# Patient Record
Sex: MALE | Race: ASIAN | NOT HISPANIC OR LATINO | Employment: OTHER | URBAN - METROPOLITAN AREA
[De-identification: names, ages, dates, MRNs, and addresses within clinical notes are randomized per-mention and may not be internally consistent; named-entity substitution may affect disease eponyms.]

---

## 2017-01-30 DIAGNOSIS — Z79.4 TYPE 2 DIABETES MELLITUS WITHOUT COMPLICATION, WITH LONG-TERM CURRENT USE OF INSULIN (HCC): ICD-10-CM

## 2017-01-30 DIAGNOSIS — I10 ESSENTIAL HYPERTENSION: ICD-10-CM

## 2017-01-30 DIAGNOSIS — E78.00 PURE HYPERCHOLESTEROLEMIA: ICD-10-CM

## 2017-01-30 DIAGNOSIS — E11.9 TYPE 2 DIABETES MELLITUS WITHOUT COMPLICATION, WITH LONG-TERM CURRENT USE OF INSULIN (HCC): ICD-10-CM

## 2017-01-30 RX ORDER — SIMVASTATIN 40 MG
40 TABLET ORAL EVERY EVENING
Qty: 30 TAB | Refills: 0 | Status: SHIPPED | OUTPATIENT
Start: 2017-01-30 | End: 2017-03-16 | Stop reason: SDUPTHER

## 2017-01-30 RX ORDER — LISINOPRIL 40 MG/1
40 TABLET ORAL DAILY
Qty: 30 TAB | Refills: 0 | Status: SHIPPED | OUTPATIENT
Start: 2017-01-30 | End: 2017-03-16 | Stop reason: SDUPTHER

## 2017-01-30 NOTE — TELEPHONE ENCOUNTER
----- Message from Tanmay Elizabeth sent at 1/27/2017  4:42 PM PST -----  Regarding: CHRISTOP[facility] PT- REFILL REQ  Contact: [phone]  Patient requesting refills on the following meds.....    1. One touch ultra blue test strips  2. Lisinipril  3. Lantus solostar inj  4. Simvastatin  Patient uses the [employer] on [address].

## 2017-01-30 NOTE — TELEPHONE ENCOUNTER
Last seen: 10/27/16 by Dr. Jason Torres appt: None     Was the patient seen in the last year in this department? Yes   Does patient have an active prescription for medications requested? No   Received Request Via: Pharmacy

## 2017-03-16 ENCOUNTER — OFFICE VISIT (OUTPATIENT)
Dept: INTERNAL MEDICINE | Facility: MEDICAL CENTER | Age: 62
End: 2017-03-16
Payer: COMMERCIAL

## 2017-03-16 VITALS
WEIGHT: 163.6 LBS | HEIGHT: 64 IN | BODY MASS INDEX: 27.93 KG/M2 | TEMPERATURE: 97.8 F | DIASTOLIC BLOOD PRESSURE: 82 MMHG | SYSTOLIC BLOOD PRESSURE: 140 MMHG | OXYGEN SATURATION: 95 % | HEART RATE: 96 BPM

## 2017-03-16 DIAGNOSIS — Z79.4 TYPE 2 DIABETES MELLITUS WITHOUT COMPLICATION, WITH LONG-TERM CURRENT USE OF INSULIN (HCC): ICD-10-CM

## 2017-03-16 DIAGNOSIS — E78.00 PURE HYPERCHOLESTEROLEMIA: ICD-10-CM

## 2017-03-16 DIAGNOSIS — K21.9 GASTROESOPHAGEAL REFLUX DISEASE, ESOPHAGITIS PRESENCE NOT SPECIFIED: ICD-10-CM

## 2017-03-16 DIAGNOSIS — I10 ESSENTIAL HYPERTENSION: ICD-10-CM

## 2017-03-16 DIAGNOSIS — E55.9 VITAMIN D DEFICIENCY: ICD-10-CM

## 2017-03-16 DIAGNOSIS — E11.9 TYPE 2 DIABETES MELLITUS WITHOUT COMPLICATION, WITH LONG-TERM CURRENT USE OF INSULIN (HCC): ICD-10-CM

## 2017-03-16 DIAGNOSIS — K21.9 GASTROESOPHAGEAL REFLUX DISEASE WITHOUT ESOPHAGITIS: ICD-10-CM

## 2017-03-16 DIAGNOSIS — B19.10 HEPATITIS B VIRUS INFECTION, UNSPECIFIED CHRONICITY: ICD-10-CM

## 2017-03-16 PROCEDURE — 99214 OFFICE O/P EST MOD 30 MIN: CPT | Mod: GC | Performed by: INTERNAL MEDICINE

## 2017-03-16 RX ORDER — ASPIRIN 81 MG/1
81 TABLET, CHEWABLE ORAL DAILY
Qty: 100 TAB | Refills: 3 | Status: SHIPPED | OUTPATIENT
Start: 2017-03-16 | End: 2022-04-02

## 2017-03-16 RX ORDER — OMEPRAZOLE 20 MG/1
20 CAPSULE, DELAYED RELEASE ORAL DAILY
Qty: 30 CAP | Refills: 3 | Status: SHIPPED | OUTPATIENT
Start: 2017-03-16 | End: 2022-04-02

## 2017-03-16 RX ORDER — NAPROXEN 250 MG/1
250 TABLET ORAL 2 TIMES DAILY WITH MEALS
Qty: 60 TAB | Refills: 3 | Status: SHIPPED | OUTPATIENT
Start: 2017-03-16 | End: 2022-02-17

## 2017-03-16 RX ORDER — LISINOPRIL 40 MG/1
40 TABLET ORAL DAILY
Qty: 30 TAB | Refills: 0 | Status: SHIPPED | OUTPATIENT
Start: 2017-03-16 | End: 2022-02-17

## 2017-03-16 RX ORDER — BLOOD-GLUCOSE METER
1 EACH MISCELLANEOUS 2 TIMES DAILY
Qty: 100 KIT | Refills: 0 | Status: SHIPPED | OUTPATIENT
Start: 2017-03-16 | End: 2017-06-29 | Stop reason: SDUPTHER

## 2017-03-16 RX ORDER — SIMVASTATIN 40 MG
40 TABLET ORAL EVERY EVENING
Qty: 30 TAB | Refills: 0 | Status: SHIPPED | OUTPATIENT
Start: 2017-03-16 | End: 2022-02-17 | Stop reason: SDUPTHER

## 2017-03-16 ASSESSMENT — PATIENT HEALTH QUESTIONNAIRE - PHQ9: CLINICAL INTERPRETATION OF PHQ2 SCORE: 1

## 2017-03-16 NOTE — ASSESSMENT & PLAN NOTE
"Filed Vitals:    03/16/17 1430   BP: 140/82   Pulse: 96   Temp: 36.6 °C (97.8 °F)   Height: 1.626 m (5' 4\")   Weight: 74.208 kg (163 lb 9.6 oz)   SpO2: 95%     BP better today though could continue to improve on lisinopril 40 mg which he can continue  Counsled on therapeutic lifestyle modifications including diet, exercise and weightloss  "

## 2017-03-16 NOTE — ASSESSMENT & PLAN NOTE
On lantus 30 u QHS and humalog 12u prior to lunch  Blood glucose log is somewhat better today can continue current regimen, will recheck HbA1c  Monofilament test with stable deficits  Patient has not seen ophthalmologist, will provide referral  ACE-I for hypertension as above  No flu vax this year, has declined despite counseling

## 2017-03-16 NOTE — PROGRESS NOTES
Established Patient    Pac presents today with the following:    CC: Follow up    HPI: Patient is a 61-year-old male with a past medical history significant for hepatitis B, GERD, vitamin D deficiency, hyperlipidemia, essential hypertension, diabetes type 2 who presents to the clinic for follow-up. He believe that patient has been compliant with his medications and otherwise has no complaints today. The patient has been eating well and has been somewhat active. The patient has been logging his blood sugars and feels they're under control. The patient otherwise denies any chest pain, shortness of breath, nausea, vomiting, dizziness, headache, loss of consciousness, fever, chills, dysuria, diarrhea, constipation, abdominal pain, cough, sputum or bleeding.    Patient Active Problem List    Diagnosis Date Noted   • Hepatitis B 06/23/2016   • GERD (gastroesophageal reflux disease) 06/23/2016   • Vitamin D deficiency 06/23/2016   • Hyperlipidemia 06/23/2016   • DM type 2 (diabetes mellitus, type 2) (CMS-[facility]) 06/23/2016   • Essential hypertension 06/23/2016     Current Outpatient Prescriptions   Medication Sig Dispense Refill   • vitamin D (CHOLECALCIFEROL) 1000 UNIT Tab Take 2 Tabs by mouth every day. 30 Tab 3   • lisinopril (PRINIVIL, ZESTRIL) 40 MG tablet Take 1 Tab by mouth every day. 30 Tab 0   • Insulin Pen Needle 31G X 8 MM Misc 1 Units by Does not apply route 2 Times a Day. 60 Each 6   • insulin lispro, Human, (HUMALOG) 100 UNIT/ML Solution Pen-injector injection Inject 15 Units as instructed every day before lunch. 3 PEN 9   • insulin glargine (LANTUS) 100 UNIT/ML Solution Pen-injector injection Inject 30 Units as instructed every bedtime. 1 PEN 0   • glucose blood strip 1 Strip by Other route 3 times a day. 90 Strip 0   • glucose blood strip 1 Strip by Other route as needed. 100 Strip 3   • Blood Glucose Monitoring Suppl (ONE TOUCH ULTRA 2) W/DEVICE Kit 1 Kit by Does not apply route 2 Times a Day. 100 Kit 0  "  • simvastatin (ZOCOR) 40 MG Tab Take 1 Tab by mouth every evening. 30 Tab 0   • naproxen (NAPROSYN) 250 MG Tab Take 1 Tab by mouth 2 times a day, with meals. 60 Tab 3   • omeprazole (PRILOSEC) 20 MG delayed-release capsule Take 1 Cap by mouth every day. 30 Cap 3   • metformin (GLUCOPHAGE) 1000 MG tablet Take 1 Tab by mouth 2 times a day, with meals. 60 Tab 3   • aspirin (ASA) 81 MG Chew Tab chewable tablet Take 1 Tab by mouth every day. 100 Tab 3     No current facility-administered medications for this visit.     ROS: As per HPI. Additional pertinent symptoms as noted below.    /82 mmHg  Pulse 96  Temp(Src) 36.6 °C (97.8 °F)  Ht 1.626 m (5' 4\")  Wt 74.208 kg (163 lb 9.6 oz)  BMI 28.07 kg/m2  SpO2 95%    Physical Exam   Constitutional:  oriented to person, place, and time. No distress.   Eyes: Pupils are equal, round, and reactive to light. No scleral icterus.  Neck: Neck supple. No thyromegaly present.   Cardiovascular: Normal rate, regular rhythm and normal heart sounds.  Exam reveals no gallop and no friction rub.  No murmur heard.  Pulmonary/Chest: Breath sounds normal. Chest wall is not dull to percussion.   Musculoskeletal:   no edema.   Lymphadenopathy: no cervical adenopathy  Neurological: alert and oriented to person, place, and time, distal bilateral sensory deficits on monofilament exam   Skin: No cyanosis. Nails show no clubbing. Bilateral feed with cracks and dry skin and some onychomycosis    Assessment and Plan    Problem List Items Addressed This Visit     Hepatitis B    Relevant Medications    naproxen (NAPROSYN) 250 MG Tab    GERD (gastroesophageal reflux disease)     Symptoms controlled with omeprazole 20 mg QD         Relevant Medications    omeprazole (PRILOSEC) 20 MG delayed-release capsule    Vitamin D deficiency     Maintained on 2000u/day will recheck levels and titrate as necessary         Relevant Medications    vitamin D (CHOLECALCIFEROL) 1000 UNIT Tab    Hyperlipidemia     " "Maintained on simvastatin 40 mg can continue, will recheck lipid panel         Relevant Medications    lisinopril (PRINIVIL, ZESTRIL) 40 MG tablet    simvastatin (ZOCOR) 40 MG Tab    DM type 2 (diabetes mellitus, type 2) (CMS-[facility])     On lantus 30 u QHS and humalog 12u prior to lunch  Blood glucose log is somewhat better today can continue current regimen, will recheck HbA1c  Monofilament test with stable deficits  Patient has not seen ophthalmologist, will provide referral  ACE-I for hypertension as above  No flu vax this year, has declined despite counseling         Relevant Medications    lisinopril (PRINIVIL, ZESTRIL) 40 MG tablet    Insulin Pen Needle 31G X 8 MM Misc    insulin lispro, Human, (HUMALOG) 100 UNIT/ML Solution Pen-injector injection    insulin glargine (LANTUS) 100 UNIT/ML Solution Pen-injector injection    glucose blood strip    glucose blood strip    Blood Glucose Monitoring Suppl (ONE TOUCH ULTRA 2) W/DEVICE Kit    metformin (GLUCOPHAGE) 1000 MG tablet    Other Relevant Orders    REFERRAL TO PODIATRY    REFERRAL TO OPHTHALMOLOGY    Essential hypertension     Filed Vitals:    03/16/17 1430   BP: 140/82   Pulse: 96   Temp: 36.6 °C (97.8 °F)   Height: 1.626 m (5' 4\")   Weight: 74.208 kg (163 lb 9.6 oz)   SpO2: 95%     BP better today though could continue to improve on lisinopril 40 mg which he can continue  Counsled on therapeutic lifestyle modifications including diet, exercise and weightloss         Relevant Medications    lisinopril (PRINIVIL, ZESTRIL) 40 MG tablet    simvastatin (ZOCOR) 40 MG Tab    metformin (GLUCOPHAGE) 1000 MG tablet    aspirin (ASA) 81 MG Chew Tab chewable tablet        Followup: Return in about 3 months (around 6/16/2017).    Signed by: Juan Luis Gomez M.D.    "

## 2017-03-16 NOTE — MR AVS SNAPSHOT
"        Pac Keith   3/16/2017 2:15 PM   Office Visit   MRN: [id number]    Department:  [facility] - Internal Med   Dept Phone:  [phone]    Description:  Male : 1955   Provider:  Juan Luis Gomez M.D.           Reason for Visit     Diabetes Foot exam due, c/o feet hurting.     Medication Refill ALL     Knee Pain Naprosyn doesn't help      Allergies as of 3/16/2017     No Known Allergies      You were diagnosed with     Vitamin D deficiency   [1581504]       Type 2 diabetes mellitus without complication, with long-term current use of insulin (CMS-[facility])   [9507943]       Essential hypertension   [5913246]       Pure hypercholesterolemia   [272.0.ICD-9-CM]       Gastroesophageal reflux disease, esophagitis presence not specified   [2352024]       Hepatitis B virus infection, unspecified chronicity   [2616434]       Gastroesophageal reflux disease without esophagitis   [732503]         Vital Signs     Blood Pressure Pulse Temperature Height Weight Body Mass Index    140/82 mmHg 96 36.6 °C (97.8 °F) 1.626 m (5' 4\") 74.208 kg (163 lb 9.6 oz) 28.07 kg/m2    Oxygen Saturation Smoking Status                95% Former Smoker          Basic Information     Date Of Birth Sex Race Ethnicity Preferred Language    1955 Male  Non- English      Your appointments     2017  2:00 PM   Established Patient with Juan Luis Gomez M.D.   [facility] [place] [facility] - Internal Medicine (--)    [address] [id number] [phone]           You will be receiving a confirmation call a few days before your appointment from our automated call confirmation system.              Problem List              ICD-10-CM Priority Class Noted - Resolved    Hepatitis B B19.10   2016 - Present    GERD (gastroesophageal reflux disease) K21.9   2016 - Present    Vitamin D deficiency E55.9   2016 - Present    Hyperlipidemia E78.5   2016 - Present    DM type 2 (diabetes mellitus, type 2) " (CMS-[facility]) E11.9   6/23/2016 - Present    Essential hypertension I10   6/23/2016 - Present      Health Maintenance        Date Due Completion Dates    RETINAL SCREENING 12/20/1971 ---    IMM DTaP/Tdap/Td Vaccine (1 - Tdap) 12/20/1972 ---    IMM PNEUMOCOCCAL 19-64 (ADULT) MEDIUM RISK SERIES (1 of 1 - PPSV23) 12/20/1972 ---    URINE ACR / MICROALBUMIN 12/29/2006 12/29/2005    IMM ZOSTER VACCINE 12/20/2013 ---    A1C SCREENING 4/13/2017 10/13/2016, 12/29/2005    FASTING LIPID PROFILE 10/13/2017 10/13/2016, 12/29/2005    SERUM CREATININE 10/13/2017 10/13/2016, 12/29/2005    DIABETES MONOFILAMENT / LE EXAM 3/16/2018 3/16/2017 (N/S)    Override on 3/16/2017: (N/S)    COLONOSCOPY 3/14/2027 3/14/2017 (N/S)    Override on 3/14/2017: (N/S) (PER G[facility] AND C NO COLONOSCOPY)            Current Immunizations     Influenza Vaccine Quad Inj (Preserved) 10/27/2016      Below and/or attached are the medications your provider expects you to take. Review all of your home medications and newly ordered medications with your provider and/or pharmacist. Follow medication instructions as directed by your provider and/or pharmacist. Please keep your medication list with you and share with your provider. Update the information when medications are discontinued, doses are changed, or new medications (including over-the-counter products) are added; and carry medication information at all times in the event of emergency situations     Allergies:  No Known Allergies          Medications  Valid as of: March 16, 2017 -  3:24 PM    Generic Name Brand Name Tablet Size Instructions for use    Aspirin (Chew Tab) ASA 81 MG Take 1 Tab by mouth every day.        Blood Glucose Monitoring Suppl (Kit) ONE TOUCH ULTRA 2 W/DEVICE 1 Kit by Does not apply route 2 Times a Day.        Cholecalciferol (Tab) cholecalciferol 1000 UNIT Take 2 Tabs by mouth every day.        Glucose Blood (Strip) glucose blood  1 Strip by Other route 3 times a day.        Glucose Blood  (Strip) glucose blood  1 Strip by Other route as needed.        Insulin Glargine (Solution Pen-injector) LANTUS 100 UNIT/ML Inject 30 Units as instructed every bedtime.        Insulin Lispro (Solution Pen-injector) HUMALOG 100 UNIT/ML Inject 15 Units as instructed every day before lunch.        Insulin Pen Needle (Misc) Insulin Pen Needle 31G X 8 MM 1 Units by Does not apply route 2 Times a Day.        Lisinopril (Tab) PRINIVIL, ZESTRIL 40 MG Take 1 Tab by mouth every day.        MetFORMIN HCl (Tab) GLUCOPHAGE 1000 MG Take 1 Tab by mouth 2 times a day, with meals.        Naproxen (Tab) NAPROSYN 250 MG Take 1 Tab by mouth 2 times a day, with meals.        Omeprazole (CAPSULE DELAYED RELEASE) PRILOSEC 20 MG Take 1 Cap by mouth every day.        Simvastatin (Tab) ZOCOR 40 MG Take 1 Tab by mouth every evening.        .                 Medicines prescribed today were sent to:     [facility] [address] [place] [address] [address] [place], [place] [id number] [address] [place] [id number]    Phone: [phone] Fax: [phone]    Open 24 Hours?: No      Medication refill instructions:       If your prescription bottle indicates you have medication refills left, it is not necessary to call your provider’s office. Please contact your pharmacy and they will refill your medication.    If your prescription bottle indicates you do not have any refills left, you may request refills at any time through one of the following ways: The online Incentive system (except Urgent Care), by calling your provider’s office, or by asking your pharmacy to contact your provider’s office with a refill request. Medication refills are processed only during regular business hours and may not be available until the next business day. Your provider may request additional information or to have a follow-up visit with you prior to refilling your medication.   *Please Note: Medication refills are assigned a new Rx number when refilled electronically. Your pharmacy may indicate that  no refills were authorized even though a new prescription for the same medication is available at the pharmacy. Please request the medicine by name with the pharmacy before contacting your provider for a refill.        Referral     A referral request has been sent to our patient care coordination department. Please allow 3-5 business days for us to process this request and contact you either by phone or mail. If you do not hear from us by the 5th business day, please call us at [phone].           MyChart Status: Patient Declined

## 2017-04-06 NOTE — TELEPHONE ENCOUNTER
Last seen: 03/16/17 by Dr. Gomez  Next appt: 06/29/17 with Dr. Gomez    Was the patient seen in the last year in this department? Yes   Does patient have an active prescription for medications requested? No   Received Request Via: Pharmacy

## 2017-04-07 RX ORDER — INSULIN GLARGINE 100 [IU]/ML
INJECTION, SOLUTION SUBCUTANEOUS
Qty: 15 PEN | Refills: 0 | Status: SHIPPED | OUTPATIENT
Start: 2017-04-07 | End: 2022-02-17 | Stop reason: SDUPTHER

## 2017-06-09 NOTE — TELEPHONE ENCOUNTER
Was the patient seen in the last year in this department? Yes Last seen 3/16/17 Dr Gomez next appt 6/29/17 Dr Gomez    Does patient have an active prescription for medications requested? No     Received Request Via: Pharmacy

## 2017-06-12 RX ORDER — SIMVASTATIN 40 MG
TABLET ORAL
Qty: 30 TAB | Refills: 0 | Status: SHIPPED | OUTPATIENT
Start: 2017-06-12 | End: 2022-02-17

## 2017-06-29 ENCOUNTER — OFFICE VISIT (OUTPATIENT)
Dept: INTERNAL MEDICINE | Facility: MEDICAL CENTER | Age: 62
End: 2017-06-29
Payer: COMMERCIAL

## 2017-06-29 VITALS
DIASTOLIC BLOOD PRESSURE: 90 MMHG | HEART RATE: 88 BPM | HEIGHT: 64 IN | OXYGEN SATURATION: 95 % | SYSTOLIC BLOOD PRESSURE: 150 MMHG | TEMPERATURE: 97.7 F | WEIGHT: 164.2 LBS | BODY MASS INDEX: 28.03 KG/M2

## 2017-06-29 DIAGNOSIS — E11.9 TYPE 2 DIABETES MELLITUS WITHOUT COMPLICATION, WITH LONG-TERM CURRENT USE OF INSULIN (HCC): ICD-10-CM

## 2017-06-29 DIAGNOSIS — I10 ESSENTIAL HYPERTENSION: ICD-10-CM

## 2017-06-29 DIAGNOSIS — Z79.4 TYPE 2 DIABETES MELLITUS WITHOUT COMPLICATION, WITH LONG-TERM CURRENT USE OF INSULIN (HCC): ICD-10-CM

## 2017-06-29 DIAGNOSIS — L72.9 CUTANEOUS CYST: ICD-10-CM

## 2017-06-29 DIAGNOSIS — E78.00 PURE HYPERCHOLESTEROLEMIA: ICD-10-CM

## 2017-06-29 LAB
HBA1C MFR BLD: 7.5 % (ref ?–5.8)
INT CON NEG: NEGATIVE
INT CON POS: POSITIVE

## 2017-06-29 PROCEDURE — 99213 OFFICE O/P EST LOW 20 MIN: CPT | Mod: GE | Performed by: INTERNAL MEDICINE

## 2017-06-29 PROCEDURE — 83036 HEMOGLOBIN GLYCOSYLATED A1C: CPT | Mod: GC | Performed by: INTERNAL MEDICINE

## 2017-06-29 RX ORDER — HYDROCHLOROTHIAZIDE 12.5 MG/1
12.5 TABLET ORAL DAILY
Qty: 30 TAB | Refills: 3 | Status: SHIPPED | OUTPATIENT
Start: 2017-06-29 | End: 2022-02-17

## 2017-06-29 RX ORDER — BLOOD-GLUCOSE METER
1 EACH MISCELLANEOUS 2 TIMES DAILY
Qty: 100 KIT | Refills: 0 | Status: SHIPPED | OUTPATIENT
Start: 2017-06-29 | End: 2022-04-02

## 2017-06-29 NOTE — ASSESSMENT & PLAN NOTE
"Filed Vitals:    06/29/17 1402   BP: 150/90   Pulse: 88   Temp: 36.5 °C (97.7 °F)   Height: 1.626 m (5' 4\")   Weight: 74.481 kg (164 lb 3.2 oz)   SpO2: 95%   Elevated today, on lisinopril 40 mg QD  Will add hydrochlorothiazide 12.5 mg QD to this  Continues to be hypertensive today will increase  "

## 2017-06-29 NOTE — PROGRESS NOTES
Established Patient    Pac presents today with the following:    CC: Follow-up    HPI: Patient is a 61-year-old male with a past medical history significant for hepatitis B, vitamin D deficiency, hyperlipidemia, hypertension, type 2 diabetes who presents to the clinic for follow-up. The patient has not had his lab work performed as instructed. The patient otherwise has no complaints today. Patient otherwise denies any chest pain, shortness of breath, nausea, vomiting, dizziness, abdominal pain, fever, chills, headache, dysuria, diarrhea, constipation or any bleeding.    Patient Active Problem List    Diagnosis Date Noted   • Cutaneous cyst 06/29/2017   • Hepatitis B 06/23/2016   • GERD (gastroesophageal reflux disease) 06/23/2016   • Vitamin D deficiency 06/23/2016   • Hyperlipidemia 06/23/2016   • DM type 2 (diabetes mellitus, type 2) (CMS-[facility]) 06/23/2016   • Essential hypertension 06/23/2016       Current Outpatient Prescriptions   Medication Sig Dispense Refill   • glucose blood strip 1 Strip by Other route 3 times a day. 90 Strip 3   • Insulin Pen Needle 31G X 8 MM Misc 1 Units by Does not apply route 2 Times a Day. 60 Each 6   • Blood Glucose Monitoring Suppl (ONE TOUCH ULTRA 2) W/DEVICE Kit 1 Kit by Does not apply route 2 Times a Day. 100 Kit 0   • hydrochlorothiazide (HYDRODIURIL) 12.5 MG tablet Take 1 Tab by mouth every day. 30 Tab 3   • LANTUS SOLOSTAR 100 UNIT/ML Solution Pen-injector injection INJECT 30 UNITS SUBCUTANEOUSLY EVERY NIGHT AT BEDTIME 15 PEN 0   • lisinopril (PRINIVIL, ZESTRIL) 40 MG tablet Take 1 Tab by mouth every day. 30 Tab 0   • insulin lispro, Human, (HUMALOG) 100 UNIT/ML Solution Pen-injector injection Inject 15 Units as instructed every day before lunch. 3 PEN 9   • glucose blood strip 1 Strip by Other route as needed. 100 Strip 3   • simvastatin (ZOCOR) 40 MG Tab Take 1 Tab by mouth every evening. 30 Tab 0   • naproxen (NAPROSYN) 250 MG Tab Take 1 Tab by mouth 2 times a day,  "with meals. 60 Tab 3   • omeprazole (PRILOSEC) 20 MG delayed-release capsule Take 1 Cap by mouth every day. 30 Cap 3   • metformin (GLUCOPHAGE) 1000 MG tablet Take 1 Tab by mouth 2 times a day, with meals. 60 Tab 3   • simvastatin (ZOCOR) 40 MG Tab TAKE ONE TABLET BY MOUTH IN THE EVENING 30 Tab 0   • vitamin D (CHOLECALCIFEROL) 1000 UNIT Tab Take 2 Tabs by mouth every day. 30 Tab 3   • insulin glargine (LANTUS) 100 UNIT/ML Solution Pen-injector injection Inject 30 Units as instructed every bedtime. 1 PEN 0   • aspirin (ASA) 81 MG Chew Tab chewable tablet Take 1 Tab by mouth every day. 100 Tab 3     No current facility-administered medications for this visit.       ROS: As per HPI. Additional pertinent symptoms as noted below.    /90 mmHg  Pulse 88  Temp(Src) 36.5 °C (97.7 °F)  Ht 1.626 m (5' 4\")  Wt 74.481 kg (164 lb 3.2 oz)  BMI 28.17 kg/m2  SpO2 95%    Physical Exam   Constitutional:  oriented to person, place, and time. No distress.   Eyes: Pupils are equal, round, and reactive to light. No scleral icterus.  Neck: Neck supple. No thyromegaly present.   Cardiovascular: Normal rate, regular rhythm and normal heart sounds.  Exam reveals no gallop and no friction rub.  No murmur heard.  Pulmonary/Chest: Breath sounds normal. Chest wall is not dull to percussion.   Musculoskeletal:   no edema.   Lymphadenopathy: no cervical adenopathy  Neurological: alert and oriented to person, place, and time.   Skin: No cyanosis. Nails show no clubbing. ~1in firm nodule on shoulder, nontender, no erythema or drainage    Assessment and Plan    Problem List Items Addressed This Visit     Hyperlipidemia     Maintained on simvastatin 40 mg can continue  Patient needs to have lipid panel performed         Relevant Medications    hydrochlorothiazide (HYDRODIURIL) 12.5 MG tablet    DM type 2 (diabetes mellitus, type 2) (CMS-[facility])     On lantus 30u QHS and humalog 15u before lunch  Has not had labwork performed, has not brought " "his glucose log today  Needs new glucometer strips  We'll recheck HbA1c today (Hba1c 7.5 today) good control, continue current regiment  Has not seen ophthalmology, will provide referral  Is on ACE inhibitor for hypertension  Continues to decline flu vaccination         Relevant Medications    glucose blood strip    Insulin Pen Needle 31G X 8 MM Misc    Blood Glucose Monitoring Suppl (ONE TOUCH ULTRA 2) W/DEVICE Kit    Other Relevant Orders    POCT A1C    MICROALBUMIN CREAT RATIO URINE (LAB COLLECT)    REFERRAL TO OPHTHALMOLOGY    Essential hypertension     Filed Vitals:    06/29/17 1402   BP: 150/90   Pulse: 88   Temp: 36.5 °C (97.7 °F)   Height: 1.626 m (5' 4\")   Weight: 74.481 kg (164 lb 3.2 oz)   SpO2: 95%   Elevated today, on lisinopril 40 mg QD  Will add hydrochlorothiazide 12.5 mg QD to this  Continues to be hypertensive today will increase         Relevant Medications    hydrochlorothiazide (HYDRODIURIL) 12.5 MG tablet    Cutaneous cyst     Likely benign, will have labwork performed with INR and plan for drainage on next visit         Relevant Orders    PROTHROMBIN TIME          Followup: Return in about 1 month (around 7/29/2017).      Signed by: Juan Luis Gomez M.D.    "

## 2017-06-29 NOTE — MR AVS SNAPSHOT
"        Pac Keith   2017 2:00 PM   Office Visit   MRN: [id number]    Department:  [facility]   Dept Phone:  [phone]    Description:  Male : 1955   Provider:  Juan Luis Gomez M.D.           Reason for Visit     Diabetes Needs new glucometer, test strips, lancets.     Mass Right shoulder       Allergies as of 2017     No Known Allergies      You were diagnosed with     Type 2 diabetes mellitus without complication, with long-term current use of insulin (CMS-HCC)   [9388596]       Essential hypertension   [1049981]       Pure hypercholesterolemia   [272.0.ICD-9-CM]       Cutaneous cyst   [592370]         Vital Signs     Blood Pressure Pulse Temperature Height Weight Body Mass Index    150/90 mmHg 88 36.5 °C (97.7 °F) 1.626 m (5' 4\") 74.481 kg (164 lb 3.2 oz) 28.17 kg/m2    Oxygen Saturation Smoking Status                95% Former Smoker          Basic Information     Date Of Birth Sex Race Ethnicity Preferred Language    1955 Male  Non- English      Problem List              ICD-10-CM Priority Class Noted - Resolved    Hepatitis B B19.10   2016 - Present    GERD (gastroesophageal reflux disease) K21.9   2016 - Present    Vitamin D deficiency E55.9   2016 - Present    Hyperlipidemia E78.5   2016 - Present    DM type 2 (diabetes mellitus, type 2) (CMS-HCC) E11.9   2016 - Present    Essential hypertension I10   2016 - Present    Cutaneous cyst L72.9   2017 - Present      Health Maintenance        Date Due Completion Dates    RETINAL SCREENING 1971 ---    IMM DTaP/Tdap/Td Vaccine (1 - Tdap) 1972 ---    IMM PNEUMOCOCCAL 19-64 (ADULT) MEDIUM RISK SERIES (1 of 1 - PPSV23) 1972 ---    URINE ACR / MICROALBUMIN 2006    IMM ZOSTER VACCINE 2013 ---    A1C SCREENING 2017 10/13/2016, 2005    FASTING LIPID PROFILE 10/13/2017 10/13/2016, 2005    SERUM CREATININE 10/13/2017 10/13/2016, 2005 "    DIABETES MONOFILAMENT / LE EXAM 3/16/2018 3/16/2017 (N/S)    Override on 3/16/2017: (N/S)    COLONOSCOPY 3/14/2027 3/14/2017 (N/S)    Override on 3/14/2017: (N/S) (PER [facility] AND C NO COLONOSCOPY)            Current Immunizations     Influenza Vaccine Quad Inj (Preserved) 10/27/2016      Below and/or attached are the medications your provider expects you to take. Review all of your home medications and newly ordered medications with your provider and/or pharmacist. Follow medication instructions as directed by your provider and/or pharmacist. Please keep your medication list with you and share with your provider. Update the information when medications are discontinued, doses are changed, or new medications (including over-the-counter products) are added; and carry medication information at all times in the event of emergency situations     Allergies:  No Known Allergies          Medications  Valid as of: June 29, 2017 -  2:49 PM    Generic Name Brand Name Tablet Size Instructions for use    Aspirin (Chew Tab) ASA 81 MG Take 1 Tab by mouth every day.        Blood Glucose Monitoring Suppl (Kit) ONE TOUCH ULTRA 2 W/DEVICE 1 Kit by Does not apply route 2 Times a Day.        Cholecalciferol (Tab) cholecalciferol 1000 UNIT Take 2 Tabs by mouth every day.        Glucose Blood (Strip) glucose blood  1 Strip by Other route as needed.        Glucose Blood (Strip) glucose blood  1 Strip by Other route 3 times a day.        HydroCHLOROthiazide (Tab) HYDRODIURIL 12.5 MG Take 1 Tab by mouth every day.        Insulin Glargine (Solution Pen-injector) LANTUS 100 UNIT/ML Inject 30 Units as instructed every bedtime.        Insulin Glargine (Solution Pen-injector) LANTUS SOLOSTAR 100 UNIT/ML INJECT 30 UNITS SUBCUTANEOUSLY EVERY NIGHT AT BEDTIME        Insulin Lispro (Solution Pen-injector) HUMALOG 100 UNIT/ML Inject 15 Units as instructed every day before lunch.        Insulin Pen Needle (Misc) Insulin Pen Needle 31G X 8 MM 1 Units  by Does not apply route 2 Times a Day.        Lisinopril (Tab) PRINIVIL, ZESTRIL 40 MG Take 1 Tab by mouth every day.        MetFORMIN HCl (Tab) GLUCOPHAGE 1000 MG Take 1 Tab by mouth 2 times a day, with meals.        Naproxen (Tab) NAPROSYN 250 MG Take 1 Tab by mouth 2 times a day, with meals.        Omeprazole (CAPSULE DELAYED RELEASE) PRILOSEC 20 MG Take 1 Cap by mouth every day.        Simvastatin (Tab) ZOCOR 40 MG Take 1 Tab by mouth every evening.        Simvastatin (Tab) ZOCOR 40 MG TAKE ONE TABLET BY MOUTH IN THE EVENING        .                 Medicines prescribed today were sent to:     [facility] [address] [place] [address], [place] - [address]    [address] [place] [address]    Phone: [phone] Fax: [phone]    Open 24 Hours?: No      Medication refill instructions:       If your prescription bottle indicates you have medication refills left, it is not necessary to call your provider’s office. Please contact your pharmacy and they will refill your medication.    If your prescription bottle indicates you do not have any refills left, you may request refills at any time through one of the following ways: The online [employer] system (except Urgent Care), by calling your provider’s office, or by asking your pharmacy to contact your provider’s office with a refill request. Medication refills are processed only during regular business hours and may not be available until the next business day. Your provider may request additional information or to have a follow-up visit with you prior to refilling your medication.   *Please Note: Medication refills are assigned a new Rx number when refilled electronically. Your pharmacy may indicate that no refills were authorized even though a new prescription for the same medication is available at the pharmacy. Please request the medicine by name with the pharmacy before contacting your provider for a refill.        Your To Do List     Future Labs/Procedures Complete By Expires      MICROALBUMIN CREAT RATIO URINE (LAB COLLECT)  As directed 6/29/2018    PROTHROMBIN TIME  As directed 6/29/2018      Referral     A referral request has been sent to our patient care coordination department. Please allow 3-5 business days for us to process this request and contact you either by phone or mail. If you do not hear from us by the 5th business day, please call us at [phone].        Instructions    Have labwork performed  Return to clinic in 1 month          [employer] Access Code: [email or web address]  Expires: 7/29/2017  2:49 PM    [employer]  A secure, online tool to manage your health information     [employer]’s [employer]® is a secure, online tool that connects you to your personalized health information from the privacy of your home -- day or night - making it very easy for you to manage your healthcare. Once the activation process is completed, you can even access your medical information using the [employer] ellyn, which is available for free in the [employer] Ellyn store or Google Play store.     [employer] provides the following levels of access (as shown below):   My Chart Features   [facility]  Specialists [facility]  Urgent  Care [facility]  Primary Care  Doctor   Email your healthcare team securely and privately 24/7 X X X    Manage appointments: schedule your next appointment; view details of past/upcoming appointments X      Request prescription refills. X      View recent personal medical records, including lab and immunizations X X X X   View health record, including health history, allergies, medications X X X X   Read reports about your outpatient visits, procedures, consult and ER notes X X X X   See your discharge summary, which is a recap of your hospital and/or ER visit that includes your diagnosis, lab results, and care plan. X X       How to register for [employer]:  1. Go to  [email or web address].  2. Click on the Sign Up Now box, which takes you to the New Member Sign  Up page. You will need to provide the following information:  a. Enter your [employer] Access Code exactly as it appears at the top of this page. (You will not need to use this code after you’ve completed the sign-up process. If you do not sign up before the expiration date, you must request a new code.)   b. Enter your date of birth.   c. Enter your home email address.   d. Click Submit, and follow the next screen’s instructions.  3. Create a [employer] ID. This will be your [employer] login ID and cannot be changed, so think of one that is secure and easy to remember.  4. Create a [employer] password. You can change your password at any time.  5. Enter your Password Reset Question and Answer. This can be used at a later time if you forget your password.   6. Enter your e-mail address. This allows you to receive e-mail notifications when new information is available in [employer].  7. Click Sign Up. You can now view your health information.    For assistance activating your [employer] account, call [phone]

## 2017-06-29 NOTE — ASSESSMENT & PLAN NOTE
On lantus 30u QHS and humalog 15u before lunch  Has not had labwork performed, has not brought his glucose log today  Needs new glucometer strips  We'll recheck HbA1c today (Hba1c 7.5 today) good control, continue current regiment  Has not seen ophthalmology, will provide referral  Is on ACE inhibitor for hypertension  Continues to decline flu vaccination

## 2017-07-17 ENCOUNTER — HOSPITAL ENCOUNTER (OUTPATIENT)
Dept: LAB | Facility: MEDICAL CENTER | Age: 62
End: 2017-07-17
Attending: HOSPITALIST
Payer: COMMERCIAL

## 2017-07-17 DIAGNOSIS — E55.9 VITAMIN D DEFICIENCY: ICD-10-CM

## 2017-07-17 DIAGNOSIS — I10 ESSENTIAL HYPERTENSION: ICD-10-CM

## 2017-07-17 DIAGNOSIS — Z79.4 TYPE 2 DIABETES MELLITUS WITHOUT COMPLICATION, WITH LONG-TERM CURRENT USE OF INSULIN (HCC): ICD-10-CM

## 2017-07-17 DIAGNOSIS — E11.9 TYPE 2 DIABETES MELLITUS WITHOUT COMPLICATION, WITH LONG-TERM CURRENT USE OF INSULIN (HCC): ICD-10-CM

## 2017-07-17 DIAGNOSIS — B19.10 HEPATITIS B VIRUS INFECTION, UNSPECIFIED CHRONICITY: ICD-10-CM

## 2017-07-17 DIAGNOSIS — L72.9 CUTANEOUS CYST: ICD-10-CM

## 2017-07-17 LAB
25(OH)D3 SERPL-MCNC: 16 NG/ML (ref 30–100)
ALBUMIN SERPL BCP-MCNC: 4.4 G/DL (ref 3.2–4.9)
ALBUMIN/GLOB SERPL: 1.5 G/DL
ALP SERPL-CCNC: 51 U/L (ref 30–99)
ALT SERPL-CCNC: 16 U/L (ref 2–50)
ANION GAP SERPL CALC-SCNC: 10 MMOL/L (ref 0–11.9)
AST SERPL-CCNC: 19 U/L (ref 12–45)
BASOPHILS # BLD AUTO: 0.8 % (ref 0–1.8)
BASOPHILS # BLD: 0.08 K/UL (ref 0–0.12)
BILIRUB SERPL-MCNC: 0.5 MG/DL (ref 0.1–1.5)
BUN SERPL-MCNC: 19 MG/DL (ref 8–22)
CALCIUM SERPL-MCNC: 9.9 MG/DL (ref 8.5–10.5)
CHLORIDE SERPL-SCNC: 104 MMOL/L (ref 96–112)
CHOLEST SERPL-MCNC: 162 MG/DL (ref 100–199)
CO2 SERPL-SCNC: 26 MMOL/L (ref 20–33)
CREAT SERPL-MCNC: 0.83 MG/DL (ref 0.5–1.4)
CREAT UR-MCNC: 43.3 MG/DL
EOSINOPHIL # BLD AUTO: 0.88 K/UL (ref 0–0.51)
EOSINOPHIL NFR BLD: 8.3 % (ref 0–6.9)
ERYTHROCYTE [DISTWIDTH] IN BLOOD BY AUTOMATED COUNT: 44.1 FL (ref 35.9–50)
EST. AVERAGE GLUCOSE BLD GHB EST-MCNC: 171 MG/DL
GFR SERPL CREATININE-BSD FRML MDRD: >60 ML/MIN/1.73 M 2
GLOBULIN SER CALC-MCNC: 3 G/DL (ref 1.9–3.5)
GLUCOSE SERPL-MCNC: 50 MG/DL (ref 65–99)
HBA1C MFR BLD: 7.6 % (ref 0–5.6)
HCT VFR BLD AUTO: 43 % (ref 42–52)
HDLC SERPL-MCNC: 52 MG/DL
HGB BLD-MCNC: 14.1 G/DL (ref 14–18)
IMM GRANULOCYTES # BLD AUTO: 0.02 K/UL (ref 0–0.11)
IMM GRANULOCYTES NFR BLD AUTO: 0.2 % (ref 0–0.9)
INR PPP: 1.11 (ref 0.87–1.13)
LDLC SERPL CALC-MCNC: 97 MG/DL
LYMPHOCYTES # BLD AUTO: 3.43 K/UL (ref 1–4.8)
LYMPHOCYTES NFR BLD: 32.5 % (ref 22–41)
MCH RBC QN AUTO: 29.2 PG (ref 27–33)
MCHC RBC AUTO-ENTMCNC: 32.8 G/DL (ref 33.7–35.3)
MCV RBC AUTO: 89 FL (ref 81.4–97.8)
MICROALBUMIN UR-MCNC: 2.3 MG/DL
MICROALBUMIN/CREAT UR: 53 MG/G (ref 0–30)
MONOCYTES # BLD AUTO: 0.84 K/UL (ref 0–0.85)
MONOCYTES NFR BLD AUTO: 8 % (ref 0–13.4)
NEUTROPHILS # BLD AUTO: 5.29 K/UL (ref 1.82–7.42)
NEUTROPHILS NFR BLD: 50.2 % (ref 44–72)
NRBC # BLD AUTO: 0 K/UL
NRBC BLD AUTO-RTO: 0 /100 WBC
PLATELET # BLD AUTO: 266 K/UL (ref 164–446)
PMV BLD AUTO: 9.9 FL (ref 9–12.9)
POTASSIUM SERPL-SCNC: 4 MMOL/L (ref 3.6–5.5)
PROT SERPL-MCNC: 7.4 G/DL (ref 6–8.2)
PROTHROMBIN TIME: 14.7 SEC (ref 12–14.6)
RBC # BLD AUTO: 4.83 M/UL (ref 4.7–6.1)
SODIUM SERPL-SCNC: 140 MMOL/L (ref 135–145)
TRIGL SERPL-MCNC: 66 MG/DL (ref 0–149)
WBC # BLD AUTO: 10.5 K/UL (ref 4.8–10.8)

## 2017-07-17 PROCEDURE — 82043 UR ALBUMIN QUANTITATIVE: CPT

## 2017-07-17 PROCEDURE — 83036 HEMOGLOBIN GLYCOSYLATED A1C: CPT

## 2017-07-17 PROCEDURE — 85025 COMPLETE CBC W/AUTO DIFF WBC: CPT

## 2017-07-17 PROCEDURE — 85610 PROTHROMBIN TIME: CPT

## 2017-07-17 PROCEDURE — 80053 COMPREHEN METABOLIC PANEL: CPT

## 2017-07-17 PROCEDURE — 82306 VITAMIN D 25 HYDROXY: CPT

## 2017-07-17 PROCEDURE — 80061 LIPID PANEL: CPT

## 2017-07-17 PROCEDURE — 82570 ASSAY OF URINE CREATININE: CPT

## 2017-07-17 PROCEDURE — 36415 COLL VENOUS BLD VENIPUNCTURE: CPT

## 2017-07-25 ENCOUNTER — TELEPHONE (OUTPATIENT)
Dept: INTERNAL MEDICINE | Facility: MEDICAL CENTER | Age: 62
End: 2017-07-25

## 2017-07-25 DIAGNOSIS — E11.9 TYPE 2 DIABETES MELLITUS WITHOUT COMPLICATION, WITH LONG-TERM CURRENT USE OF INSULIN (HCC): ICD-10-CM

## 2017-07-25 DIAGNOSIS — Z79.4 TYPE 2 DIABETES MELLITUS WITHOUT COMPLICATION, WITH LONG-TERM CURRENT USE OF INSULIN (HCC): ICD-10-CM

## 2017-07-25 NOTE — TELEPHONE ENCOUNTER
Pt is not established with a new PCP yet as we are waiting to see if any of the new Dr's speak Bulgarian. Please have a test strip rx print on plain paper for me to send to a diabetes supply [facility], test tid for DM E11.9.

## 2021-03-03 DIAGNOSIS — Z23 NEED FOR VACCINATION: ICD-10-CM

## 2021-12-13 ENCOUNTER — TELEPHONE (OUTPATIENT)
Dept: SCHEDULING | Facility: IMAGING CENTER | Age: 66
End: 2021-12-13

## 2022-01-24 ENCOUNTER — TELEPHONE (OUTPATIENT)
Dept: SCHEDULING | Facility: IMAGING CENTER | Age: 67
End: 2022-01-24

## 2022-02-17 ENCOUNTER — HOSPITAL ENCOUNTER (OUTPATIENT)
Facility: MEDICAL CENTER | Age: 67
End: 2022-02-17
Attending: INTERNAL MEDICINE
Payer: MEDICARE

## 2022-02-17 ENCOUNTER — OFFICE VISIT (OUTPATIENT)
Dept: MEDICAL GROUP | Facility: PHYSICIAN GROUP | Age: 67
End: 2022-02-17
Payer: MEDICARE

## 2022-02-17 VITALS
TEMPERATURE: 98.1 F | DIASTOLIC BLOOD PRESSURE: 60 MMHG | BODY MASS INDEX: 29.06 KG/M2 | RESPIRATION RATE: 18 BRPM | SYSTOLIC BLOOD PRESSURE: 110 MMHG | HEIGHT: 63 IN | OXYGEN SATURATION: 95 % | HEART RATE: 97 BPM | WEIGHT: 164 LBS

## 2022-02-17 DIAGNOSIS — E11.42 DIABETIC POLYNEUROPATHY ASSOCIATED WITH TYPE 2 DIABETES MELLITUS (HCC): ICD-10-CM

## 2022-02-17 DIAGNOSIS — Z12.5 SCREENING FOR MALIGNANT NEOPLASM OF PROSTATE: ICD-10-CM

## 2022-02-17 DIAGNOSIS — B19.10 HEPATITIS B INFECTION WITHOUT DELTA AGENT WITHOUT HEPATIC COMA, UNSPECIFIED CHRONICITY: ICD-10-CM

## 2022-02-17 DIAGNOSIS — E78.5 DYSLIPIDEMIA: ICD-10-CM

## 2022-02-17 DIAGNOSIS — E55.9 VITAMIN D DEFICIENCY: ICD-10-CM

## 2022-02-17 DIAGNOSIS — R05.9 COUGH: ICD-10-CM

## 2022-02-17 DIAGNOSIS — I10 ESSENTIAL HYPERTENSION: ICD-10-CM

## 2022-02-17 LAB
EXTERNAL QUALITY CONTROL: NORMAL
HBA1C MFR BLD: 8.2 % (ref 0–5.6)
INT CON NEG: ABNORMAL
INT CON POS: ABNORMAL
SARS-COV+SARS-COV-2 AG RESP QL IA.RAPID: NEGATIVE

## 2022-02-17 PROCEDURE — 87426 SARSCOV CORONAVIRUS AG IA: CPT | Performed by: INTERNAL MEDICINE

## 2022-02-17 PROCEDURE — U0005 INFEC AGEN DETEC AMPLI PROBE: HCPCS

## 2022-02-17 PROCEDURE — U0003 INFECTIOUS AGENT DETECTION BY NUCLEIC ACID (DNA OR RNA); SEVERE ACUTE RESPIRATORY SYNDROME CORONAVIRUS 2 (SARS-COV-2) (CORONAVIRUS DISEASE [COVID-19]), AMPLIFIED PROBE TECHNIQUE, MAKING USE OF HIGH THROUGHPUT TECHNOLOGIES AS DESCRIBED BY CMS-2020-01-R: HCPCS

## 2022-02-17 PROCEDURE — 83036 HEMOGLOBIN GLYCOSYLATED A1C: CPT | Performed by: INTERNAL MEDICINE

## 2022-02-17 PROCEDURE — 99204 OFFICE O/P NEW MOD 45 MIN: CPT | Mod: CS | Performed by: INTERNAL MEDICINE

## 2022-02-17 RX ORDER — INSULIN GLARGINE 100 [IU]/ML
INJECTION, SOLUTION SUBCUTANEOUS
Qty: 5 EACH | Refills: 6 | Status: SHIPPED | OUTPATIENT
Start: 2022-02-17 | End: 2022-04-14

## 2022-02-17 RX ORDER — MELOXICAM 15 MG/1
15 TABLET ORAL
Qty: 30 TABLET | Refills: 3 | Status: SHIPPED | OUTPATIENT
Start: 2022-02-17 | End: 2022-04-14

## 2022-02-17 RX ORDER — BENZONATATE 100 MG/1
100 CAPSULE ORAL 3 TIMES DAILY PRN
Qty: 60 CAPSULE | Refills: 0 | Status: SHIPPED | OUTPATIENT
Start: 2022-02-17 | End: 2022-04-14

## 2022-02-17 RX ORDER — AMOXICILLIN AND CLAVULANATE POTASSIUM 875; 125 MG/1; MG/1
1 TABLET, FILM COATED ORAL 2 TIMES DAILY
Qty: 14 TABLET | Refills: 0 | Status: SHIPPED | OUTPATIENT
Start: 2022-02-17 | End: 2022-04-02

## 2022-02-17 RX ORDER — LOSARTAN POTASSIUM 25 MG/1
25 TABLET ORAL DAILY
Qty: 30 TABLET | Refills: 6 | Status: SHIPPED | OUTPATIENT
Start: 2022-02-17 | End: 2022-04-14

## 2022-02-17 RX ORDER — LORATADINE 10 MG/1
10 TABLET ORAL DAILY
COMMUNITY
End: 2022-04-02

## 2022-02-17 RX ORDER — LOSARTAN POTASSIUM 50 MG/1
50 TABLET ORAL DAILY
Qty: 90 TABLET | Refills: 3 | Status: SHIPPED | OUTPATIENT
Start: 2022-02-17 | End: 2022-02-17

## 2022-02-17 RX ORDER — SIMVASTATIN 40 MG
40 TABLET ORAL EVERY EVENING
Qty: 90 TABLET | Refills: 3 | Status: ON HOLD | OUTPATIENT
Start: 2022-02-17 | End: 2022-04-08

## 2022-02-17 RX ORDER — MELOXICAM 15 MG/1
15 TABLET ORAL DAILY
COMMUNITY
End: 2022-02-17 | Stop reason: SDUPTHER

## 2022-02-17 ASSESSMENT — PATIENT HEALTH QUESTIONNAIRE - PHQ9: CLINICAL INTERPRETATION OF PHQ2 SCORE: 0

## 2022-02-18 ENCOUNTER — TELEPHONE (OUTPATIENT)
Dept: MEDICAL GROUP | Facility: PHYSICIAN GROUP | Age: 67
End: 2022-02-18
Payer: MEDICARE

## 2022-02-18 DIAGNOSIS — R05.9 COUGH: ICD-10-CM

## 2022-02-18 LAB
COVID ORDER STATUS COVID19: NORMAL
SARS-COV-2 RNA RESP QL NAA+PROBE: NOTDETECTED
SPECIMEN SOURCE: NORMAL

## 2022-02-18 NOTE — ASSESSMENT & PLAN NOTE
This is a new condition noted since last several days.  The patient denies fever or chills.  The cough has become more productive in nature.  Patient denies known Covid exposure.  he denies shortness of breath or wheezing

## 2022-02-18 NOTE — ASSESSMENT & PLAN NOTE
This is a chronic condition.  The patient reported that he has run out of his Metformin and insulin for several months.  The A1c completed in the office today is high at 8.2%.

## 2022-02-18 NOTE — ASSESSMENT & PLAN NOTE
This diagnosis was made by his previous PCP.  However patient unaware of this condition.  I recommend to repeat blood test for follow-up..

## 2022-02-18 NOTE — TELEPHONE ENCOUNTER
----- Message from Mitesh Sorto M.D. sent at 2/17/2022  5:51 PM PST -----  Please notify patient about their result(s):    Covid rapid antigen test: negative  Covid pcr test is pending. Results may take up to 3 business days.

## 2022-02-18 NOTE — TELEPHONE ENCOUNTER
----- Message from Mitesh Sorto M.D. sent at 2/18/2022  2:12 PM PST -----  Pls notify pt    Covid pcr test: negative.

## 2022-02-18 NOTE — PROGRESS NOTES
PRIMARY CARE CLINIC VISIT  Chief Complaint   Patient presents with   • Establish Care   Going over medical conditions      History of Present Illness     Diabetic polyneuropathy associated with type 2 diabetes mellitus (HCC)  This is a chronic condition.  The patient reported that he has run out of his Metformin and insulin for several months.  The A1c completed in the office today is high at 8.2%.    Hepatitis B  This diagnosis was made by his previous PCP.  However patient unaware of this condition.  I recommend to repeat blood test for follow-up..    Essential hypertension  This is a chronic condition.  Patient reported that he has not taking his blood pressure medication for several months.  Dyslipidemia  Patient was previously on simvastatin.  Patient requests refills.    Cough  This is a new condition noted since last several days.  The patient denies fever or chills.  The cough has become more productive in nature.  Patient denies known Covid exposure.  he denies shortness of breath or wheezing      Current Outpatient Medications on File Prior to Visit   Medication Sig Dispense Refill   • loratadine (CLARITIN) 10 MG Tab Take 10 mg by mouth every day.     • LANTUS SOLOSTAR 100 UNIT/ML Solution Pen-injector injection INJECT 30 UNITS SUBCUTANEOUSLY AT BEDTIME AS DIRECTED 15 PEN 2   • glucose blood strip 1 Strip by Other route 3 times a day. 100 Strip 1   • Insulin Pen Needle 31G X 8 MM Misc 1 Units by Does not apply route 2 Times a Day. 60 Each 6   • Blood Glucose Monitoring Suppl (ONE TOUCH ULTRA 2) W/DEVICE Kit 1 Kit by Does not apply route 2 Times a Day. 100 Kit 0   • vitamin D (CHOLECALCIFEROL) 1000 UNIT Tab Take 2 Tabs by mouth every day. 30 Tab 3   • insulin lispro, Human, (HUMALOG) 100 UNIT/ML Solution Pen-injector injection Inject 15 Units as instructed every day before lunch. 3 PEN 9   • glucose blood strip 1 Strip by Other route as needed. 100 Strip 3   • aspirin (ASA) 81 MG Chew Tab chewable tablet  "Take 1 Tab by mouth every day. 100 Tab 3   • omeprazole (PRILOSEC) 20 MG delayed-release capsule Take 1 Cap by mouth every day. (Patient not taking: Reported on 2/17/2022) 30 Cap 3     No current facility-administered medications on file prior to visit.        Allergies: Patient has no known allergies.    ROS  As per HPI above. All other systems reviewed and negative.      Past Medical, Social, and Family history reviewed and updated in EPIC     Objective     /60   Pulse 97   Temp 36.7 °C (98.1 °F) (Temporal)   Resp 18   Ht 1.6 m (5' 3\")   Wt 74.4 kg (164 lb)   SpO2 95%    Body mass index is 29.05 kg/m².    General: alert and oriented  Cardiovascular: regular rate and rhythm  Pulmonary: lungs : no wheezing   Gastrointestinal: BS present. No obvious mass noted  Monofilament testing with a 10 gram force: sensation intact: decreased bilaterally  Visual Inspection: Feet with dry scaly skin small cracks noted.  No active bleeding.  Pedal pulses: decreased bilaterally    Nose with minimal purulent drainage  Oropharynx with erythema no exudates    Assessment and Plan     1. Diabetic polyneuropathy associated with type 2 diabetes mellitus (HCC)  Uncontrolled.  Advised the patient to resume the Lantus 30 units at bedtime.  Resume Metformin 500 mg p.o. twice daily.    A1c goal of 7% discussed.  Pt's education:   -Advised the  benefits of blood glucose monitoring, potential hypoglycemia , medication mode of action/ possible side effects, the effects of exercise, potential acute and chronic conditions related to diabetes.   -Discussed with pt regarding changing diet and making better choices to help  blood sugar.  -Also encouraged pt to continue with regular exercises/walking.    - Referral to Ophthalmology  - Basic Metabolic Panel; Future  - TSH; Future    2. Hepatitis B infection without delta agent without hepatic coma, unspecified chronicity  Patient was unaware of this diagnosis.  Lab tests ordered for " follow-up.  - HEPATIC FUNCTION PANEL; Future  - HEP B SURFACE AB; Future  - HEP B SURFACE ANTIGEN; Future  - HEP C VIRUS ANTIBODY; Future    3. Screening for malignant neoplasm of prostate    - PROSTATE SPECIFIC AG SCREENING; Future    4. Dyslipidemia  Resume simvastatin.  Lipid panel ordered.  - Lipid Profile; Future    5. Essential hypertension  Advised the patient to resume losartan 25 mg p.o. daily.  Advised the patient to monitor his blood pressure at home regularly.  - CBC WITHOUT DIFFERENTIAL; Future    6. Vitamin D deficiency    - VITAMIN D,25 HYDROXY; Future    7. Cough  Examination today consistent with acute sinusitis/bronchitis.  Rule out possible Covid infection    - DX-CHEST-2 VIEWS; Future  - SARS-CoV-2, PCR (In-House): Collect NP OR nasal swab in VTM; Future  - POCT SARS-COV Antigen ASIYA Manual Result    Prescribed Augmentin 875 mg p.o. twice daily for 7 days.  Tessalon 3 times daily PrN for cough.  Potential side effects of the medication discussed with patient.  Recommend office follow-up in 7 to 10 days.          Return if symptoms worsen or fail to improve               Please note that this dictation was created using voice recognition software. I have made every reasonable attempt to correct obvious errors but there may be errors of grammar and content that I may have overlooked prior to finalization of this note.      Mitesh Sorto MD  Internal Medicine  [facility]

## 2022-02-18 NOTE — ASSESSMENT & PLAN NOTE
This is a chronic condition.  Patient reported that he was on lisinopril however he ran out of this medication also for several months.

## 2022-03-20 ENCOUNTER — HOSPITAL ENCOUNTER (EMERGENCY)
Facility: MEDICAL CENTER | Age: 67
End: 2022-03-20
Attending: EMERGENCY MEDICINE
Payer: MEDICARE

## 2022-03-20 VITALS
DIASTOLIC BLOOD PRESSURE: 79 MMHG | TEMPERATURE: 97.4 F | BODY MASS INDEX: 29.24 KG/M2 | HEIGHT: 64 IN | RESPIRATION RATE: 19 BRPM | OXYGEN SATURATION: 97 % | SYSTOLIC BLOOD PRESSURE: 127 MMHG | HEART RATE: 94 BPM | WEIGHT: 171.3 LBS

## 2022-03-20 DIAGNOSIS — E16.2 HYPOGLYCEMIA: ICD-10-CM

## 2022-03-20 LAB
ALBUMIN SERPL BCP-MCNC: 3.9 G/DL (ref 3.2–4.9)
ALBUMIN/GLOB SERPL: 1.3 G/DL
ALP SERPL-CCNC: 77 U/L (ref 30–99)
ALT SERPL-CCNC: 40 U/L (ref 2–50)
ANION GAP SERPL CALC-SCNC: 14 MMOL/L (ref 7–16)
AST SERPL-CCNC: 56 U/L (ref 12–45)
BASOPHILS # BLD AUTO: 0.4 % (ref 0–1.8)
BASOPHILS # BLD: 0.03 K/UL (ref 0–0.12)
BILIRUB SERPL-MCNC: 0.7 MG/DL (ref 0.1–1.5)
BUN SERPL-MCNC: 25 MG/DL (ref 8–22)
CALCIUM SERPL-MCNC: 8.8 MG/DL (ref 8.5–10.5)
CHLORIDE SERPL-SCNC: 101 MMOL/L (ref 96–112)
CO2 SERPL-SCNC: 19 MMOL/L (ref 20–33)
CREAT SERPL-MCNC: 1.44 MG/DL (ref 0.5–1.4)
EOSINOPHIL # BLD AUTO: 0.03 K/UL (ref 0–0.51)
EOSINOPHIL NFR BLD: 0.4 % (ref 0–6.9)
ERYTHROCYTE [DISTWIDTH] IN BLOOD BY AUTOMATED COUNT: 49.1 FL (ref 35.9–50)
GFR SERPLBLD CREATININE-BSD FMLA CKD-EPI: 53 ML/MIN/1.73 M 2
GLOBULIN SER CALC-MCNC: 3 G/DL (ref 1.9–3.5)
GLUCOSE BLD STRIP.AUTO-MCNC: 186 MG/DL (ref 65–99)
GLUCOSE BLD STRIP.AUTO-MCNC: 31 MG/DL (ref 65–99)
GLUCOSE BLD STRIP.AUTO-MCNC: 48 MG/DL (ref 65–99)
GLUCOSE SERPL-MCNC: 33 MG/DL (ref 65–99)
HCT VFR BLD AUTO: 32.1 % (ref 42–52)
HGB BLD-MCNC: 10 G/DL (ref 14–18)
IMM GRANULOCYTES # BLD AUTO: 0.03 K/UL (ref 0–0.11)
IMM GRANULOCYTES NFR BLD AUTO: 0.4 % (ref 0–0.9)
LYMPHOCYTES # BLD AUTO: 1.28 K/UL (ref 1–4.8)
LYMPHOCYTES NFR BLD: 18.5 % (ref 22–41)
MCH RBC QN AUTO: 26.3 PG (ref 27–33)
MCHC RBC AUTO-ENTMCNC: 31.2 G/DL (ref 33.7–35.3)
MCV RBC AUTO: 84.5 FL (ref 81.4–97.8)
MONOCYTES # BLD AUTO: 0.38 K/UL (ref 0–0.85)
MONOCYTES NFR BLD AUTO: 5.5 % (ref 0–13.4)
NEUTROPHILS # BLD AUTO: 5.18 K/UL (ref 1.82–7.42)
NEUTROPHILS NFR BLD: 74.8 % (ref 44–72)
NRBC # BLD AUTO: 0 K/UL
NRBC BLD-RTO: 0 /100 WBC
PLATELET # BLD AUTO: 215 K/UL (ref 164–446)
PMV BLD AUTO: 10.7 FL (ref 9–12.9)
POTASSIUM SERPL-SCNC: 3.8 MMOL/L (ref 3.6–5.5)
PROT SERPL-MCNC: 6.9 G/DL (ref 6–8.2)
RBC # BLD AUTO: 3.8 M/UL (ref 4.7–6.1)
SODIUM SERPL-SCNC: 134 MMOL/L (ref 135–145)
WBC # BLD AUTO: 6.9 K/UL (ref 4.8–10.8)

## 2022-03-20 PROCEDURE — 96374 THER/PROPH/DIAG INJ IV PUSH: CPT

## 2022-03-20 PROCEDURE — 82962 GLUCOSE BLOOD TEST: CPT | Mod: 91

## 2022-03-20 PROCEDURE — 99284 EMERGENCY DEPT VISIT MOD MDM: CPT

## 2022-03-20 PROCEDURE — 80053 COMPREHEN METABOLIC PANEL: CPT

## 2022-03-20 PROCEDURE — 85025 COMPLETE CBC W/AUTO DIFF WBC: CPT

## 2022-03-20 PROCEDURE — 700105 HCHG RX REV CODE 258: Performed by: EMERGENCY MEDICINE

## 2022-03-20 RX ADMIN — DEXTROSE MONOHYDRATE 25 G: 100 INJECTION, SOLUTION INTRAVENOUS at 21:47

## 2022-03-21 NOTE — ED TRIAGE NOTES
Chief Complaint   Patient presents with   • Shortness of Breath     Pt states he has been short of breath for yesterday    • Hypoglycemia     Pts blood sugar 48     Pt ambulatory to triage for above complaint. pts blood sugar 48, pt provided with orange juice. pt iss alert/oriented and follows commands. Pt speaking in full sentences and responds appropriately to questions. No acute distress noted in triage and respirations are even and unlabored.     Pt placed in lobby and educated on triage process. Pt encouraged to alert staff for any changes in condition.

## 2022-03-21 NOTE — ED PROVIDER NOTES
ED Provider Note    Scribed for Dr. Duane Salamanca M.D. by Angus Julien. 3/20/2022  9:43 PM    Primary care provider: Mitesh Sorto M.D.  Means of arrival: Walk-in  History obtained from: Patient  History limited by: None    CHIEF COMPLAINT  Chief Complaint   Patient presents with   • Shortness of Breath     Pt states he has been short of breath for yesterday    • Hypoglycemia     Pts blood sugar 48     HPI  Pac Inna is a 66 y.o. male who presents to the Emergency Department for acute, mild diarrhea onset yesterday. Per patient, he has been experiencing diarrhea since yesterday with his last meal being today at noon. He is on 30 long acting units of insulin once a day which he takes in the morning. He has associated symptoms of shortness of breath and hypoglycemia, but denies fever. No alleviating or exacerbating factors reported.    REVIEW OF SYSTEMS  Pertinent positives include diarrhea, shortness of breath, and hypoglycemia. Pertinent negatives include no fever. As above, all other systems reviewed and are negative.   See HPI for further details.     PAST MEDICAL HISTORY   has a past medical history of Diabetes ([facility]), DM type 2 (diabetes mellitus, type 2) (HCC) (2016), Essential hypertension (2016), GERD (gastroesophageal reflux disease) (2016), Hepatitis B (2016), Hyperlipidemia (2016), and Vitamin D deficiency (2016).    SURGICAL HISTORY  patient denies any surgical history    SOCIAL HISTORY  Social History     Tobacco Use   • Smoking status: Current Every Day Smoker     Packs/day: 0.50     Types: Cigarettes     Last attempt to quit: 12/3/2016     Years since quittin.2   • Smokeless tobacco: Never Used   • Tobacco comment: 10 CIGARETTES A DAY   Vaping Use   • Vaping Use: Never used   Substance Use Topics   • Alcohol use: No     Alcohol/week: 0.0 oz   • Drug use: No      Social History     Substance and Sexual Activity   Drug Use No       FAMILY HISTORY  History reviewed. No  "pertinent family history.    CURRENT MEDICATIONS  Home Medications     Reviewed by Marlon Ramires R.N. (Registered Nurse) on 03/20/22 at 2123  Med List Status: <None>   Medication Last Dose Status   amoxicillin-clavulanate (AUGMENTIN) 875-125 MG Tab  Active   aspirin (ASA) 81 MG Chew Tab chewable tablet  Active   benzonatate (TESSALON) 100 MG Cap  Active   Blood Glucose Monitoring Suppl (ONE TOUCH ULTRA 2) W/DEVICE Kit  Active   glucose blood strip  Active   glucose blood strip  Active   insulin glargine (LANTUS SOLOSTAR) 100 UNIT/ML Solution Pen-injector injection  Active   Insulin Pen Needle 31G X 8 MM Misc  Active   LANTUS SOLOSTAR 100 UNIT/ML Solution Pen-injector injection  Active   loratadine (CLARITIN) 10 MG Tab  Active   losartan (COZAAR) 25 MG Tab  Active   meloxicam (MOBIC) 15 MG tablet  Active   metFORMIN (GLUCOPHAGE) 500 MG Tab  Active   omeprazole (PRILOSEC) 20 MG delayed-release capsule  Active   simvastatin (ZOCOR) 40 MG Tab  Active   vitamin D (CHOLECALCIFEROL) 1000 UNIT Tab  Active                ALLERGIES  No Known Allergies    PHYSICAL EXAM  VITAL SIGNS: /70   Pulse (!) 103   Temp 36.6 °C (97.8 °F) (Temporal)   Resp 16   Ht 1.626 m (5' 4\")   Wt 77.7 kg (171 lb 4.8 oz)   SpO2 95%   BMI 29.40 kg/m²     Constitutional: Well developed, Well nourished, No acute distress, Non-toxic appearance.   HENT: Normocephalic, Atraumatic, Bilateral external ears normal, Dry mouth, No oral exudates.   Eyes: PERRLA, EOMI, Conjunctiva normal, No discharge.   Neck: No tenderness, Supple, No stridor.   Lymphatic: No lymphadenopathy noted.   Cardiovascular: Normal heart rate, Normal rhythm.   Thorax & Lungs: Clear to auscultation bilaterally, No respiratory distress, No wheezing, No crackles.   Abdomen: Soft, No tenderness, No masses, No pulsatile masses.   Skin: Warm, Dry, No erythema, No rash.   Extremities:, No edema No cyanosis.   Musculoskeletal: No tenderness to palpation or major deformities noted. "  Intact distal pulses  Neurologic: Awake, alert. Moves all extremities spontaneously.  Psychiatric: Affect normal, Judgment normal, Mood normal.     LABS  Results for orders placed or performed during the hospital encounter of 03/20/22   CBC WITH DIFFERENTIAL   Result Value Ref Range    WBC 6.9 4.8 - 10.8 K/uL    RBC 3.80 (L) 4.70 - 6.10 M/uL    Hemoglobin 10.0 (L) 14.0 - 18.0 g/dL    Hematocrit 32.1 (L) 42.0 - 52.0 %    MCV 84.5 81.4 - 97.8 fL    MCH 26.3 (L) 27.0 - 33.0 pg    MCHC 31.2 (L) 33.7 - 35.3 g/dL    RDW 49.1 35.9 - 50.0 fL    Platelet Count 215 164 - 446 K/uL    MPV 10.7 9.0 - 12.9 fL    Neutrophils-Polys 74.80 (H) 44.00 - 72.00 %    Lymphocytes 18.50 (L) 22.00 - 41.00 %    Monocytes 5.50 0.00 - 13.40 %    Eosinophils 0.40 0.00 - 6.90 %    Basophils 0.40 0.00 - 1.80 %    Immature Granulocytes 0.40 0.00 - 0.90 %    Nucleated RBC 0.00 /100 WBC    Neutrophils (Absolute) 5.18 1.82 - 7.42 K/uL    Lymphs (Absolute) 1.28 1.00 - 4.80 K/uL    Monos (Absolute) 0.38 0.00 - 0.85 K/uL    Eos (Absolute) 0.03 0.00 - 0.51 K/uL    Baso (Absolute) 0.03 0.00 - 0.12 K/uL    Immature Granulocytes (abs) 0.03 0.00 - 0.11 K/uL    NRBC (Absolute) 0.00 K/uL   COMP METABOLIC PANEL   Result Value Ref Range    Sodium 134 (L) 135 - 145 mmol/L    Potassium 3.8 3.6 - 5.5 mmol/L    Chloride 101 96 - 112 mmol/L    Co2 19 (L) 20 - 33 mmol/L    Anion Gap 14.0 7.0 - 16.0    Glucose 33 (LL) 65 - 99 mg/dL    Bun 25 (H) 8 - 22 mg/dL    Creatinine 1.44 (H) 0.50 - 1.40 mg/dL    Calcium 8.8 8.5 - 10.5 mg/dL    AST(SGOT) 56 (H) 12 - 45 U/L    ALT(SGPT) 40 2 - 50 U/L    Alkaline Phosphatase 77 30 - 99 U/L    Total Bilirubin 0.7 0.1 - 1.5 mg/dL    Albumin 3.9 3.2 - 4.9 g/dL    Total Protein 6.9 6.0 - 8.2 g/dL    Globulin 3.0 1.9 - 3.5 g/dL    A-G Ratio 1.3 g/dL   ESTIMATED GFR   Result Value Ref Range    GFR (CKD-EPI) 53 (A) >60 mL/min/1.73 m 2   POCT glucose device results   Result Value Ref Range    POC Glucose, Blood 48 (L) 65 - 99 mg/dL   POCT  glucose device results   Result Value Ref Range    POC Glucose, Blood 31 (LL) 65 - 99 mg/dL     All labs reviewed by me.    RADIOLOGY  No orders to display     The radiologist's interpretation of all radiological studies have been reviewed by me.    COURSE & MEDICAL DECISION MAKING  Pertinent Labs & Imaging studies reviewed. (See chart for details)    9:43 PM - Patient seen and examined at bedside. Patient will be treated with dextrose 10% 25 mg. Ordered CBC w/ Diff, CMP, POCT UA, and POCT Glucose to evaluate his symptoms.    Decision Making:  This is a patient with a fairly severe hyperglycemia.  He is a type II diabetic on insulin once a day he did not eat today sugar was in the 30s although he is tolerating fairly well given IV dextrose and will be given food his blood sugar has maintained now he will be discharged home I advised half the usual dose of insulin in the morning which is when he usually takes his once a day regimen    RONI  IMPRESSION  1. Hypoglycemia          Angus LAGOS (Scribe), am scribing for, and in the presence of, Duane Salamanca M.D..    Electronically signed by: Angus Julien (Shitalibconcepción), 3/20/2022    Daune LAGOS M.D. personally performed the services described in this documentation, as scribed by Angus Julien in my presence, and it is both accurate and complete.    The note accurately reflects work and decisions made by me.  Duane Salamanca M.D.  3/20/2022  10:57 PM

## 2022-03-21 NOTE — ED NOTES
Glucometer 30, then repeat test 31. ERP notified, ERP assessing pt, IV established and dextrose IV infusing. Pt awake and alert, feeling fine, just c/o edema to feet. Daughter at bedside to translate. Pt last ate a meal at noon.

## 2022-03-21 NOTE — ED NOTES
Discharge instructions given and explained to daughter and pt. All questions answered and pt verbalized understanding.

## 2022-03-28 ENCOUNTER — HOSPITAL ENCOUNTER (OUTPATIENT)
Dept: LAB | Facility: MEDICAL CENTER | Age: 67
End: 2022-03-28
Attending: INTERNAL MEDICINE
Payer: MEDICARE

## 2022-03-28 DIAGNOSIS — E55.9 VITAMIN D DEFICIENCY: ICD-10-CM

## 2022-03-28 DIAGNOSIS — E78.5 DYSLIPIDEMIA: ICD-10-CM

## 2022-03-28 DIAGNOSIS — Z12.5 SCREENING FOR MALIGNANT NEOPLASM OF PROSTATE: ICD-10-CM

## 2022-03-28 DIAGNOSIS — E11.42 DIABETIC POLYNEUROPATHY ASSOCIATED WITH TYPE 2 DIABETES MELLITUS (HCC): ICD-10-CM

## 2022-03-28 DIAGNOSIS — I10 ESSENTIAL HYPERTENSION: ICD-10-CM

## 2022-03-28 DIAGNOSIS — B19.10 HEPATITIS B INFECTION WITHOUT DELTA AGENT WITHOUT HEPATIC COMA, UNSPECIFIED CHRONICITY: ICD-10-CM

## 2022-03-28 LAB
25(OH)D3 SERPL-MCNC: 34 NG/ML (ref 30–100)
ALBUMIN SERPL BCP-MCNC: 4.1 G/DL (ref 3.2–4.9)
ALP SERPL-CCNC: 85 U/L (ref 30–99)
ALT SERPL-CCNC: 45 U/L (ref 2–50)
ANION GAP SERPL CALC-SCNC: 15 MMOL/L (ref 7–16)
AST SERPL-CCNC: 55 U/L (ref 12–45)
BILIRUB CONJ SERPL-MCNC: 0.4 MG/DL (ref 0.1–0.5)
BILIRUB INDIRECT SERPL-MCNC: 0.6 MG/DL (ref 0–1)
BILIRUB SERPL-MCNC: 1 MG/DL (ref 0.1–1.5)
BUN SERPL-MCNC: 23 MG/DL (ref 8–22)
CALCIUM SERPL-MCNC: 8.8 MG/DL (ref 8.5–10.5)
CHLORIDE SERPL-SCNC: 102 MMOL/L (ref 96–112)
CHOLEST SERPL-MCNC: 91 MG/DL (ref 100–199)
CO2 SERPL-SCNC: 19 MMOL/L (ref 20–33)
CREAT SERPL-MCNC: 1.15 MG/DL (ref 0.5–1.4)
CREAT UR-MCNC: 67.54 MG/DL
ERYTHROCYTE [DISTWIDTH] IN BLOOD BY AUTOMATED COUNT: 50.8 FL (ref 35.9–50)
EST. AVERAGE GLUCOSE BLD GHB EST-MCNC: 154 MG/DL
FASTING STATUS PATIENT QL REPORTED: NORMAL
GFR SERPLBLD CREATININE-BSD FMLA CKD-EPI: 69 ML/MIN/1.73 M 2
GLUCOSE SERPL-MCNC: 86 MG/DL (ref 65–99)
HBA1C MFR BLD: 7 % (ref 4–5.6)
HBV SURFACE AB SERPL IA-ACNC: 1001 MIU/ML (ref 0–10)
HBV SURFACE AG SER QL: NORMAL
HCT VFR BLD AUTO: 32.5 % (ref 42–52)
HCV AB SER QL: NORMAL
HDLC SERPL-MCNC: 34 MG/DL
HGB BLD-MCNC: 10.1 G/DL (ref 14–18)
LDLC SERPL CALC-MCNC: 48 MG/DL
MCH RBC QN AUTO: 26 PG (ref 27–33)
MCHC RBC AUTO-ENTMCNC: 31.1 G/DL (ref 33.7–35.3)
MCV RBC AUTO: 83.8 FL (ref 81.4–97.8)
MICROALBUMIN UR-MCNC: 18 MG/DL
MICROALBUMIN/CREAT UR: 267 MG/G (ref 0–30)
PLATELET # BLD AUTO: 217 K/UL (ref 164–446)
PMV BLD AUTO: 10.7 FL (ref 9–12.9)
POTASSIUM SERPL-SCNC: 4.2 MMOL/L (ref 3.6–5.5)
PROT SERPL-MCNC: 6.6 G/DL (ref 6–8.2)
PSA SERPL-MCNC: 0.52 NG/ML (ref 0–4)
RBC # BLD AUTO: 3.88 M/UL (ref 4.7–6.1)
SODIUM SERPL-SCNC: 136 MMOL/L (ref 135–145)
TRIGL SERPL-MCNC: 46 MG/DL (ref 0–149)
TSH SERPL DL<=0.005 MIU/L-ACNC: 3.04 UIU/ML (ref 0.38–5.33)
WBC # BLD AUTO: 9.7 K/UL (ref 4.8–10.8)

## 2022-03-28 PROCEDURE — 86803 HEPATITIS C AB TEST: CPT

## 2022-03-28 PROCEDURE — 80061 LIPID PANEL: CPT

## 2022-03-28 PROCEDURE — 85027 COMPLETE CBC AUTOMATED: CPT

## 2022-03-28 PROCEDURE — 87340 HEPATITIS B SURFACE AG IA: CPT | Mod: GA

## 2022-03-28 PROCEDURE — 36415 COLL VENOUS BLD VENIPUNCTURE: CPT

## 2022-03-28 PROCEDURE — 80076 HEPATIC FUNCTION PANEL: CPT

## 2022-03-28 PROCEDURE — 82043 UR ALBUMIN QUANTITATIVE: CPT

## 2022-03-28 PROCEDURE — 83036 HEMOGLOBIN GLYCOSYLATED A1C: CPT | Mod: GA

## 2022-03-28 PROCEDURE — 82570 ASSAY OF URINE CREATININE: CPT

## 2022-03-28 PROCEDURE — 84443 ASSAY THYROID STIM HORMONE: CPT

## 2022-03-28 PROCEDURE — 86706 HEP B SURFACE ANTIBODY: CPT | Mod: GA

## 2022-03-28 PROCEDURE — 80048 BASIC METABOLIC PNL TOTAL CA: CPT

## 2022-03-28 PROCEDURE — 82306 VITAMIN D 25 HYDROXY: CPT

## 2022-03-28 PROCEDURE — 84153 ASSAY OF PSA TOTAL: CPT | Mod: GA

## 2022-03-29 ENCOUNTER — TELEPHONE (OUTPATIENT)
Dept: MEDICAL GROUP | Facility: PHYSICIAN GROUP | Age: 67
End: 2022-03-29
Payer: MEDICARE

## 2022-03-29 DIAGNOSIS — Z86.19 HISTORY OF HEPATITIS B: ICD-10-CM

## 2022-03-29 DIAGNOSIS — R80.0 ISOLATED PROTEINURIA WITHOUT SPECIFIC MORPHOLOGIC LESION: ICD-10-CM

## 2022-03-29 DIAGNOSIS — R74.8 LIVER ENZYME ELEVATION: ICD-10-CM

## 2022-03-29 DIAGNOSIS — D64.9 ANEMIA, UNSPECIFIED TYPE: ICD-10-CM

## 2022-03-29 NOTE — TELEPHONE ENCOUNTER
----- Message from Mitesh Sorto M.D. sent at 3/29/2022  8:41 AM PDT -----  Labs back pls notify pt    Blood test showed anemia [low red blood count] needing further investigation.    Recommendation: please return  to the lab for additional testings: Iron, Ferritin, Folate, Vit B12 and  stool test to check for occult blood loss.  Recommendation: A referral has been submitted to GASTROENTEROLOGY  for a consultation.  After a few days, please call [facility] Referral Department at [phone] to check the status authorization of the referral.    Blood tests showed slightly elevated Liver enzyme needing further investigation.   Liver Ultrasound ordered  Please call [facility] Radiology [phone] to schedule the appointment.   Please followup with primary care provider Mitesh Sorto M.D.  after Ultrasound and labs done     Urine test showed protein.  Recommendation: 24 urine protein test has been ordered for further evaluation. Please return to [facility] lab at your earliest convenience.     A1c [diabetes test]    7%   =  excellent   diabetes control.    Blood test showed pt is immuned to hepatitis B. No treatment required     Advise the patient to follow-up with Dr. Sorto after the above studies are done.

## 2022-04-02 ENCOUNTER — APPOINTMENT (OUTPATIENT)
Dept: RADIOLOGY | Facility: MEDICAL CENTER | Age: 67
DRG: 246 | End: 2022-04-02
Attending: EMERGENCY MEDICINE
Payer: MEDICARE

## 2022-04-02 ENCOUNTER — HOSPITAL ENCOUNTER (INPATIENT)
Facility: MEDICAL CENTER | Age: 67
LOS: 6 days | DRG: 246 | End: 2022-04-08
Attending: EMERGENCY MEDICINE | Admitting: HOSPITALIST
Payer: MEDICARE

## 2022-04-02 DIAGNOSIS — R60.1 ANASARCA: ICD-10-CM

## 2022-04-02 DIAGNOSIS — I50.9 CONGESTIVE HEART FAILURE OF UNKNOWN ETIOLOGY (HCC): Chronic | ICD-10-CM

## 2022-04-02 DIAGNOSIS — I50.21 ACUTE SYSTOLIC HEART FAILURE (HCC): ICD-10-CM

## 2022-04-02 PROBLEM — R18.8 PELVIC FLUID COLLECTION: Status: ACTIVE | Noted: 2022-04-02

## 2022-04-02 PROBLEM — F17.200 TOBACCO DEPENDENCE: Status: ACTIVE | Noted: 2022-04-02

## 2022-04-02 PROBLEM — R60.0 LEG EDEMA: Status: ACTIVE | Noted: 2022-04-02

## 2022-04-02 PROBLEM — R79.89 ELEVATED TROPONIN: Status: ACTIVE | Noted: 2022-04-02

## 2022-04-02 PROBLEM — K40.90 RIGHT INGUINAL HERNIA: Status: ACTIVE | Noted: 2022-04-02

## 2022-04-02 LAB
ALBUMIN SERPL BCP-MCNC: 3.9 G/DL (ref 3.2–4.9)
ALBUMIN/GLOB SERPL: 1.2 G/DL
ALP SERPL-CCNC: 84 U/L (ref 30–99)
ALT SERPL-CCNC: 31 U/L (ref 2–50)
ANION GAP SERPL CALC-SCNC: 11 MMOL/L (ref 7–16)
AST SERPL-CCNC: 30 U/L (ref 12–45)
BASOPHILS # BLD AUTO: 0.8 % (ref 0–1.8)
BASOPHILS # BLD: 0.06 K/UL (ref 0–0.12)
BILIRUB SERPL-MCNC: 0.8 MG/DL (ref 0.1–1.5)
BUN SERPL-MCNC: 19 MG/DL (ref 8–22)
CALCIUM SERPL-MCNC: 9.1 MG/DL (ref 8.5–10.5)
CHLORIDE SERPL-SCNC: 103 MMOL/L (ref 96–112)
CO2 SERPL-SCNC: 22 MMOL/L (ref 20–33)
CREAT SERPL-MCNC: 1.13 MG/DL (ref 0.5–1.4)
EKG IMPRESSION: NORMAL
EOSINOPHIL # BLD AUTO: 0.6 K/UL (ref 0–0.51)
EOSINOPHIL NFR BLD: 7.7 % (ref 0–6.9)
ERYTHROCYTE [DISTWIDTH] IN BLOOD BY AUTOMATED COUNT: 49.5 FL (ref 35.9–50)
GFR SERPLBLD CREATININE-BSD FMLA CKD-EPI: 71 ML/MIN/1.73 M 2
GLOBULIN SER CALC-MCNC: 3.2 G/DL (ref 1.9–3.5)
GLUCOSE BLD STRIP.AUTO-MCNC: 158 MG/DL (ref 65–99)
GLUCOSE BLD STRIP.AUTO-MCNC: 76 MG/DL (ref 65–99)
GLUCOSE SERPL-MCNC: 169 MG/DL (ref 65–99)
HCT VFR BLD AUTO: 31.8 % (ref 42–52)
HGB BLD-MCNC: 9.9 G/DL (ref 14–18)
IMM GRANULOCYTES # BLD AUTO: 0.01 K/UL (ref 0–0.11)
IMM GRANULOCYTES NFR BLD AUTO: 0.1 % (ref 0–0.9)
LYMPHOCYTES # BLD AUTO: 2.54 K/UL (ref 1–4.8)
LYMPHOCYTES NFR BLD: 32.7 % (ref 22–41)
MCH RBC QN AUTO: 25.3 PG (ref 27–33)
MCHC RBC AUTO-ENTMCNC: 31.1 G/DL (ref 33.7–35.3)
MCV RBC AUTO: 81.3 FL (ref 81.4–97.8)
MONOCYTES # BLD AUTO: 0.58 K/UL (ref 0–0.85)
MONOCYTES NFR BLD AUTO: 7.5 % (ref 0–13.4)
NEUTROPHILS # BLD AUTO: 3.97 K/UL (ref 1.82–7.42)
NEUTROPHILS NFR BLD: 51.2 % (ref 44–72)
NRBC # BLD AUTO: 0 K/UL
NRBC BLD-RTO: 0 /100 WBC
NT-PROBNP SERPL IA-MCNC: ABNORMAL PG/ML (ref 0–125)
PLATELET # BLD AUTO: 211 K/UL (ref 164–446)
PMV BLD AUTO: 9.5 FL (ref 9–12.9)
POTASSIUM SERPL-SCNC: 4.4 MMOL/L (ref 3.6–5.5)
PROT SERPL-MCNC: 7.1 G/DL (ref 6–8.2)
RBC # BLD AUTO: 3.91 M/UL (ref 4.7–6.1)
SODIUM SERPL-SCNC: 136 MMOL/L (ref 135–145)
TROPONIN T SERPL-MCNC: 25 NG/L (ref 6–19)
TROPONIN T SERPL-MCNC: 25 NG/L (ref 6–19)
WBC # BLD AUTO: 7.8 K/UL (ref 4.8–10.8)

## 2022-04-02 PROCEDURE — 93005 ELECTROCARDIOGRAM TRACING: CPT

## 2022-04-02 PROCEDURE — 700111 HCHG RX REV CODE 636 W/ 250 OVERRIDE (IP): Performed by: EMERGENCY MEDICINE

## 2022-04-02 PROCEDURE — 85025 COMPLETE CBC W/AUTO DIFF WBC: CPT

## 2022-04-02 PROCEDURE — 93971 EXTREMITY STUDY: CPT | Mod: RT

## 2022-04-02 PROCEDURE — 700102 HCHG RX REV CODE 250 W/ 637 OVERRIDE(OP): Performed by: HOSPITALIST

## 2022-04-02 PROCEDURE — A9270 NON-COVERED ITEM OR SERVICE: HCPCS | Performed by: HOSPITALIST

## 2022-04-02 PROCEDURE — 84484 ASSAY OF TROPONIN QUANT: CPT

## 2022-04-02 PROCEDURE — G1004 CDSM NDSC: HCPCS

## 2022-04-02 PROCEDURE — 71045 X-RAY EXAM CHEST 1 VIEW: CPT

## 2022-04-02 PROCEDURE — 93005 ELECTROCARDIOGRAM TRACING: CPT | Performed by: EMERGENCY MEDICINE

## 2022-04-02 PROCEDURE — 96374 THER/PROPH/DIAG INJ IV PUSH: CPT

## 2022-04-02 PROCEDURE — 770020 HCHG ROOM/CARE - TELE (206)

## 2022-04-02 PROCEDURE — 99285 EMERGENCY DEPT VISIT HI MDM: CPT

## 2022-04-02 PROCEDURE — 82962 GLUCOSE BLOOD TEST: CPT

## 2022-04-02 PROCEDURE — 83880 ASSAY OF NATRIURETIC PEPTIDE: CPT

## 2022-04-02 PROCEDURE — 36415 COLL VENOUS BLD VENIPUNCTURE: CPT

## 2022-04-02 PROCEDURE — 99223 1ST HOSP IP/OBS HIGH 75: CPT | Mod: AI | Performed by: HOSPITALIST

## 2022-04-02 PROCEDURE — 700111 HCHG RX REV CODE 636 W/ 250 OVERRIDE (IP): Performed by: HOSPITALIST

## 2022-04-02 PROCEDURE — 700117 HCHG RX CONTRAST REV CODE 255: Performed by: EMERGENCY MEDICINE

## 2022-04-02 PROCEDURE — 80053 COMPREHEN METABOLIC PANEL: CPT

## 2022-04-02 RX ORDER — BISACODYL 10 MG
10 SUPPOSITORY, RECTAL RECTAL
Status: DISCONTINUED | OUTPATIENT
Start: 2022-04-02 | End: 2022-04-08 | Stop reason: HOSPADM

## 2022-04-02 RX ORDER — ACETAMINOPHEN 325 MG/1
650 TABLET ORAL EVERY 6 HOURS PRN
Status: DISCONTINUED | OUTPATIENT
Start: 2022-04-02 | End: 2022-04-08 | Stop reason: HOSPADM

## 2022-04-02 RX ORDER — FUROSEMIDE 10 MG/ML
40 INJECTION INTRAMUSCULAR; INTRAVENOUS DAILY
Status: DISCONTINUED | OUTPATIENT
Start: 2022-04-03 | End: 2022-04-02

## 2022-04-02 RX ORDER — FUROSEMIDE 10 MG/ML
40 INJECTION INTRAMUSCULAR; INTRAVENOUS
Status: DISCONTINUED | OUTPATIENT
Start: 2022-04-02 | End: 2022-04-05

## 2022-04-02 RX ORDER — ONDANSETRON 2 MG/ML
4 INJECTION INTRAMUSCULAR; INTRAVENOUS EVERY 4 HOURS PRN
Status: DISCONTINUED | OUTPATIENT
Start: 2022-04-02 | End: 2022-04-08 | Stop reason: HOSPADM

## 2022-04-02 RX ORDER — BENZONATATE 100 MG/1
100 CAPSULE ORAL 3 TIMES DAILY PRN
Status: DISCONTINUED | OUTPATIENT
Start: 2022-04-02 | End: 2022-04-08 | Stop reason: HOSPADM

## 2022-04-02 RX ORDER — SIMVASTATIN 40 MG
40 TABLET ORAL EVERY EVENING
Status: DISCONTINUED | OUTPATIENT
Start: 2022-04-02 | End: 2022-04-04

## 2022-04-02 RX ORDER — POTASSIUM CHLORIDE 20 MEQ/1
20 TABLET, EXTENDED RELEASE ORAL DAILY
Status: DISCONTINUED | OUTPATIENT
Start: 2022-04-02 | End: 2022-04-08

## 2022-04-02 RX ORDER — POLYETHYLENE GLYCOL 3350 17 G/17G
1 POWDER, FOR SOLUTION ORAL
Status: DISCONTINUED | OUTPATIENT
Start: 2022-04-02 | End: 2022-04-08 | Stop reason: HOSPADM

## 2022-04-02 RX ORDER — ONDANSETRON 4 MG/1
4 TABLET, ORALLY DISINTEGRATING ORAL EVERY 4 HOURS PRN
Status: DISCONTINUED | OUTPATIENT
Start: 2022-04-02 | End: 2022-04-08 | Stop reason: HOSPADM

## 2022-04-02 RX ORDER — HYDRALAZINE HYDROCHLORIDE 20 MG/ML
10 INJECTION INTRAMUSCULAR; INTRAVENOUS EVERY 4 HOURS PRN
Status: DISCONTINUED | OUTPATIENT
Start: 2022-04-02 | End: 2022-04-08 | Stop reason: HOSPADM

## 2022-04-02 RX ORDER — FUROSEMIDE 10 MG/ML
40 INJECTION INTRAMUSCULAR; INTRAVENOUS ONCE
Status: COMPLETED | OUTPATIENT
Start: 2022-04-02 | End: 2022-04-02

## 2022-04-02 RX ORDER — AMOXICILLIN 250 MG
2 CAPSULE ORAL 2 TIMES DAILY
Status: DISCONTINUED | OUTPATIENT
Start: 2022-04-02 | End: 2022-04-08 | Stop reason: HOSPADM

## 2022-04-02 RX ORDER — VITAMIN B COMPLEX
2000 TABLET ORAL DAILY
Refills: 3 | Status: DISCONTINUED | OUTPATIENT
Start: 2022-04-02 | End: 2022-04-08 | Stop reason: HOSPADM

## 2022-04-02 RX ORDER — LOSARTAN POTASSIUM 25 MG/1
25 TABLET ORAL DAILY
Status: DISCONTINUED | OUTPATIENT
Start: 2022-04-02 | End: 2022-04-08 | Stop reason: HOSPADM

## 2022-04-02 RX ORDER — AMOXICILLIN AND CLAVULANATE POTASSIUM 875; 125 MG/1; MG/1
1 TABLET, FILM COATED ORAL 2 TIMES DAILY
Status: ON HOLD | COMMUNITY
Start: 2022-03-05 | End: 2022-04-08

## 2022-04-02 RX ADMIN — LOSARTAN POTASSIUM 25 MG: 25 TABLET, FILM COATED ORAL at 17:09

## 2022-04-02 RX ADMIN — FUROSEMIDE 40 MG: 10 INJECTION, SOLUTION INTRAMUSCULAR; INTRAVENOUS at 14:14

## 2022-04-02 RX ADMIN — Medication 2000 UNITS: at 17:08

## 2022-04-02 RX ADMIN — IOHEXOL 100 ML: 350 INJECTION, SOLUTION INTRAVENOUS at 14:15

## 2022-04-02 RX ADMIN — SIMVASTATIN 40 MG: 40 TABLET, FILM COATED ORAL at 17:09

## 2022-04-02 RX ADMIN — INSULIN HUMAN 2 UNITS: 100 INJECTION, SOLUTION PARENTERAL at 22:16

## 2022-04-02 RX ADMIN — RIVAROXABAN 10 MG: 10 TABLET, FILM COATED ORAL at 17:08

## 2022-04-02 RX ADMIN — FUROSEMIDE 40 MG: 10 INJECTION, SOLUTION INTRAMUSCULAR; INTRAVENOUS at 17:09

## 2022-04-02 RX ADMIN — POTASSIUM CHLORIDE 20 MEQ: 20 TABLET, EXTENDED RELEASE ORAL at 17:08

## 2022-04-02 RX ADMIN — SENNOSIDES AND DOCUSATE SODIUM 2 TABLET: 50; 8.6 TABLET ORAL at 17:09

## 2022-04-02 ASSESSMENT — PAIN DESCRIPTION - PAIN TYPE
TYPE: ACUTE PAIN
TYPE: ACUTE PAIN

## 2022-04-02 ASSESSMENT — FIBROSIS 4 INDEX
FIB4 SCORE: 1.74
FIB4 SCORE: 2.57

## 2022-04-02 ASSESSMENT — PATIENT HEALTH QUESTIONNAIRE - PHQ9
SUM OF ALL RESPONSES TO PHQ9 QUESTIONS 1 AND 2: 0
1. LITTLE INTEREST OR PLEASURE IN DOING THINGS: NOT AT ALL
2. FEELING DOWN, DEPRESSED, IRRITABLE, OR HOPELESS: NOT AT ALL

## 2022-04-02 ASSESSMENT — PAIN SCALES - WONG BAKER: WONGBAKER_NUMERICALRESPONSE: HURTS A WHOLE LOT

## 2022-04-02 NOTE — ED NOTES
March 16, 2021     Patient: Esteban Nascimento   YOB: 2001       To Whom it May Concern:    Esteban Nascimento has received treatment at this office on the following dates: .today . They may resume work on 3/17/2021 with no restrictions.     If you have any questions or concerns, please don't hesitate to call.      Sincerely,         Serg Strauss MD      Medical information is confidential and cannot be disclosed without the written consent of the patient or his representative.    CC: No Recipients     Pt from triage to ortho 1 by WC. Pt only cantonese speaking.

## 2022-04-02 NOTE — ED NOTES
Med rec updated and complete . Allergies reviewed.   Pt is currently on AUGMENTIN 7 day course. Start date 03/05/22.   Prescription bottle at bedside currently has tablets in it.    Home pharmacy [facility] [phone]      IPAD interpretor FARAZ [id number]

## 2022-04-02 NOTE — ASSESSMENT & PLAN NOTE
Chronic anemia  Patient reporting a 10-day history of GI bleed  Denied GI work-up with EGD or colonoscopy  Hemoglobin has remained stable and above 9 throughout hospital stay\bleed were to be present likely slow but not ruled out  Iron studies suggestive of mild iron deficiency anemia  IV Iron per pharmacy  Occult blood negative

## 2022-04-02 NOTE — H&P
Hospital Medicine History & Physical Note    Date of Service  4/2/2022    Primary Care Physician  Mitesh Sorto M.D.    Consultants  IR Dr Pavon    Code Status  Full Code    Chief Complaint  Chief Complaint   Patient presents with   • Shortness of Breath     X few days. Noted breathing slightly labored.    • Testicle Swelling     X 3 days   • Scrotal Pain     X 3 days. Rates pain as8/10   • Leg Swelling     In left leg x 20 days       History of Presenting Illness  Chucky Keith is a 66 y.o. male who presented 4/2/2022 with generalized edema and right scrotal swelling.  He reports that over the past month he has noted increasing swelling and over the past few days he has noted progressive dyspnea on exertion associated with orthopnea.  He denies any fever or chills.  He became concerned about the increasing scrotal swelling that he presented to the emergency department.  He has a history of diabetes dyslipidemia and hypertension.  He denies any known history of cardiac disease.  No trauma.  No fever no chills.  His diabetes has been controlled on Lantus.  He is complaining of right scrotal pain moderate dull at times sharp worse with movement.  No nausea or vomiting.    I discussed the plan of care with patient, family and ED physician and interventional radiology.    Review of Systems  Review of Systems   All other systems reviewed and are negative.      Past Medical History   has a past medical history of Diabetes (HCC), DM type 2 (diabetes mellitus, type 2) (HCC) (6/23/2016), Essential hypertension (6/23/2016), GERD (gastroesophageal reflux disease) (6/23/2016), Hepatitis B (6/23/2016), Hyperlipidemia (6/23/2016), and Vitamin D deficiency (6/23/2016).    Surgical History  Reviewed and not pertinent to the presenting problem    Family History    Family history reviewed with patient. There is no family history that is pertinent to the chief complaint.     Social History   reports that he has been smoking cigarettes. He  has been smoking about 0.50 packs per day. He has never used smokeless tobacco. He reports that he does not drink alcohol and does not use drugs.    Allergies  No Known Allergies    Medications  Prior to Admission Medications   Prescriptions Last Dose Informant Patient Reported? Taking?   amoxicillin-clavulanate (AUGMENTIN) 875-125 MG Tab 3/11/2022 at finished Patient Yes Yes   Sig: Take 1 Tablet by mouth 2 times a day.   benzonatate (TESSALON) 100 MG Cap 4/1/2022 at 2100 Patient No No   Sig: Take 1 Capsule by mouth 3 times a day as needed for Cough.   insulin glargine (LANTUS SOLOSTAR) 100 UNIT/ML Solution Pen-injector injection 4/1/2022 at 2100 Patient No No   Sig: INJECT 30 UNITS SUBCUTANEOUSLY EVERY NIGHT AT BEDTIME   Patient taking differently: Inject 15 Units under the skin every evening. INJECT 30 UNITS SUBCUTANEOUSLY EVERY NIGHT AT BEDTIME   losartan (COZAAR) 25 MG Tab 4/1/2022 at 0900 Patient No No   Sig: Take 1 Tablet by mouth every day.   meloxicam (MOBIC) 15 MG tablet 4/1/2022 at 0900 Patient No No   Sig: Take 1 Tablet by mouth 1 time a day as needed.   Patient taking differently: Take 15 mg by mouth every day.   metFORMIN (GLUCOPHAGE) 500 MG Tab 4/1/2022 at 1700 Patient No No   Sig: Take 1 Tablet by mouth 2 times a day with meals.   simvastatin (ZOCOR) 40 MG Tab 4/1/2022 at 2100 Patient No No   Sig: Take 1 Tablet by mouth every evening.   vitamin D (CHOLECALCIFEROL) 1000 UNIT Tab 4/1/2022 at 0900 Patient No No   Sig: Take 2 Tabs by mouth every day.      Facility-Administered Medications: None       Physical Exam  Temp:  [36.4 °C (97.5 °F)] 36.4 °C (97.5 °F)  Pulse:  [] 106  Resp:  [20-33] 33  BP: (138-141)/(81-94) 140/94  SpO2:  [98 %] 98 %  Blood Pressure : 140/94   Temperature: 36.4 °C (97.5 °F)   Pulse: (!) 106   Respiration: (!) 33   Pulse Oximetry: 98 %       Physical Exam  Vitals and nursing note reviewed.   Constitutional:       Appearance: He is well-developed. He is obese. He is not  diaphoretic.   HENT:      Head: Normocephalic and atraumatic.      Mouth/Throat:      Pharynx: No oropharyngeal exudate.   Eyes:      General: No scleral icterus.        Right eye: No discharge.         Left eye: No discharge.      Conjunctiva/sclera: Conjunctivae normal.      Pupils: Pupils are equal, round, and reactive to light.   Neck:      Vascular: No JVD.      Trachea: No tracheal deviation.   Cardiovascular:      Rate and Rhythm: Regular rhythm. Tachycardia present.      Heart sounds: No murmur heard.    No friction rub. No gallop.   Pulmonary:      Effort: Pulmonary effort is normal. No respiratory distress.      Breath sounds: No stridor. Rales present. No wheezing.   Chest:      Chest wall: No tenderness.   Abdominal:      General: Bowel sounds are normal. There is no distension.      Palpations: Abdomen is soft.      Tenderness: There is no abdominal tenderness. There is no rebound.      Hernia: A hernia (Right inguinal) is present.   Musculoskeletal:         General: Swelling present. No tenderness.      Cervical back: Neck supple.      Right lower leg: Edema present.      Left lower leg: Edema present.   Skin:     General: Skin is warm and dry.      Nails: There is no clubbing.   Neurological:      Mental Status: He is alert and oriented to person, place, and time.      Cranial Nerves: No cranial nerve deficit.      Motor: No abnormal muscle tone.   Psychiatric:         Behavior: Behavior normal.         Laboratory:  Recent Labs     04/02/22  1131   WBC 7.8   RBC 3.91*   HEMOGLOBIN 9.9*   HEMATOCRIT 31.8*   MCV 81.3*   MCH 25.3*   MCHC 31.1*   RDW 49.5   PLATELETCT 211   MPV 9.5     Recent Labs     04/02/22  1131   SODIUM 136   POTASSIUM 4.4   CHLORIDE 103   CO2 22   GLUCOSE 169*   BUN 19   CREATININE 1.13   CALCIUM 9.1     Recent Labs     04/02/22  1131   ALTSGPT 31   ASTSGOT 30   ALKPHOSPHAT 84   TBILIRUBIN 0.8   GLUCOSE 169*         Recent Labs     04/02/22  1131   NTPROBNP 99127*         Recent  Labs     04/02/22  1131   TROPONINT 25*       Imaging:  CT-ABDOMEN-PELVIS WITH   Final Result      1.  Diffuse anasarca. Small pleural effusions, mild ascites and body wall and scrotal edema.   2.  Large right inguinal hernia containing a loop of colon. No bowel obstruction.   3.  No definite evidence metastatic disease.   4.  Cardiomegaly.   5.  Nonspecific right iliacus fluid collection measuring 4.1 x 2.9 cm.      DX-CHEST-PORTABLE (1 VIEW)   Final Result      Cardiomegaly with mild interstitial edema consistent with CHF. Possible trace pleural effusions.      US-EXTREMITY VENOUS LOWER UNILAT RIGHT    (Results Pending)   EC-ECHOCARDIOGRAM COMPLETE W/O CONT    (Results Pending)            Assessment/Plan:  I anticipate this patient will require at least two midnights for appropriate medical management, necessitating inpatient admission.    * Anasarca- (present on admission)  Assessment & Plan  Suspect acute CHF    Patient will be admitted and started on IV Lasix  We will check echocardiogram  Continue losartan further work-up depending on echocardiogram results      Elevated troponin  Assessment & Plan  Likely demand ischemia  Recheck troponin follow-up on echocardiogram    Tobacco dependence  Assessment & Plan  Counseled on cessation    Pelvic fluid collection  Assessment & Plan  Reviewed and discussed CT findings with Dr. Pavon from interventional radiology   Dr Pavon has low threshold to drain fluid collection if any signs of infection, patient currently has no clinical signs(afebrile with no leukocytosis) and he would benefit from further stabilization of his anasarca and suspected CHF prior to any invasive procedures  Dr. Pavon recommended ensuring repeat CT in 1 week to follow-up     Right inguinal hernia  Assessment & Plan  Will need surgery evaluation for elective repair    Leg edema  Assessment & Plan  Follow-up on pending venous duplex    Congestive heart failure of unknown etiology (HCC)- (present on  admission)  Assessment & Plan  Start diuresis and check echocardiogram    Anemia- (present on admission)  Assessment & Plan  Chronic anemia  We will check iron panel  Monitor H&H  Patient's PCP notes reviewed he has already initiated GI referral    Essential hypertension- (present on admission)  Assessment & Plan  Continue losartan  Monitor blood pressure    Diabetic polyneuropathy associated with type 2 diabetes mellitus (HCC)- (present on admission)  Assessment & Plan  Continue Lantus  Insulin sliding scale monitor CBGs  Most recent hemoglobin A1c 7.0    Dyslipidemia- (present on admission)  Assessment & Plan  Continue simvastatin    Review of systems and discussion of test results and plan of care discussed with patient and wife at bedside with the assistance of a Cantonese  via video conference    VTE prophylaxis: SCDs/TEDs, Xarelto

## 2022-04-02 NOTE — PROGRESS NOTES
4 Eyes Skin Assessment Completed by FANTASMA Blanco and FANTASMA demarco.    Head WDL  Ears WDL  Nose WDL  Mouth WDL  Neck WDL  Breast/Chest Edema  Shoulder Blades WDL  Spine WDL  (R) Arm/Elbow/Hand Edema  (L) Arm/Elbow/Hand Edema  Abdomen WDL  Groin Redness, Rash and Swelling  Scrotum/Coccyx/Buttocks WDL  (R) Leg Edema  (L) Leg Edema  (R) Heel/Foot/Toe Edema  (L) Heel/Foot/Toe Edema          Devices In Places Tele Box      Interventions In Place Pillows    Possible Skin Injury No    Pictures Uploaded Into Epic N/A  Wound Consult Placed N/A  RN Wound Prevention Protocol Ordered No

## 2022-04-02 NOTE — ED PROVIDER NOTES
ED Provider Note    CHIEF COMPLAINT  Chief Complaint   Patient presents with   • Shortness of Breath     X few days. Noted breathing slightly labored.    • Testicle Swelling     X 3 days   • Scrotal Pain     X 3 days. Rates pain as8/10   • Leg Swelling     In left leg x 20 days       HPI  Pac Inna is a 66 y.o. male who presents complaining of leg swelling, shortness of breath and scrotal swelling.  This last complaint is what brings him here to the ER.  Has been seen for cough, no real diagnosis.  However these have been progressively worse.  He denies any chest pain at all.  He has had increasing shortness of breath worse when lying down.  His legs are also getting more more swollen.  He has had this for a few weeks.  Again the major problem that he has today is that his scrotum is very swollen up and just very uncomfortable.  Denies any abdominal pain.  No change in bowel or bladder.  No fever.  There is no other complaint.    PAST MEDICAL HISTORY  Past Medical History:   Diagnosis Date   • Diabetes (HCC)    • DM type 2 (diabetes mellitus, type 2) (HCC) 2016   • Essential hypertension 2016   • GERD (gastroesophageal reflux disease) 2016   • Hepatitis B 2016   • Hyperlipidemia 2016   • Vitamin D deficiency 2016       FAMILY HISTORY  No family history on file.    SOCIAL HISTORY  Social History     Tobacco Use   • Smoking status: Current Every Day Smoker     Packs/day: 0.50     Types: Cigarettes     Last attempt to quit: 12/3/2016     Years since quittin.3   • Smokeless tobacco: Never Used   • Tobacco comment: 10 CIGARETTES A DAY   Vaping Use   • Vaping Use: Never used   Substance Use Topics   • Alcohol use: No     Alcohol/week: 0.0 oz   • Drug use: No         SURGICAL HISTORY  No past surgical history on file.    CURRENT MEDICATIONS  Home Medications     Reviewed by Anuradha Hinds (Pharmacy Tech) on 22 at 1441  Med List Status: Complete   Medication Last Dose Status  "  amoxicillin-clavulanate (AUGMENTIN) 875-125 MG Tab 4/1/2022 Active   benzonatate (TESSALON) 100 MG Cap 4/1/2022 Active   insulin glargine (LANTUS SOLOSTAR) 100 UNIT/ML Solution Pen-injector injection 4/1/2022 Active   losartan (COZAAR) 25 MG Tab 4/1/2022 Active   meloxicam (MOBIC) 15 MG tablet 4/1/2022 Active   metFORMIN (GLUCOPHAGE) 500 MG Tab 4/1/2022 Active   simvastatin (ZOCOR) 40 MG Tab 4/1/2022 Active   vitamin D (CHOLECALCIFEROL) 1000 UNIT Tab 4/1/2022 Active                I have reviewed the nurses notes and/or the list brought with the patient.    ALLERGIES  No Known Allergies    REVIEW OF SYSTEMS  See HPI for further details. Review of systems as above, otherwise all other systems are negative.     PHYSICAL EXAM  VITAL SIGNS: /94   Pulse (!) 106   Temp 36.4 °C (97.5 °F) (Temporal)   Resp (!) 33   Ht 1.651 m (5' 5\")   Wt 65.8 kg (145 lb)   SpO2 98%   BMI 24.13 kg/m²     Constitutional: Well appearing patient in no acute distress.  Not toxic, nor ill in appearance.  HENT: Mucus membranes moist.  Oropharynx is clear.  Eyes: Pupils equally round.  No scleral icterus.   Neck: Full nontender range of motion.  Lymphatic: No cervical lymphadenopathy noted.   Cardiovascular: Regular heart rate and rhythm.  No murmurs, rubs, nor gallop appreciated.  JVD about 5 to 6 cm.  Thorax & Lungs: Chest is nontender.  Lungs are notable for bibasilar rales, about a quarter the way up bilaterally  Abdomen: Soft, with no tenderness, rebound nor guarding.  No mass, pulsatile mass, nor hepatosplenomegaly appreciated.  : The scrotum is tense.  No erythema warmth.  No tenderness to percussion or palpation.  There is a fullness that seems worse on the right.  With consideration of this is a hernia, attempted reduction, however no apparent change.  There is no tenderness in the groin.  Skin: No purpura nor petechia noted.  Extremities/Musculoskeletal: No sign of trauma.  Calves are nontender with no has bilateral " edema right worse than left.  No Sahil's sign.  Pulses are intact all around.   Neurologic: Alert & oriented.  Strength and sensation is intact all around.  Gait is not assessed  Psychiatric: Normal affect appropriate for the clinical situation.    EKG  I interpreted this EKG myself.  This is a 12-lead study.  The rhythm is sinus tachycardia with a rate of 102.  There are n diffuse, nonspecific ST-T wave abnormalities.  Interpretation: No ST segment elevation myocardial infarction.    LABS  Labs Reviewed   CBC WITH DIFFERENTIAL - Abnormal; Notable for the following components:       Result Value    RBC 3.91 (*)     Hemoglobin 9.9 (*)     Hematocrit 31.8 (*)     MCV 81.3 (*)     MCH 25.3 (*)     MCHC 31.1 (*)     Eosinophils 7.70 (*)     Eos (Absolute) 0.60 (*)     All other components within normal limits   COMP METABOLIC PANEL - Abnormal; Notable for the following components:    Glucose 169 (*)     All other components within normal limits   TROPONIN - Abnormal; Notable for the following components:    Troponin T 25 (*)     All other components within normal limits   PROBRAIN NATRIURETIC PEPTIDE, NT - Abnormal; Notable for the following components:    NT-proBNP 11187 (*)     All other components within normal limits   ESTIMATED GFR         RADIOLOGY/PROCEDURES  I have reviewed the patient's film interpretations myself, and they are read out by the radiologist as:   CT-ABDOMEN-PELVIS WITH   Final Result      1.  Diffuse anasarca. Small pleural effusions, mild ascites and body wall and scrotal edema.   2.  Large right inguinal hernia containing a loop of colon. No bowel obstruction.   3.  No definite evidence metastatic disease.   4.  Cardiomegaly.   5.  Nonspecific right iliacus fluid collection measuring 4.1 x 2.9 cm.      DX-CHEST-PORTABLE (1 VIEW)   Final Result      Cardiomegaly with mild interstitial edema consistent with CHF. Possible trace pleural effusions.      US-EXTREMITY VENOUS LOWER UNILAT RIGHT     (Results Pending)   EC-ECHOCARDIOGRAM COMPLETE W/O CONT    (Results Pending)     .    MEDICAL RECORD  I have reviewed patient's medical record and pertinent results are listed above.    COURSE & MEDICAL DECISION MAKING  I have reviewed any medical record information, laboratory studies and radiographic results as noted above.  This patient presents with leg swelling, scrotal edema.  On exam, he has edema and also some rales in his chest.  Laboratories show no leukocytosis or shift.  He does have interstitial edema and cardiomegaly.  I went ahead and did a CT of his belly, concerned about a possible obstructive process in the pelvis, there is not.  He does have an inguinal hernia but no evidence of bowel obstruction.  Given the amount of anasarca that he has, reduction of that hernia is not possible.  Again he has no tenderness here to suggest incarceration.  Written for IV Lasix.  Discussed patient's case with Dr. Rain, will be admitting him for further work-up and management.    FINAL IMPRESSION  1. Anasarca      2.  Right inguinal hernia     This dictation was created using voice recognition software.    Electronically signed by: Bertin Mcdonald M.D., 4/2/2022 2:55 PM

## 2022-04-02 NOTE — ED TRIAGE NOTES
Chief Complaint   Patient presents with   • Shortness of Breath     X few days. Noted breathing slightly labored.    • Testicle Swelling     X 3 days   • Scrotal Pain     X 3 days. Rates pain as8/10   • Leg Swelling     In left leg x 20 days     Educated on triage process. Instructed to notify staff for any worsening symptoms. Pt cantonese speaking only, ipad  used (Ailin [id number])

## 2022-04-02 NOTE — ASSESSMENT & PLAN NOTE
Reviewed and discussed CT findings with Dr. Pavon from interventional radiology   Dr Pavon has low threshold to drain fluid collection if any signs of infection, patient currently has no clinical signs(afebrile with no leukocytosis) and he would benefit from further stabilization of his anasarca and suspected CHF prior to any invasive procedures  Dr. Pavon recommended ensuring repeat CT in 1 week to follow-up

## 2022-04-02 NOTE — ASSESSMENT & PLAN NOTE
Echo showing EF of 20 %  on IV Lasix  Cardiology following appreciate rec   Had cardiac cath done on 4/4

## 2022-04-02 NOTE — ED NOTES
Pt instructed to tell staff when pt needs to use bathrooom.   Batsheva #[id number]    Ok for pt to eat per ERP.

## 2022-04-03 ENCOUNTER — APPOINTMENT (OUTPATIENT)
Dept: CARDIOLOGY | Facility: MEDICAL CENTER | Age: 67
DRG: 246 | End: 2022-04-03
Attending: STUDENT IN AN ORGANIZED HEALTH CARE EDUCATION/TRAINING PROGRAM
Payer: MEDICARE

## 2022-04-03 PROBLEM — R60.1 ANASARCA: Chronic | Status: ACTIVE | Noted: 2022-04-02

## 2022-04-03 PROBLEM — I50.9 CONGESTIVE HEART FAILURE OF UNKNOWN ETIOLOGY (HCC): Chronic | Status: ACTIVE | Noted: 2022-04-02

## 2022-04-03 LAB
ANION GAP SERPL CALC-SCNC: 9 MMOL/L (ref 7–16)
BASOPHILS # BLD AUTO: 0.8 % (ref 0–1.8)
BASOPHILS # BLD: 0.07 K/UL (ref 0–0.12)
BUN SERPL-MCNC: 19 MG/DL (ref 8–22)
CALCIUM SERPL-MCNC: 9.2 MG/DL (ref 8.5–10.5)
CHLORIDE SERPL-SCNC: 103 MMOL/L (ref 96–112)
CO2 SERPL-SCNC: 25 MMOL/L (ref 20–33)
CREAT SERPL-MCNC: 1.24 MG/DL (ref 0.5–1.4)
EOSINOPHIL # BLD AUTO: 0.78 K/UL (ref 0–0.51)
EOSINOPHIL NFR BLD: 8.7 % (ref 0–6.9)
ERYTHROCYTE [DISTWIDTH] IN BLOOD BY AUTOMATED COUNT: 49.6 FL (ref 35.9–50)
FERRITIN SERPL-MCNC: 49.8 NG/ML (ref 22–322)
GFR SERPLBLD CREATININE-BSD FMLA CKD-EPI: 63 ML/MIN/1.73 M 2
GLUCOSE BLD STRIP.AUTO-MCNC: 102 MG/DL (ref 65–99)
GLUCOSE BLD STRIP.AUTO-MCNC: 157 MG/DL (ref 65–99)
GLUCOSE BLD STRIP.AUTO-MCNC: 184 MG/DL (ref 65–99)
GLUCOSE BLD STRIP.AUTO-MCNC: 195 MG/DL (ref 65–99)
GLUCOSE SERPL-MCNC: 119 MG/DL (ref 65–99)
HCT VFR BLD AUTO: 30.6 % (ref 42–52)
HGB BLD-MCNC: 9.5 G/DL (ref 14–18)
IMM GRANULOCYTES # BLD AUTO: 0.03 K/UL (ref 0–0.11)
IMM GRANULOCYTES NFR BLD AUTO: 0.3 % (ref 0–0.9)
IRON SATN MFR SERPL: 11 % (ref 15–55)
IRON SERPL-MCNC: 30 UG/DL (ref 50–180)
LV EJECT FRACT  99904: 20
LV EJECT FRACT MOD 2C 99903: 22.29
LV EJECT FRACT MOD 4C 99902: 17.56
LV EJECT FRACT MOD BP 99901: 16.19
LYMPHOCYTES # BLD AUTO: 3.25 K/UL (ref 1–4.8)
LYMPHOCYTES NFR BLD: 36.4 % (ref 22–41)
MAGNESIUM SERPL-MCNC: 1.9 MG/DL (ref 1.5–2.5)
MCH RBC QN AUTO: 25.5 PG (ref 27–33)
MCHC RBC AUTO-ENTMCNC: 31 G/DL (ref 33.7–35.3)
MCV RBC AUTO: 82.3 FL (ref 81.4–97.8)
MONOCYTES # BLD AUTO: 0.73 K/UL (ref 0–0.85)
MONOCYTES NFR BLD AUTO: 8.2 % (ref 0–13.4)
NEUTROPHILS # BLD AUTO: 4.06 K/UL (ref 1.82–7.42)
NEUTROPHILS NFR BLD: 45.6 % (ref 44–72)
NRBC # BLD AUTO: 0 K/UL
NRBC BLD-RTO: 0 /100 WBC
PLATELET # BLD AUTO: 214 K/UL (ref 164–446)
PMV BLD AUTO: 10 FL (ref 9–12.9)
POTASSIUM SERPL-SCNC: 4 MMOL/L (ref 3.6–5.5)
PROCALCITONIN SERPL-MCNC: <0.05 NG/ML
RBC # BLD AUTO: 3.72 M/UL (ref 4.7–6.1)
SODIUM SERPL-SCNC: 137 MMOL/L (ref 135–145)
TIBC SERPL-MCNC: 278 UG/DL (ref 250–450)
UIBC SERPL-MCNC: 248 UG/DL (ref 110–370)
WBC # BLD AUTO: 8.9 K/UL (ref 4.8–10.8)

## 2022-04-03 PROCEDURE — 93306 TTE W/DOPPLER COMPLETE: CPT | Mod: 26 | Performed by: INTERNAL MEDICINE

## 2022-04-03 PROCEDURE — 80048 BASIC METABOLIC PNL TOTAL CA: CPT

## 2022-04-03 PROCEDURE — 700105 HCHG RX REV CODE 258: Performed by: STUDENT IN AN ORGANIZED HEALTH CARE EDUCATION/TRAINING PROGRAM

## 2022-04-03 PROCEDURE — A9270 NON-COVERED ITEM OR SERVICE: HCPCS | Performed by: HOSPITALIST

## 2022-04-03 PROCEDURE — 83550 IRON BINDING TEST: CPT

## 2022-04-03 PROCEDURE — 93306 TTE W/DOPPLER COMPLETE: CPT

## 2022-04-03 PROCEDURE — 36415 COLL VENOUS BLD VENIPUNCTURE: CPT

## 2022-04-03 PROCEDURE — 700102 HCHG RX REV CODE 250 W/ 637 OVERRIDE(OP): Performed by: HOSPITALIST

## 2022-04-03 PROCEDURE — 99233 SBSQ HOSP IP/OBS HIGH 50: CPT | Performed by: STUDENT IN AN ORGANIZED HEALTH CARE EDUCATION/TRAINING PROGRAM

## 2022-04-03 PROCEDURE — 700111 HCHG RX REV CODE 636 W/ 250 OVERRIDE (IP): Mod: JG | Performed by: STUDENT IN AN ORGANIZED HEALTH CARE EDUCATION/TRAINING PROGRAM

## 2022-04-03 PROCEDURE — 700111 HCHG RX REV CODE 636 W/ 250 OVERRIDE (IP): Performed by: HOSPITALIST

## 2022-04-03 PROCEDURE — 85025 COMPLETE CBC W/AUTO DIFF WBC: CPT

## 2022-04-03 PROCEDURE — 700102 HCHG RX REV CODE 250 W/ 637 OVERRIDE(OP): Performed by: STUDENT IN AN ORGANIZED HEALTH CARE EDUCATION/TRAINING PROGRAM

## 2022-04-03 PROCEDURE — 83540 ASSAY OF IRON: CPT

## 2022-04-03 PROCEDURE — 83735 ASSAY OF MAGNESIUM: CPT

## 2022-04-03 PROCEDURE — 82962 GLUCOSE BLOOD TEST: CPT

## 2022-04-03 PROCEDURE — 82728 ASSAY OF FERRITIN: CPT

## 2022-04-03 PROCEDURE — 84145 PROCALCITONIN (PCT): CPT

## 2022-04-03 PROCEDURE — 770020 HCHG ROOM/CARE - TELE (206)

## 2022-04-03 PROCEDURE — A9270 NON-COVERED ITEM OR SERVICE: HCPCS | Performed by: STUDENT IN AN ORGANIZED HEALTH CARE EDUCATION/TRAINING PROGRAM

## 2022-04-03 RX ORDER — METOLAZONE 5 MG/1
5 TABLET ORAL
Status: DISCONTINUED | OUTPATIENT
Start: 2022-04-03 | End: 2022-04-04

## 2022-04-03 RX ORDER — FUROSEMIDE 10 MG/ML
20 INJECTION INTRAMUSCULAR; INTRAVENOUS ONCE
Status: COMPLETED | OUTPATIENT
Start: 2022-04-03 | End: 2022-04-03

## 2022-04-03 RX ORDER — DIPHENHYDRAMINE HYDROCHLORIDE 50 MG/ML
25 INJECTION INTRAMUSCULAR; INTRAVENOUS ONCE
Status: COMPLETED | OUTPATIENT
Start: 2022-04-03 | End: 2022-04-03

## 2022-04-03 RX ORDER — ACETAMINOPHEN 325 MG/1
650 TABLET ORAL ONCE
Status: COMPLETED | OUTPATIENT
Start: 2022-04-03 | End: 2022-04-03

## 2022-04-03 RX ORDER — DIPHENHYDRAMINE HCL 25 MG
25 TABLET ORAL ONCE
Status: COMPLETED | OUTPATIENT
Start: 2022-04-03 | End: 2022-04-03

## 2022-04-03 RX ADMIN — INSULIN HUMAN 2 UNITS: 100 INJECTION, SOLUTION PARENTERAL at 14:05

## 2022-04-03 RX ADMIN — SIMVASTATIN 40 MG: 40 TABLET, FILM COATED ORAL at 17:20

## 2022-04-03 RX ADMIN — POTASSIUM CHLORIDE 20 MEQ: 20 TABLET, EXTENDED RELEASE ORAL at 07:25

## 2022-04-03 RX ADMIN — FUROSEMIDE 40 MG: 10 INJECTION, SOLUTION INTRAMUSCULAR; INTRAVENOUS at 16:45

## 2022-04-03 RX ADMIN — FUROSEMIDE 40 MG: 10 INJECTION, SOLUTION INTRAMUSCULAR; INTRAVENOUS at 07:25

## 2022-04-03 RX ADMIN — SODIUM CHLORIDE 1575 MG: 9 INJECTION, SOLUTION INTRAVENOUS at 19:20

## 2022-04-03 RX ADMIN — SENNOSIDES AND DOCUSATE SODIUM 2 TABLET: 50; 8.6 TABLET ORAL at 17:21

## 2022-04-03 RX ADMIN — METOLAZONE 5 MG: 5 TABLET ORAL at 11:00

## 2022-04-03 RX ADMIN — SODIUM CHLORIDE 25 MG: 9 INJECTION, SOLUTION INTRAVENOUS at 17:17

## 2022-04-03 RX ADMIN — LOSARTAN POTASSIUM 25 MG: 25 TABLET, FILM COATED ORAL at 07:25

## 2022-04-03 RX ADMIN — DIPHENHYDRAMINE HYDROCHLORIDE 25 MG: 25 TABLET ORAL at 17:01

## 2022-04-03 RX ADMIN — INSULIN HUMAN 2 UNITS: 100 INJECTION, SOLUTION PARENTERAL at 20:50

## 2022-04-03 RX ADMIN — ACETAMINOPHEN 650 MG: 325 TABLET, FILM COATED ORAL at 17:01

## 2022-04-03 RX ADMIN — INSULIN HUMAN 2 UNITS: 100 INJECTION, SOLUTION PARENTERAL at 17:59

## 2022-04-03 RX ADMIN — FUROSEMIDE 20 MG: 10 INJECTION, SOLUTION INTRAMUSCULAR; INTRAVENOUS at 11:34

## 2022-04-03 RX ADMIN — RIVAROXABAN 10 MG: 10 TABLET, FILM COATED ORAL at 07:25

## 2022-04-03 RX ADMIN — INSULIN GLARGINE 15 UNITS: 100 INJECTION, SOLUTION SUBCUTANEOUS at 17:58

## 2022-04-03 RX ADMIN — Medication 2000 UNITS: at 06:00

## 2022-04-03 ASSESSMENT — ENCOUNTER SYMPTOMS
EYE DISCHARGE: 0
PALPITATIONS: 0
MYALGIAS: 0
SHORTNESS OF BREATH: 1
FLANK PAIN: 0
DIAPHORESIS: 0
NECK PAIN: 0
EYE PAIN: 0
DIARRHEA: 0
NERVOUS/ANXIOUS: 0
MEMORY LOSS: 0
VOMITING: 0
BLOOD IN STOOL: 0
SINUS PAIN: 0
WEIGHT LOSS: 0
SPUTUM PRODUCTION: 0
BACK PAIN: 0
SORE THROAT: 0
WHEEZING: 0
SEIZURES: 0
COUGH: 0
DOUBLE VISION: 0
WEAKNESS: 0
NAUSEA: 0
TINGLING: 0
INSOMNIA: 0
POLYDIPSIA: 0
CHILLS: 0
BRUISES/BLEEDS EASILY: 0
STRIDOR: 0
HEADACHES: 0
EYE REDNESS: 0
DIZZINESS: 0
FEVER: 0

## 2022-04-03 ASSESSMENT — FIBROSIS 4 INDEX: FIB4 SCORE: 1.71

## 2022-04-03 ASSESSMENT — PAIN DESCRIPTION - PAIN TYPE
TYPE: ACUTE PAIN
TYPE: ACUTE PAIN

## 2022-04-03 NOTE — PROGRESS NOTES
IV Iron Per Pharmacy Note    Patient Lean Body Weight:  61.5 kg  Reason for Iron Replacement: Iron deficiency anemia      Lab Results   Component Value Date/Time    WBC 8.9 04/03/2022 01:22 AM    RBC 3.72 (L) 04/03/2022 01:22 AM    HEMOGLOBIN 9.5 (L) 04/03/2022 01:22 AM    HEMATOCRIT 30.6 (L) 04/03/2022 01:22 AM    MCV 82.3 04/03/2022 01:22 AM    MCH 25.5 (L) 04/03/2022 01:22 AM    MCHC 31.0 (L) 04/03/2022 01:22 AM    MPV 10.0 04/03/2022 01:22 AM       Recent Labs     04/03/22  0122   FERRITIN 49.8   IRON 30*         Recent Labs     04/03/22  0122   CREATININE 1.24          Assessment/Plan:  1. IV Iron Indicated.   2. Give Iron Dextran 25 mg IV test dose following diphenhydramine/acetaminophen premeds over 30 minutes per protocol.  3. If no reaction (Anaphylaxis, Hypotension/Hypertension, N/V/D, Chest pain/Back Pain, Urticaria/Pruritis) in the next hour, proceed to full dose.   4. Full dose: Iron Dextran 1,575 mg IV over 4 hours. Continue to monitor for delayed ADR including: Arthralgia/myalgia, Headache/backache, chills/dizziness/malaise, moderate to high fever and n/v.      Eulalia Snider, PharmD  PGY1 Pharmacy Practice Resident

## 2022-04-03 NOTE — CARE PLAN
Problem: Respiratory  Goal: Patient will achieve/maintain optimum respiratory ventilation and gas exchange  Outcome: Not Progressing  Note: Pt still states that he feels SOB of breath, tachypnic      Problem: Pain - Standard  Goal: Alleviation of pain or a reduction in pain to the patient’s comfort goal  Outcome: Progressing  Note: Pt reporting better pain control      Problem: Fall Risk  Goal: Patient will remain free from falls  Outcome: Progressing     Problem: Fluid Volume  Goal: Fluid volume balance will be maintained  Outcome: Progressing   The patient is Stable - Low risk of patient condition declining or worsening    Shift Goals  Clinical Goals: Monitor respiratory status, decrease swelling,  Patient Goals: decrease swelling  Family Goals: rest, comfort    Progress made toward(s) clinical / shift goals:  see notes     Patient is not progressing towards the following goals:      Problem: Respiratory  Goal: Patient will achieve/maintain optimum respiratory ventilation and gas exchange  Outcome: Not Progressing  Note: Pt still states that he feels SOB of breath, tachypnic

## 2022-04-03 NOTE — HOSPITAL COURSE
Chucky Keith is a 66 y.o. male who presented 4/2/2022 with generalized edema and right scrotal swelling.  He reports that over the past month he has noted increasing swelling and over the past few days he has noted progressive dyspnea on exertion associated with orthopnea.  He denies any fever or chills.  He became concerned about the increasing scrotal swelling that he presented to the emergency department.  He has a history of diabetes dyslipidemia and hypertension.  He denies any known history of cardiac disease.  No trauma.  No fever no chills.  His diabetes has been controlled on Lantus.

## 2022-04-03 NOTE — PROGRESS NOTES
Received bedside report from RN, pt care assumed, VSS, pt assessment complete. Pt AAOx4, with no c/o of pain at this time. No signs of acute distress noted at this time. Plan of care discussed with pt and verbalizes no questions. Pt denies any additional needs at this time. Bed locked/in lowest position, bed alarm on, pt educated on fall risk and verbalized understanding, call light within reach, hourly rounding initiated.        services used at this time.   [place]   [id number]

## 2022-04-03 NOTE — PROGRESS NOTES
Hospital Medicine Daily Progress Note    Date of Service  4/3/2022    Chief Complaint  Chucky Keith is a 66 y.o. male admitted 4/2/2022 with shortness of breath    Hospital Course  Chucky Keith is a 66 y.o. male who presented 4/2/2022 with generalized edema and right scrotal swelling.  He reports that over the past month he has noted increasing swelling and over the past few days he has noted progressive dyspnea on exertion associated with orthopnea.  He denies any fever or chills.  He became concerned about the increasing scrotal swelling that he presented to the emergency department.  He has a history of diabetes dyslipidemia and hypertension.  He denies any known history of cardiac disease.  No trauma.  No fever no chills.  His diabetes has been controlled on Lantus.      Interval Problem Update  4/3/2022:  Patient continuing with aggressive diuresis patient still has approximately 1-2+ pitting edema up to the mid lower leg bilaterally.  No acute events overnight patient endorses improvement of breathing.  Offers no other concerns at this time.  Plan is to continue with aggressive diuresis when appropriate transition to beta-blocker upon reaching euvolemia continue with losartan echocardiogram noted patient also require spironolactone ejection fraction of approximately 20%.    I have personally seen and examined the patient at bedside. I discussed the plan of care with patient and bedside RN.    Consultants/Specialty  cardiology    Code Status  Full Code    Disposition  Patient is not medically cleared for discharge.   Anticipate discharge to to home with close outpatient follow-up.  I have placed the appropriate orders for post-discharge needs.    Review of Systems  Review of Systems   Constitutional: Negative for chills, diaphoresis, fever, malaise/fatigue and weight loss.   HENT: Negative for congestion, nosebleeds, sinus pain and sore throat.    Eyes: Negative for double vision, pain, discharge and redness.    Respiratory: Positive for shortness of breath. Negative for cough, sputum production, wheezing and stridor.    Cardiovascular: Positive for leg swelling. Negative for chest pain and palpitations.   Gastrointestinal: Negative for blood in stool, diarrhea, nausea and vomiting.   Genitourinary: Negative for flank pain, frequency and urgency.   Musculoskeletal: Negative for back pain, joint pain, myalgias and neck pain.   Skin: Negative for itching and rash.   Neurological: Negative for dizziness, tingling, seizures, weakness and headaches.   Endo/Heme/Allergies: Negative for polydipsia. Does not bruise/bleed easily.   Psychiatric/Behavioral: Negative for memory loss. The patient is not nervous/anxious and does not have insomnia.         Physical Exam  Temp:  [35.9 °C (96.7 °F)-36.8 °C (98.3 °F)] 36.8 °C (98.2 °F)  Pulse:  [] 98  Resp:  [20-32] 20  BP: (132-144)/(82-89) 135/82  SpO2:  [92 %-100 %] 100 %    Physical Exam  Vitals and nursing note reviewed.   Constitutional:       General: He is not in acute distress.     Appearance: Normal appearance. He is well-developed. He is obese. He is not ill-appearing or diaphoretic.   HENT:      Head: Normocephalic and atraumatic.      Mouth/Throat:      Pharynx: No oropharyngeal exudate.   Eyes:      General: No scleral icterus.        Right eye: No discharge.         Left eye: No discharge.      Conjunctiva/sclera: Conjunctivae normal.      Pupils: Pupils are equal, round, and reactive to light.   Neck:      Vascular: No JVD.      Trachea: No tracheal deviation.   Cardiovascular:      Rate and Rhythm: Regular rhythm. Tachycardia present.      Heart sounds: No murmur heard.    No friction rub. No gallop.   Pulmonary:      Effort: Pulmonary effort is normal. No respiratory distress.      Breath sounds: No stridor. Rales present. No wheezing.   Chest:      Chest wall: No tenderness.   Abdominal:      General: Bowel sounds are normal. There is no distension.       Palpations: Abdomen is soft.      Tenderness: There is no abdominal tenderness. There is no rebound.      Hernia: A hernia (Right inguinal) is present.   Musculoskeletal:         General: Swelling present. No tenderness.      Cervical back: Neck supple.      Right lower leg: Edema present.      Left lower leg: Edema present.   Skin:     General: Skin is warm and dry.      Nails: There is no clubbing.   Neurological:      Mental Status: He is alert and oriented to person, place, and time.      Cranial Nerves: No cranial nerve deficit.      Motor: No abnormal muscle tone.   Psychiatric:         Behavior: Behavior normal.         Fluids    Intake/Output Summary (Last 24 hours) at 4/3/2022 1544  Last data filed at 4/3/2022 1400  Gross per 24 hour   Intake 600 ml   Output 1500 ml   Net -900 ml       Laboratory  Recent Labs     04/02/22  1131 04/03/22  0122   WBC 7.8 8.9   RBC 3.91* 3.72*   HEMOGLOBIN 9.9* 9.5*   HEMATOCRIT 31.8* 30.6*   MCV 81.3* 82.3   MCH 25.3* 25.5*   MCHC 31.1* 31.0*   RDW 49.5 49.6   PLATELETCT 211 214   MPV 9.5 10.0     Recent Labs     04/02/22  1131 04/03/22  0122   SODIUM 136 137   POTASSIUM 4.4 4.0   CHLORIDE 103 103   CO2 22 25   GLUCOSE 169* 119*   BUN 19 19   CREATININE 1.13 1.24   CALCIUM 9.1 9.2                   Imaging  EC-ECHOCARDIOGRAM COMPLETE W/O CONT   Final Result      US-EXTREMITY VENOUS LOWER UNILAT RIGHT   Final Result      CT-ABDOMEN-PELVIS WITH   Final Result      1.  Diffuse anasarca. Small pleural effusions, mild ascites and body wall and scrotal edema.   2.  Large right inguinal hernia containing a loop of colon. No bowel obstruction.   3.  No definite evidence metastatic disease.   4.  Cardiomegaly.   5.  Nonspecific right iliacus fluid collection measuring 4.1 x 2.9 cm.      DX-CHEST-PORTABLE (1 VIEW)   Final Result      Cardiomegaly with mild interstitial edema consistent with CHF. Possible trace pleural effusions.           Assessment/Plan  * Anasarca- (present on  admission)  Assessment & Plan  Suspect acute CHF    Patient will be admitted and started on IV Lasix  We will check echocardiogram  Continue losartan further work-up depending on echocardiogram results      Elevated troponin  Assessment & Plan  Likely demand ischemia  Recheck troponin follow-up on echocardiogram    Tobacco dependence  Assessment & Plan  Counseled on cessation    Pelvic fluid collection  Assessment & Plan  Reviewed and discussed CT findings with Dr. Pavon from interventional radiology   Dr Pavon has low threshold to drain fluid collection if any signs of infection, patient currently has no clinical signs(afebrile with no leukocytosis) and he would benefit from further stabilization of his anasarca and suspected CHF prior to any invasive procedures  Dr. Pavon recommended ensuring repeat CT in 1 week to follow-up     Right inguinal hernia  Assessment & Plan  Will need surgery evaluation for elective repair    Leg edema  Assessment & Plan  Follow-up on pending venous duplex    Congestive heart failure of unknown etiology (HCC)- (present on admission)  Assessment & Plan  Start diuresis and check echocardiogram    Anemia- (present on admission)  Assessment & Plan  Chronic anemia  We will check iron panel  Monitor H&H  Patient's PCP notes reviewed he has already initiated GI referral    Essential hypertension- (present on admission)  Assessment & Plan  Continue losartan  Monitor blood pressure    Diabetic polyneuropathy associated with type 2 diabetes mellitus (HCC)- (present on admission)  Assessment & Plan  Continue Lantus  Insulin sliding scale monitor CBGs  Most recent hemoglobin A1c 7.0    Dyslipidemia- (present on admission)  Assessment & Plan  Continue simvastatin       VTE prophylaxis: SCDs/TEDs, therapeutic anticoagulation with Xarelto and pharmacologic prophylaxis contraindicated due to Not applicable    I have performed a physical exam and reviewed and updated ROS and Plan today (4/3/2022).  In review of yesterday's note (4/2/2022), there are no changes except as documented above.

## 2022-04-04 ENCOUNTER — APPOINTMENT (OUTPATIENT)
Dept: CARDIOLOGY | Facility: MEDICAL CENTER | Age: 67
DRG: 246 | End: 2022-04-04
Attending: INTERNAL MEDICINE
Payer: MEDICARE

## 2022-04-04 PROBLEM — I50.21 ACUTE SYSTOLIC HEART FAILURE (HCC): Status: ACTIVE | Noted: 2022-04-04

## 2022-04-04 LAB
ANION GAP SERPL CALC-SCNC: 11 MMOL/L (ref 7–16)
BASOPHILS # BLD AUTO: 0.8 % (ref 0–1.8)
BASOPHILS # BLD: 0.07 K/UL (ref 0–0.12)
BUN SERPL-MCNC: 25 MG/DL (ref 8–22)
CALCIUM SERPL-MCNC: 9.7 MG/DL (ref 8.5–10.5)
CHLORIDE SERPL-SCNC: 95 MMOL/L (ref 96–112)
CO2 SERPL-SCNC: 28 MMOL/L (ref 20–33)
CREAT SERPL-MCNC: 1.49 MG/DL (ref 0.5–1.4)
EOSINOPHIL # BLD AUTO: 0.94 K/UL (ref 0–0.51)
EOSINOPHIL NFR BLD: 10.3 % (ref 0–6.9)
ERYTHROCYTE [DISTWIDTH] IN BLOOD BY AUTOMATED COUNT: 47.7 FL (ref 35.9–50)
GFR SERPLBLD CREATININE-BSD FMLA CKD-EPI: 51 ML/MIN/1.73 M 2
GLUCOSE BLD STRIP.AUTO-MCNC: 108 MG/DL (ref 65–99)
GLUCOSE BLD STRIP.AUTO-MCNC: 112 MG/DL (ref 65–99)
GLUCOSE BLD STRIP.AUTO-MCNC: 162 MG/DL (ref 65–99)
GLUCOSE BLD STRIP.AUTO-MCNC: 99 MG/DL (ref 65–99)
GLUCOSE SERPL-MCNC: 89 MG/DL (ref 65–99)
HCT VFR BLD AUTO: 29 % (ref 42–52)
HGB BLD-MCNC: 9.3 G/DL (ref 14–18)
IMM GRANULOCYTES # BLD AUTO: 0.01 K/UL (ref 0–0.11)
IMM GRANULOCYTES NFR BLD AUTO: 0.1 % (ref 0–0.9)
LYMPHOCYTES # BLD AUTO: 3.35 K/UL (ref 1–4.8)
LYMPHOCYTES NFR BLD: 36.7 % (ref 22–41)
MAGNESIUM SERPL-MCNC: 1.9 MG/DL (ref 1.5–2.5)
MCH RBC QN AUTO: 25.5 PG (ref 27–33)
MCHC RBC AUTO-ENTMCNC: 32.1 G/DL (ref 33.7–35.3)
MCV RBC AUTO: 79.7 FL (ref 81.4–97.8)
MONOCYTES # BLD AUTO: 0.68 K/UL (ref 0–0.85)
MONOCYTES NFR BLD AUTO: 7.4 % (ref 0–13.4)
NEUTROPHILS # BLD AUTO: 4.08 K/UL (ref 1.82–7.42)
NEUTROPHILS NFR BLD: 44.7 % (ref 44–72)
NRBC # BLD AUTO: 0 K/UL
NRBC BLD-RTO: 0 /100 WBC
PLATELET # BLD AUTO: 206 K/UL (ref 164–446)
PMV BLD AUTO: 10.1 FL (ref 9–12.9)
POTASSIUM SERPL-SCNC: 3.9 MMOL/L (ref 3.6–5.5)
RBC # BLD AUTO: 3.64 M/UL (ref 4.7–6.1)
SODIUM SERPL-SCNC: 134 MMOL/L (ref 135–145)
WBC # BLD AUTO: 9.1 K/UL (ref 4.8–10.8)

## 2022-04-04 PROCEDURE — 700111 HCHG RX REV CODE 636 W/ 250 OVERRIDE (IP): Performed by: HOSPITALIST

## 2022-04-04 PROCEDURE — 700102 HCHG RX REV CODE 250 W/ 637 OVERRIDE(OP): Performed by: STUDENT IN AN ORGANIZED HEALTH CARE EDUCATION/TRAINING PROGRAM

## 2022-04-04 PROCEDURE — 93458 L HRT ARTERY/VENTRICLE ANGIO: CPT

## 2022-04-04 PROCEDURE — B2111ZZ FLUOROSCOPY OF MULTIPLE CORONARY ARTERIES USING LOW OSMOLAR CONTRAST: ICD-10-PCS | Performed by: INTERNAL MEDICINE

## 2022-04-04 PROCEDURE — 36415 COLL VENOUS BLD VENIPUNCTURE: CPT

## 2022-04-04 PROCEDURE — 700111 HCHG RX REV CODE 636 W/ 250 OVERRIDE (IP)

## 2022-04-04 PROCEDURE — 700102 HCHG RX REV CODE 250 W/ 637 OVERRIDE(OP)

## 2022-04-04 PROCEDURE — 99223 1ST HOSP IP/OBS HIGH 75: CPT | Performed by: INTERNAL MEDICINE

## 2022-04-04 PROCEDURE — 85025 COMPLETE CBC W/AUTO DIFF WBC: CPT

## 2022-04-04 PROCEDURE — 700117 HCHG RX CONTRAST REV CODE 255: Performed by: INTERNAL MEDICINE

## 2022-04-04 PROCEDURE — 93458 L HRT ARTERY/VENTRICLE ANGIO: CPT | Mod: 26 | Performed by: INTERNAL MEDICINE

## 2022-04-04 PROCEDURE — 83735 ASSAY OF MAGNESIUM: CPT

## 2022-04-04 PROCEDURE — A9270 NON-COVERED ITEM OR SERVICE: HCPCS

## 2022-04-04 PROCEDURE — 99152 MOD SED SAME PHYS/QHP 5/>YRS: CPT | Performed by: INTERNAL MEDICINE

## 2022-04-04 PROCEDURE — 700101 HCHG RX REV CODE 250

## 2022-04-04 PROCEDURE — A9270 NON-COVERED ITEM OR SERVICE: HCPCS | Performed by: HOSPITALIST

## 2022-04-04 PROCEDURE — A9270 NON-COVERED ITEM OR SERVICE: HCPCS | Performed by: STUDENT IN AN ORGANIZED HEALTH CARE EDUCATION/TRAINING PROGRAM

## 2022-04-04 PROCEDURE — 80048 BASIC METABOLIC PNL TOTAL CA: CPT

## 2022-04-04 PROCEDURE — 770020 HCHG ROOM/CARE - TELE (206)

## 2022-04-04 PROCEDURE — 82962 GLUCOSE BLOOD TEST: CPT | Mod: 91

## 2022-04-04 PROCEDURE — 700102 HCHG RX REV CODE 250 W/ 637 OVERRIDE(OP): Performed by: HOSPITALIST

## 2022-04-04 PROCEDURE — 99233 SBSQ HOSP IP/OBS HIGH 50: CPT | Performed by: STUDENT IN AN ORGANIZED HEALTH CARE EDUCATION/TRAINING PROGRAM

## 2022-04-04 PROCEDURE — 4A023N7 MEASUREMENT OF CARDIAC SAMPLING AND PRESSURE, LEFT HEART, PERCUTANEOUS APPROACH: ICD-10-PCS | Performed by: INTERNAL MEDICINE

## 2022-04-04 RX ORDER — MIDAZOLAM HYDROCHLORIDE 1 MG/ML
INJECTION INTRAMUSCULAR; INTRAVENOUS
Status: COMPLETED
Start: 2022-04-04 | End: 2022-04-04

## 2022-04-04 RX ORDER — VERAPAMIL HYDROCHLORIDE 2.5 MG/ML
INJECTION, SOLUTION INTRAVENOUS
Status: COMPLETED
Start: 2022-04-04 | End: 2022-04-04

## 2022-04-04 RX ORDER — ROSUVASTATIN CALCIUM 20 MG/1
40 TABLET, COATED ORAL EVERY EVENING
Status: DISCONTINUED | OUTPATIENT
Start: 2022-04-04 | End: 2022-04-08 | Stop reason: HOSPADM

## 2022-04-04 RX ORDER — HEPARIN SODIUM 200 [USP'U]/100ML
INJECTION, SOLUTION INTRAVENOUS
Status: COMPLETED
Start: 2022-04-04 | End: 2022-04-04

## 2022-04-04 RX ORDER — ASPIRIN 81 MG/1
TABLET, CHEWABLE ORAL
Status: COMPLETED
Start: 2022-04-04 | End: 2022-04-04

## 2022-04-04 RX ORDER — LIDOCAINE HYDROCHLORIDE 20 MG/ML
INJECTION, SOLUTION INFILTRATION; PERINEURAL
Status: COMPLETED
Start: 2022-04-04 | End: 2022-04-04

## 2022-04-04 RX ORDER — HEPARIN SODIUM 1000 [USP'U]/ML
INJECTION, SOLUTION INTRAVENOUS; SUBCUTANEOUS
Status: COMPLETED
Start: 2022-04-04 | End: 2022-04-04

## 2022-04-04 RX ADMIN — LIDOCAINE HYDROCHLORIDE: 20 INJECTION, SOLUTION INFILTRATION; PERINEURAL at 15:42

## 2022-04-04 RX ADMIN — RIVAROXABAN 10 MG: 10 TABLET, FILM COATED ORAL at 07:34

## 2022-04-04 RX ADMIN — NITROGLYCERIN: 20 INJECTION INTRAVENOUS at 15:00

## 2022-04-04 RX ADMIN — IOHEXOL 15 ML: 350 INJECTION, SOLUTION INTRAVENOUS at 15:51

## 2022-04-04 RX ADMIN — INSULIN HUMAN 2 UNITS: 100 INJECTION, SOLUTION PARENTERAL at 20:53

## 2022-04-04 RX ADMIN — METOLAZONE 5 MG: 5 TABLET ORAL at 07:34

## 2022-04-04 RX ADMIN — HEPARIN SODIUM: 1000 INJECTION, SOLUTION INTRAVENOUS; SUBCUTANEOUS at 15:42

## 2022-04-04 RX ADMIN — ASPIRIN 81 MG 324 MG: 81 TABLET ORAL at 15:41

## 2022-04-04 RX ADMIN — POTASSIUM CHLORIDE 20 MEQ: 20 TABLET, EXTENDED RELEASE ORAL at 07:34

## 2022-04-04 RX ADMIN — VERAPAMIL HYDROCHLORIDE 2.5 MG: 2.5 INJECTION, SOLUTION INTRAVENOUS at 15:42

## 2022-04-04 RX ADMIN — SENNOSIDES AND DOCUSATE SODIUM 2 TABLET: 50; 8.6 TABLET ORAL at 07:34

## 2022-04-04 RX ADMIN — SENNOSIDES AND DOCUSATE SODIUM 2 TABLET: 50; 8.6 TABLET ORAL at 17:25

## 2022-04-04 RX ADMIN — Medication 2000 UNITS: at 07:34

## 2022-04-04 RX ADMIN — FENTANYL CITRATE 25 MCG: 50 INJECTION, SOLUTION INTRAMUSCULAR; INTRAVENOUS at 15:51

## 2022-04-04 RX ADMIN — FUROSEMIDE 40 MG: 10 INJECTION, SOLUTION INTRAMUSCULAR; INTRAVENOUS at 07:35

## 2022-04-04 RX ADMIN — LOSARTAN POTASSIUM 25 MG: 25 TABLET, FILM COATED ORAL at 07:34

## 2022-04-04 RX ADMIN — HEPARIN SODIUM 2000 UNITS: 200 INJECTION, SOLUTION INTRAVENOUS at 15:42

## 2022-04-04 RX ADMIN — INSULIN GLARGINE 15 UNITS: 100 INJECTION, SOLUTION SUBCUTANEOUS at 17:24

## 2022-04-04 RX ADMIN — MIDAZOLAM HYDROCHLORIDE 1 MG: 1 INJECTION, SOLUTION INTRAMUSCULAR; INTRAVENOUS at 15:51

## 2022-04-04 RX ADMIN — ROSUVASTATIN CALCIUM 40 MG: 20 TABLET, FILM COATED ORAL at 17:25

## 2022-04-04 ASSESSMENT — ENCOUNTER SYMPTOMS
SINUS PAIN: 0
NAUSEA: 0
FLANK PAIN: 0
SEIZURES: 0
HEADACHES: 0
PALPITATIONS: 0
NERVOUS/ANXIOUS: 0
DIZZINESS: 0
INSOMNIA: 0
SORE THROAT: 0
SHORTNESS OF BREATH: 1
DIAPHORESIS: 0
NECK PAIN: 0
EYE DISCHARGE: 0
DIARRHEA: 0
BACK PAIN: 0
TINGLING: 0
FEVER: 0
STRIDOR: 0
VOMITING: 0
CHILLS: 0
MYALGIAS: 0
SPUTUM PRODUCTION: 0
BRUISES/BLEEDS EASILY: 0
COUGH: 0
DOUBLE VISION: 0
WEAKNESS: 0
WHEEZING: 0
EYE PAIN: 0
WEIGHT LOSS: 0
EYE REDNESS: 0
POLYDIPSIA: 0
BLOOD IN STOOL: 0
MEMORY LOSS: 0

## 2022-04-04 ASSESSMENT — PAIN DESCRIPTION - PAIN TYPE: TYPE: ACUTE PAIN

## 2022-04-04 NOTE — PROGRESS NOTES
Received bedside report from RN, pt care assumed, VSS, pt assessment complete. Pt AAOx4, with no c/o of pain at this time. No signs of acute distress noted at this time. Plan of care discussed with pt and verbalizes no questions. Pt denies any additional needs at this time. Bed locked/in lowest position, bed alarm on, pt educated on fall risk and verbalized understanding, call light within reach, hourly rounding initiated.     Language line services used to translate Cantonese

## 2022-04-04 NOTE — CARE PLAN
The patient is Stable - Low risk of patient condition declining or worsening    Shift Goals  Clinical Goals: diurese. heart cath  Patient Goals: rest, get more fluid off  Family Goals: lona    Progress made toward(s) clinical / shift goals:    Problem: Pain - Standard  Goal: Alleviation of pain or a reduction in pain to the patient’s comfort goal  Outcome: Progressing     Problem: Knowledge Deficit - Standard  Goal: Patient and family/care givers will demonstrate understanding of plan of care, disease process/condition, diagnostic tests and medications  Outcome: Progressing     Problem: Skin Integrity  Goal: Skin integrity is maintained or improved  Outcome: Progressing       Patient is not progressing towards the following goals:

## 2022-04-04 NOTE — PROCEDURES
Cardiac Catheterization report    4/4/2022  3:56 PM    Referring MD:     Primary Care Provider: Mitesh Sorto M.D.    Indication for procedure: Congestive heart failure systolic    Procedures performed:  ·  Coronary arteriograms  · Left heart catheterization     Final impression:  Two vessel CAD    Recommendations:  Consider PCI of LAD, when stable    Findings:  1.  Left main coronary artery:  Normal.  2.  Left anterior descending artery: Mid LAD has 80% calcific stenosis after first diagonal takeoff.  First diagonal has luminal irregularities.  Second diagonal is smaller branch, has 70% stenosis in proximal portion  3.  Left circumflex coronary artery: Proximal, midportion, large first marginal free of significant disease.  Distal AV groove branch has moderate stenosis.  4.  Right coronary artery: 60-70% stenosis in proximal portion.  Diffuse 60% stenosis in midportion.  Right posterolateral branch is diffusely diseased with a focal 70% stenosis in the midportion .  This is a right dominant system.  5.  Left ventricular end diastolic pressure:  19 mmHg.  No signficant gradient across the aortic valve.      EBL: <10 CC    Specimens: None    Procedure details:  The risks and benefits of cardiac catheterization and possible intervention were explained to the patient including death, heart attack, stroke, and emergency surgery.  The patient verbalized understanding and wished to proceed.  The patient was brought to the cardiac catheterization laboratory in the fasting state and prepped and draped in the usual sterile fashion.  Timeout was performed.  The right wrist was locally anesthetized with lidocaine and the right radial artery was cannulated with 5/6-[place] equipment and standard radial cocktail was given.  Coronary angiography was performed using JR 4 and JL 3.5  diagnostic catheters in the usual fashion, results mentioned below.  JR 4 catheter was used to cross the aortic valve to perform  left heart  catheterization.    Once all the views were obtained, all wires and catheters were removed from the patient without difficulty.  A Vasc-Band was placed over the right radial artery and the radial artery sheath was removed without difficulty.      Complications:  None    Contrast: 20cc    Sedation time:  I supervised moderate sedation over a trained independent nursing staff,  Sedation Start time:  15:35   Sedation Stop time: 15:52      DEBBI Ruffin  [facility]

## 2022-04-04 NOTE — CARE PLAN
The patient is Watcher - Medium risk of patient condition declining or worsening    A*Ox4. VSS. 3-4L NC. Increase RR, SOB at rest and on exertion. X1 extra dose of lasix. Generalized edema, +1 edema in arms, +2 edema in legs, +3 edema in scrotum. Pain controlled. Pt was explained the importance of calling staff before getting up w/ . Pt was explained importance of measuring urine output. Pt resting comfortably.     Shift Goals  Clinical Goals: decreased respiratory effort  Patient Goals: decreased swelling  Family Goals: rest    Progress made toward(s) clinical / shift goals:  decreased respiratory efforts    Patient is not progressing towards the following goals:

## 2022-04-04 NOTE — ASSESSMENT & PLAN NOTE
Patient with newly diagnosed heart failure with reduced ejection fraction approximately 20%  Plan:  1.  Aggressive diuresis to reach euvolemic state  2.  Ischemic work-up in the form of cardiac catheterization patient has significant risk factors the form of insulin-dependent diabetes.  3.  ACE inhibitor/ARB or Arni as appropriate beta-blocker when euvolemic high intensity statin spironolactone.    Had cardiac cath on 4/4 and will go for PCI today now that GI bleed has been r/o

## 2022-04-04 NOTE — CONSULTS
Reason for Consult:  Asked by Dr Dennis Arevalo M.D. to see this patient with newly diagnosed  congestive heart failure with severely reduced ejection fraction  Patient's PCP: Mitesh Sorto M.D.    CC:   Chief Complaint   Patient presents with   • Shortness of Breath     X few days. Noted breathing slightly labored.    • Testicle Swelling     X 3 days   • Scrotal Pain     X 3 days. Rates pain as8/10   • Leg Swelling     In left leg x 20 days       HPI: This is a 66-year-old gentleman with a history of diabetes hypertension dyslipidemia he is Cantonese speaking and so far history and physical were done with a Cantonese  via video phone.  He reported  leg swelling for the last 10 days on and off scrotal swelling for the last 3 days or so noticed some shortness of breath as well.  He does smoke cigarettes but no alcohol or prior drug use no family history of heart disease that he is aware of.  His swelling and abdominal distention have been improved with diuresis here in the hospital he had echocardiogram yesterday that showed severely reduced ventricular function with biventricular failure and pulmonary hypertension.  No recent illness otherwise.  He was vaccinated for Covid and follows regularly with Dr. Sorto as an outpatient.  He was noted to have anemia which is quite microcytic he reports intermittent black stools obvious blood.  He has received iron transfusion here and has been on Xarelto 10 mg daily for DVT prophylaxis    Medications / Drug list prior to admission:  No current facility-administered medications on file prior to encounter.     Current Outpatient Medications on File Prior to Encounter   Medication Sig Dispense Refill   • amoxicillin-clavulanate (AUGMENTIN) 875-125 MG Tab Take 1 Tablet by mouth 2 times a day.     • insulin glargine (LANTUS SOLOSTAR) 100 UNIT/ML Solution Pen-injector injection INJECT 30 UNITS SUBCUTANEOUSLY EVERY NIGHT AT BEDTIME (Patient taking differently:  Inject 15 Units under the skin every evening. INJECT 30 UNITS SUBCUTANEOUSLY EVERY NIGHT AT BEDTIME) 5 Each 6   • meloxicam (MOBIC) 15 MG tablet Take 1 Tablet by mouth 1 time a day as needed. (Patient taking differently: Take 15 mg by mouth every day.) 30 Tablet 3   • metFORMIN (GLUCOPHAGE) 500 MG Tab Take 1 Tablet by mouth 2 times a day with meals. 60 Tablet 6   • benzonatate (TESSALON) 100 MG Cap Take 1 Capsule by mouth 3 times a day as needed for Cough. 60 Capsule 0   • simvastatin (ZOCOR) 40 MG Tab Take 1 Tablet by mouth every evening. 90 Tablet 3   • losartan (COZAAR) 25 MG Tab Take 1 Tablet by mouth every day. 30 Tablet 6   • vitamin D (CHOLECALCIFEROL) 1000 UNIT Tab Take 2 Tabs by mouth every day. 30 Tab 3       Current list of administered Medications:    Current Facility-Administered Medications:   •  metOLazone (ZAROXOLYN) tablet 5 mg, 5 mg, Oral, Q DAY, Dennis Arevalo M.D., 5 mg at 04/04/22 0734  •  benzonatate (TESSALON) capsule 100 mg, 100 mg, Oral, TID PRN, Kenneth Brady M.D.  •  losartan (COZAAR) tablet 25 mg, 25 mg, Oral, DAILY, Kenneth Brady M.D., 25 mg at 04/04/22 0734  •  simvastatin (ZOCOR) tablet 40 mg, 40 mg, Oral, Q EVENING, Kenneth Brady M.D., 40 mg at 04/03/22 1720  •  vitamin D3 (cholecalciferol) tablet 2,000 Units, 2,000 Units, Oral, DAILY, Kenneth Brady M.D., 2,000 Units at 04/04/22 0734  •  senna-docusate (PERICOLACE or SENOKOT S) 8.6-50 MG per tablet 2 Tablet, 2 Tablet, Oral, BID, 2 Tablet at 04/04/22 0734 **AND** polyethylene glycol/lytes (MIRALAX) PACKET 1 Packet, 1 Packet, Oral, QDAY PRN **AND** magnesium hydroxide (MILK OF MAGNESIA) suspension 30 mL, 30 mL, Oral, QDAY PRN **AND** bisacodyl (DULCOLAX) suppository 10 mg, 10 mg, Rectal, QDAY PRN, Kenneth Brady M.D.  •  rivaroxaban (XARELTO) tablet 10 mg, 10 mg, Oral, DAILY, Kenneth Brady M.D., 10 mg at 04/04/22 0734  •  acetaminophen (Tylenol) tablet 650 mg, 650 mg, Oral, Q6HRS PRN,  Kenneth Brady M.D.  •  hydrALAZINE (APRESOLINE) injection 10 mg, 10 mg, Intravenous, Q4HRS PRN, Kenneth Brady M.D.  •  ondansetron (ZOFRAN) syringe/vial injection 4 mg, 4 mg, Intravenous, Q4HRS PRN, Kenneth Brady M.D.  •  ondansetron (ZOFRAN ODT) dispertab 4 mg, 4 mg, Oral, Q4HRS PRN, Kenneth Brady M.D.  •  insulin regular (HumuLIN R,NovoLIN R) injection, 2-9 Units, Subcutaneous, 4X/DAY ACHS, 2 Units at 22 **AND** POC blood glucose manual result, , , Q AC AND BEDTIME(S) **AND** NOTIFY MD and PharmD, , , Once **AND** Administer 20 grams of glucose (approximately 8 ounces of fruit juice) every 15 minutes PRN FSBG less than 70 mg/dL, , , PRN **AND** dextrose 10 % BOLUS 25 g, 25 g, Intravenous, Q15 MIN PRN, Kenneth Brady M.D.  •  potassium chloride SA (Kdur) tablet 20 mEq, 20 mEq, Oral, DAILY, Kenneth Brady M.D., 20 mEq at 22 0734  •  insulin GLARGINE (Lantus,Semglee) injection, 15 Units, Subcutaneous, Q EVENING, Kenneth Brady M.D., 15 Units at 22 1758  •  furosemide (LASIX) injection 40 mg, 40 mg, Intravenous, BID DIURETIC, Kenneth Brady M.D., 40 mg at 22 0735    Past Medical History:   Diagnosis Date   • Diabetes (HCC)    • DM type 2 (diabetes mellitus, type 2) (HCC) 2016   • Essential hypertension 2016   • GERD (gastroesophageal reflux disease) 2016   • Hepatitis B 2016   • Hyperlipidemia 2016   • Vitamin D deficiency 2016       No past surgical history    Family History   Problem Relation Age of Onset   • Heart Disease Neg Hx      Patient family history was personally reviewed, no pertinent family history to current presentation    Social History     Tobacco Use   • Smoking status: Current Every Day Smoker     Packs/day: 0.50     Types: Cigarettes     Last attempt to quit: 12/3/2016     Years since quittin.3   • Smokeless tobacco: Never Used   • Tobacco comment: 10 CIGARETTES A DAY   Vaping Use   •  Vaping Use: Never used   Substance Use Topics   • Alcohol use: No     Alcohol/week: 0.0 oz   • Drug use: No       ALLERGIES:  No Known Allergies    Review of systems:  A complete review of symptoms was reviewed with patient. This is reviewed in H&P and PMH. ALL OTHERS reviewed and negative    Physical exam:  Patient Vitals for the past 24 hrs:   BP Temp Temp src Pulse Resp SpO2 Weight   04/04/22 0732 135/82 36.3 °C (97.4 °F) Temporal 95 16 95 % --   04/04/22 0411 135/82 36.1 °C (97 °F) Temporal 92 18 93 % --   04/03/22 2330 121/73 36.1 °C (97 °F) Temporal 91 18 95 % --   04/03/22 1924 122/73 36.4 °C (97.5 °F) -- 89 18 -- --   04/03/22 1900 124/75 36.7 °C (98.1 °F) Temporal 94 20 99 % --   04/03/22 1755 122/75 37 °C (98.6 °F) Temporal 94 18 93 % --   04/03/22 1742 122/75 37.1 °C (98.7 °F) Temporal 95 18 90 % --   04/03/22 1729 122/74 36.2 °C (97.2 °F) Temporal (!) 101 18 98 % --   04/03/22 1720 121/73 36.4 °C (97.6 °F) Temporal 92 18 100 % --   04/03/22 1601 129/78 36.2 °C (97.1 °F) Temporal 94 20 97 % --   04/03/22 1207 135/82 36.8 °C (98.2 °F) Temporal 98 20 100 % --   04/03/22 0822 139/88 35.9 °C (96.7 °F) Temporal 95 (!) 24 96 % 78 kg (171 lb 15.3 oz)     General: No acute distress.  Wearing supplemental oxygen 1 to 2 L  EYES: no jaundice  HEENT: OP clear   Neck:  No JVD.   CVS:   RRR.  Soft heart sounds holosystolic murmur  Resp: Normal respiratory effort, CTAB. No wheezing or crackles/rhonchi.  Abdomen: Soft, ND,   markedly distended scrotum  Skin: Grossly nothing acute no obvious rashes  Neurological: Alert, Moves all extremities  Extremities:   Mild edema. No cyanosis.       Data:  Laboratory studies personally reviewed by me:  Recent Results (from the past 24 hour(s))   POCT glucose device results    Collection Time: 04/03/22  8:06 AM   Result Value Ref Range    POC Glucose, Blood 102 (H) 65 - 99 mg/dL   EC-ECHOCARDIOGRAM COMPLETE W/O CONT    Collection Time: 04/03/22 11:18 AM   Result Value Ref Range     Eject.Frac. MOD BP 16.19     Eject.Frac. MOD 4C 17.56     Eject.Frac. MOD 2C 22.29     Left Ventrical Ejection Fraction 20    POCT glucose device results    Collection Time: 04/03/22  1:43 PM   Result Value Ref Range    POC Glucose, Blood 195 (H) 65 - 99 mg/dL   POCT glucose device results    Collection Time: 04/03/22  5:52 PM   Result Value Ref Range    POC Glucose, Blood 157 (H) 65 - 99 mg/dL   POCT glucose device results    Collection Time: 04/03/22  8:46 PM   Result Value Ref Range    POC Glucose, Blood 184 (H) 65 - 99 mg/dL   Basic Metabolic Panel    Collection Time: 04/04/22  1:11 AM   Result Value Ref Range    Sodium 134 (L) 135 - 145 mmol/L    Potassium 3.9 3.6 - 5.5 mmol/L    Chloride 95 (L) 96 - 112 mmol/L    Co2 28 20 - 33 mmol/L    Glucose 89 65 - 99 mg/dL    Bun 25 (H) 8 - 22 mg/dL    Creatinine 1.49 (H) 0.50 - 1.40 mg/dL    Calcium 9.7 8.5 - 10.5 mg/dL    Anion Gap 11.0 7.0 - 16.0   CBC WITH DIFFERENTIAL    Collection Time: 04/04/22  1:11 AM   Result Value Ref Range    WBC 9.1 4.8 - 10.8 K/uL    RBC 3.64 (L) 4.70 - 6.10 M/uL    Hemoglobin 9.3 (L) 14.0 - 18.0 g/dL    Hematocrit 29.0 (L) 42.0 - 52.0 %    MCV 79.7 (L) 81.4 - 97.8 fL    MCH 25.5 (L) 27.0 - 33.0 pg    MCHC 32.1 (L) 33.7 - 35.3 g/dL    RDW 47.7 35.9 - 50.0 fL    Platelet Count 206 164 - 446 K/uL    MPV 10.1 9.0 - 12.9 fL    Neutrophils-Polys 44.70 44.00 - 72.00 %    Lymphocytes 36.70 22.00 - 41.00 %    Monocytes 7.40 0.00 - 13.40 %    Eosinophils 10.30 (H) 0.00 - 6.90 %    Basophils 0.80 0.00 - 1.80 %    Immature Granulocytes 0.10 0.00 - 0.90 %    Nucleated RBC 0.00 /100 WBC    Neutrophils (Absolute) 4.08 1.82 - 7.42 K/uL    Lymphs (Absolute) 3.35 1.00 - 4.80 K/uL    Monos (Absolute) 0.68 0.00 - 0.85 K/uL    Eos (Absolute) 0.94 (H) 0.00 - 0.51 K/uL    Baso (Absolute) 0.07 0.00 - 0.12 K/uL    Immature Granulocytes (abs) 0.01 0.00 - 0.11 K/uL    NRBC (Absolute) 0.00 K/uL   MAGNESIUM    Collection Time: 04/04/22  1:11 AM   Result Value Ref  Range    Magnesium 1.9 1.5 - 2.5 mg/dL   ESTIMATED GFR    Collection Time: 04/04/22  1:11 AM   Result Value Ref Range    GFR (CKD-EPI) 51 (A) >60 mL/min/1.73 m 2       Imaging:  EC-ECHOCARDIOGRAM COMPLETE W/O CONT   Final Result      US-EXTREMITY VENOUS LOWER UNILAT RIGHT   Final Result      CT-ABDOMEN-PELVIS WITH   Final Result      1.  Diffuse anasarca. Small pleural effusions, mild ascites and body wall and scrotal edema.   2.  Large right inguinal hernia containing a loop of colon. No bowel obstruction.   3.  No definite evidence metastatic disease.   4.  Cardiomegaly.   5.  Nonspecific right iliacus fluid collection measuring 4.1 x 2.9 cm.      DX-CHEST-PORTABLE (1 VIEW)   Final Result      Cardiomegaly with mild interstitial edema consistent with CHF. Possible trace pleural effusions.      CL-LEFT HEART CATHETERIZATION WITH POSSIBLE INTERVENTION    (Results Pending)           EKG tracings personally reviewed by me sinus tachycardia with nonspecific repolarization changes    Echocardiogram images personally reviewed by me show ejection fraction less than 20% with right ventricular dysfunction and pulmonary hypertension    All pertinent features of laboratory and imaging reviewed including primary images where applicable      Principal Problem:    Anasarca (Chronic) POA: Yes  Active Problems:    Dyslipidemia (Chronic) POA: Yes    Diabetic polyneuropathy associated with type 2 diabetes mellitus (HCC) POA: Yes    Essential hypertension (Chronic) POA: Yes    Anemia POA: Yes      Overview: Hg 10.1> ORDered ffib fobt, referred GI @@    Congestive heart failure of unknown etiology (HCC) (Chronic) POA: Yes    Leg edema POA: Unknown    Right inguinal hernia POA: Unknown    Pelvic fluid collection POA: Unknown    Tobacco dependence POA: Unknown    Elevated troponin POA: Unknown  Resolved Problems:    * No resolved hospital problems. *      Assessment / Plan:  Acute newly diagnosed congestive heart failure with severely  reduced ejection fraction  Most likely ischemic in nature given his risk factors with diabetes and hypertension no toxic habits  We will arrange for cardiac catheterization depending on ability with the Xarelto for DVT prophylaxis either today or tomorrow  Given his microcytic anemia and likely GI blood loss this will be important to procedural risk for further work-up there is he is found to have severe coronary disease that is critical and needs urgent stenting he may just get a diagnostic angiogram and then complete the work-up for his anemia prior to revascularization    [place] CHF medication multifactorial with beta-blockers Arni, spironolactone, SGLT2 inhibitors and diuretics      I personally discussed his case with  Dr Dennis Arevalo M.D.    Future Appointments   Date Time Provider Department Center   4/6/2022  1:00 PM Mitesh Sorto M.D. [facility]       It is my pleasure to participate in the care of Mr. Keith.  Please do not hesitate to contact me with questions or concerns.    Bennie Coreria MD PhD [facility]  Cardiologist [facility]    4/4/2022    Please note that this dictation was created using voice recognition software. There may be errors I did not discover before finalizing the note.

## 2022-04-04 NOTE — PROGRESS NOTES
[facility] Medicine Daily Progress Note    Date of Service  4/4/2022    Chief Complaint  Chucky Keith is a 66 y.o. male admitted 4/2/2022 with shortness of breath    Hospital Course  Chucky Keith is a 66 y.o. male who presented 4/2/2022 with generalized edema and right scrotal swelling.  He reports that over the past month he has noted increasing swelling and over the past few days he has noted progressive dyspnea on exertion associated with orthopnea.  He denies any fever or chills.  He became concerned about the increasing scrotal swelling that he presented to the emergency department.  He has a history of diabetes dyslipidemia and hypertension.  He denies any known history of cardiac disease.  No trauma.  No fever no chills.  His diabetes has been controlled on Lantus.      Interval Problem Update  4/3/2022:  Patient continuing with aggressive diuresis patient still has approximately 1-2+ pitting edema up to the mid lower leg bilaterally.  No acute events overnight patient endorses improvement of breathing.  Offers no other concerns at this time.  Plan is to continue with aggressive diuresis when appropriate transition to beta-blocker upon reaching euvolemia continue with losartan echocardiogram noted patient also require spironolactone ejection fraction of approximately 20%.    4/4/2022:  Patient had echocardiogram performed in the afternoon of 4/3/2022 which showed a significantly reduced ejection fraction approximately 20% patient insulin-dependent diabetic I consulted cardiology because patient likely would benefit from ischemic evaluation the form of cardiac catheterization.  Patient did have increasing creatinine today I have held Lasix that present in addition to metolazone.  May consider nephrology consult following outcome of cardiac catheterization.  I have personally seen and examined the patient at bedside. I discussed the plan of care with patient, bedside RN and  cardiology.    Consultants/Specialty  cardiology    Code Status  Full Code    Disposition  Patient is not medically cleared for discharge.   Anticipate discharge to to home with close outpatient follow-up.  I have placed the appropriate orders for post-discharge needs.    Review of Systems  Review of Systems   Constitutional: Negative for chills, diaphoresis, fever, malaise/fatigue and weight loss.   HENT: Negative for congestion, nosebleeds, sinus pain and sore throat.    Eyes: Negative for double vision, pain, discharge and redness.   Respiratory: Positive for shortness of breath. Negative for cough, sputum production, wheezing and stridor.    Cardiovascular: Positive for leg swelling. Negative for chest pain and palpitations.   Gastrointestinal: Negative for blood in stool, diarrhea, nausea and vomiting.   Genitourinary: Negative for flank pain, frequency and urgency.   Musculoskeletal: Negative for back pain, joint pain, myalgias and neck pain.   Skin: Negative for itching and rash.   Neurological: Negative for dizziness, tingling, seizures, weakness and headaches.   Endo/Heme/Allergies: Negative for polydipsia. Does not bruise/bleed easily.   Psychiatric/Behavioral: Negative for memory loss. The patient is not nervous/anxious and does not have insomnia.         Physical Exam  Temp:  [36.1 °C (97 °F)-37.1 °C (98.7 °F)] 36.7 °C (98 °F)  Pulse:  [] 88  Resp:  [15-20] 16  BP: (121-135)/(73-82) 133/80  SpO2:  [90 %-100 %] 92 %    Physical Exam  Vitals and nursing note reviewed.   Constitutional:       General: He is not in acute distress.     Appearance: Normal appearance. He is well-developed. He is obese. He is not ill-appearing or diaphoretic.   HENT:      Head: Normocephalic and atraumatic.      Mouth/Throat:      Pharynx: No oropharyngeal exudate.   Eyes:      General: No scleral icterus.        Right eye: No discharge.         Left eye: No discharge.      Conjunctiva/sclera: Conjunctivae normal.       Pupils: Pupils are equal, round, and reactive to light.   Neck:      Vascular: No JVD.      Trachea: No tracheal deviation.   Cardiovascular:      Rate and Rhythm: Regular rhythm. Tachycardia present.      Heart sounds: No murmur heard.    No friction rub. No gallop.   Pulmonary:      Effort: Pulmonary effort is normal. No respiratory distress.      Breath sounds: No stridor. Rales present. No wheezing.   Chest:      Chest wall: No tenderness.   Abdominal:      General: Bowel sounds are normal. There is no distension.      Palpations: Abdomen is soft.      Tenderness: There is no abdominal tenderness. There is no rebound.      Hernia: A hernia (Right inguinal) is present.   Musculoskeletal:         General: Swelling present. No tenderness.      Cervical back: Neck supple.      Right lower leg: Edema present.      Left lower leg: Edema present.   Skin:     General: Skin is warm and dry.      Nails: There is no clubbing.   Neurological:      Mental Status: He is alert and oriented to person, place, and time.      Cranial Nerves: No cranial nerve deficit.      Motor: No abnormal muscle tone.   Psychiatric:         Behavior: Behavior normal.         Fluids    Intake/Output Summary (Last 24 hours) at 4/4/2022 1444  Last data filed at 4/4/2022 1122  Gross per 24 hour   Intake 500 ml   Output 3975 ml   Net -3475 ml       Laboratory  Recent Labs     04/02/22  1131 04/03/22  0122 04/04/22  0111   WBC 7.8 8.9 9.1   RBC 3.91* 3.72* 3.64*   HEMOGLOBIN 9.9* 9.5* 9.3*   HEMATOCRIT 31.8* 30.6* 29.0*   MCV 81.3* 82.3 79.7*   MCH 25.3* 25.5* 25.5*   MCHC 31.1* 31.0* 32.1*   RDW 49.5 49.6 47.7   PLATELETCT 211 214 206   MPV 9.5 10.0 10.1     Recent Labs     04/02/22  1131 04/03/22  0122 04/04/22  0111   SODIUM 136 137 134*   POTASSIUM 4.4 4.0 3.9   CHLORIDE 103 103 95*   CO2 22 25 28   GLUCOSE 169* 119* 89   BUN 19 19 25*   CREATININE 1.13 1.24 1.49*   CALCIUM 9.1 9.2 9.7                   Imaging  EC-ECHOCARDIOGRAM COMPLETE W/O CONT    Final Result      US-EXTREMITY VENOUS LOWER UNILAT RIGHT   Final Result      CT-ABDOMEN-PELVIS WITH   Final Result      1.  Diffuse anasarca. Small pleural effusions, mild ascites and body wall and scrotal edema.   2.  Large right inguinal hernia containing a loop of colon. No bowel obstruction.   3.  No definite evidence metastatic disease.   4.  Cardiomegaly.   5.  Nonspecific right iliacus fluid collection measuring 4.1 x 2.9 cm.      DX-CHEST-PORTABLE (1 VIEW)   Final Result      Cardiomegaly with mild interstitial edema consistent with CHF. Possible trace pleural effusions.      CL-LEFT HEART CATHETERIZATION WITH POSSIBLE INTERVENTION    (Results Pending)        Assessment/Plan  * Anasarca- (present on admission)  Assessment & Plan  Suspect acute CHF    Patient will be admitted and started on IV Lasix  We will check echocardiogram  Continue losartan further work-up depending on echocardiogram results      Acute systolic heart failure (HCC)  Assessment & Plan  Patient with newly diagnosed heart failure with reduced ejection fraction approximately 20%  Plan:  1.  Aggressive diuresis to reach euvolemic state  2.  Ischemic work-up in the form of cardiac catheterization patient has significant risk factors the form of insulin-dependent diabetes.  3.  ACE inhibitor/ARB or Arni as appropriate beta-blocker when euvolemic high intensity statin spironolactone.    Follow-up with cardiology recommendations.    Elevated troponin  Assessment & Plan  Likely demand ischemia  Recheck troponin follow-up on echocardiogram    Tobacco dependence  Assessment & Plan  Counseled on cessation    Pelvic fluid collection  Assessment & Plan  Reviewed and discussed CT findings with Dr. Pavon from interventional radiology   Dr Pavon has low threshold to drain fluid collection if any signs of infection, patient currently has no clinical signs(afebrile with no leukocytosis) and he would benefit from further stabilization of his anasarca  and suspected CHF prior to any invasive procedures  Dr. Pavon recommended ensuring repeat CT in 1 week to follow-up     Right inguinal hernia  Assessment & Plan  Will need surgery evaluation for elective repair    Leg edema  Assessment & Plan  Follow-up on pending venous duplex    Congestive heart failure of unknown etiology (HCC)- (present on admission)  Assessment & Plan  Start diuresis and check echocardiogram    Anemia- (present on admission)  Assessment & Plan  Chronic anemia  We will check iron panel  Monitor H&H  Patient's PCP notes reviewed he has already initiated GI referral    Essential hypertension- (present on admission)  Assessment & Plan  Continue losartan  Monitor blood pressure    Diabetic polyneuropathy associated with type 2 diabetes mellitus (HCC)- (present on admission)  Assessment & Plan  Continue Lantus  Insulin sliding scale monitor CBGs  Most recent hemoglobin A1c 7.0    Dyslipidemia- (present on admission)  Assessment & Plan  Continue simvastatin       VTE prophylaxis: SCDs/TEDs, therapeutic anticoagulation with Xarelto and pharmacologic prophylaxis contraindicated due to Not applicable    I have performed a physical exam and reviewed and updated ROS and Plan today (4/4/2022). In review of yesterday's note (4/3/2022), there are no changes except as documented above.

## 2022-04-04 NOTE — PROGRESS NOTES
Cardiology consultation requested for tomorrow for HFrEF, full cardiology consultation to follow on 4-4-2022.

## 2022-04-05 LAB
ANION GAP SERPL CALC-SCNC: 9 MMOL/L (ref 7–16)
BASOPHILS # BLD AUTO: 0.7 % (ref 0–1.8)
BASOPHILS # BLD: 0.06 K/UL (ref 0–0.12)
BUN SERPL-MCNC: 21 MG/DL (ref 8–22)
CALCIUM SERPL-MCNC: 10.1 MG/DL (ref 8.5–10.5)
CHLORIDE SERPL-SCNC: 94 MMOL/L (ref 96–112)
CO2 SERPL-SCNC: 33 MMOL/L (ref 20–33)
CREAT SERPL-MCNC: 1.28 MG/DL (ref 0.5–1.4)
EOSINOPHIL # BLD AUTO: 0.87 K/UL (ref 0–0.51)
EOSINOPHIL NFR BLD: 10.3 % (ref 0–6.9)
ERYTHROCYTE [DISTWIDTH] IN BLOOD BY AUTOMATED COUNT: 47.6 FL (ref 35.9–50)
GFR SERPLBLD CREATININE-BSD FMLA CKD-EPI: 61 ML/MIN/1.73 M 2
GLUCOSE BLD STRIP.AUTO-MCNC: 177 MG/DL (ref 65–99)
GLUCOSE BLD STRIP.AUTO-MCNC: 72 MG/DL (ref 65–99)
GLUCOSE SERPL-MCNC: 48 MG/DL (ref 65–99)
HCT VFR BLD AUTO: 31.3 % (ref 42–52)
HEMOCCULT SP1 STL QL: NEGATIVE
HEMOCCULT SP2 STL QL: NEGATIVE
HGB BLD-MCNC: 10.1 G/DL (ref 14–18)
IMM GRANULOCYTES # BLD AUTO: 0.02 K/UL (ref 0–0.11)
IMM GRANULOCYTES NFR BLD AUTO: 0.2 % (ref 0–0.9)
LYMPHOCYTES # BLD AUTO: 2.83 K/UL (ref 1–4.8)
LYMPHOCYTES NFR BLD: 33.6 % (ref 22–41)
MAGNESIUM SERPL-MCNC: 1.9 MG/DL (ref 1.5–2.5)
MCH RBC QN AUTO: 25.6 PG (ref 27–33)
MCHC RBC AUTO-ENTMCNC: 32.3 G/DL (ref 33.7–35.3)
MCV RBC AUTO: 79.4 FL (ref 81.4–97.8)
MONOCYTES # BLD AUTO: 0.76 K/UL (ref 0–0.85)
MONOCYTES NFR BLD AUTO: 9 % (ref 0–13.4)
NEUTROPHILS # BLD AUTO: 3.88 K/UL (ref 1.82–7.42)
NEUTROPHILS NFR BLD: 46.2 % (ref 44–72)
NRBC # BLD AUTO: 0 K/UL
NRBC BLD-RTO: 0 /100 WBC
PLATELET # BLD AUTO: 234 K/UL (ref 164–446)
PMV BLD AUTO: 10 FL (ref 9–12.9)
POTASSIUM SERPL-SCNC: 3.7 MMOL/L (ref 3.6–5.5)
RBC # BLD AUTO: 3.94 M/UL (ref 4.7–6.1)
SODIUM SERPL-SCNC: 136 MMOL/L (ref 135–145)
WBC # BLD AUTO: 8.4 K/UL (ref 4.8–10.8)

## 2022-04-05 PROCEDURE — 700102 HCHG RX REV CODE 250 W/ 637 OVERRIDE(OP): Performed by: STUDENT IN AN ORGANIZED HEALTH CARE EDUCATION/TRAINING PROGRAM

## 2022-04-05 PROCEDURE — 83735 ASSAY OF MAGNESIUM: CPT

## 2022-04-05 PROCEDURE — 700111 HCHG RX REV CODE 636 W/ 250 OVERRIDE (IP): Performed by: STUDENT IN AN ORGANIZED HEALTH CARE EDUCATION/TRAINING PROGRAM

## 2022-04-05 PROCEDURE — 99232 SBSQ HOSP IP/OBS MODERATE 35: CPT | Performed by: INTERNAL MEDICINE

## 2022-04-05 PROCEDURE — A9270 NON-COVERED ITEM OR SERVICE: HCPCS | Performed by: STUDENT IN AN ORGANIZED HEALTH CARE EDUCATION/TRAINING PROGRAM

## 2022-04-05 PROCEDURE — 700111 HCHG RX REV CODE 636 W/ 250 OVERRIDE (IP): Performed by: HOSPITALIST

## 2022-04-05 PROCEDURE — 700102 HCHG RX REV CODE 250 W/ 637 OVERRIDE(OP): Performed by: INTERNAL MEDICINE

## 2022-04-05 PROCEDURE — 82270 OCCULT BLOOD FECES: CPT

## 2022-04-05 PROCEDURE — 80048 BASIC METABOLIC PNL TOTAL CA: CPT

## 2022-04-05 PROCEDURE — A9270 NON-COVERED ITEM OR SERVICE: HCPCS | Performed by: INTERNAL MEDICINE

## 2022-04-05 PROCEDURE — 82962 GLUCOSE BLOOD TEST: CPT | Mod: 91

## 2022-04-05 PROCEDURE — 85025 COMPLETE CBC W/AUTO DIFF WBC: CPT

## 2022-04-05 PROCEDURE — 700105 HCHG RX REV CODE 258: Performed by: STUDENT IN AN ORGANIZED HEALTH CARE EDUCATION/TRAINING PROGRAM

## 2022-04-05 PROCEDURE — 700102 HCHG RX REV CODE 250 W/ 637 OVERRIDE(OP): Performed by: HOSPITALIST

## 2022-04-05 PROCEDURE — 36415 COLL VENOUS BLD VENIPUNCTURE: CPT

## 2022-04-05 PROCEDURE — 99232 SBSQ HOSP IP/OBS MODERATE 35: CPT | Performed by: STUDENT IN AN ORGANIZED HEALTH CARE EDUCATION/TRAINING PROGRAM

## 2022-04-05 PROCEDURE — 770020 HCHG ROOM/CARE - TELE (206)

## 2022-04-05 PROCEDURE — 94760 N-INVAS EAR/PLS OXIMETRY 1: CPT

## 2022-04-05 PROCEDURE — A9270 NON-COVERED ITEM OR SERVICE: HCPCS | Performed by: HOSPITALIST

## 2022-04-05 RX ORDER — FUROSEMIDE 40 MG/1
40 TABLET ORAL
Status: DISCONTINUED | OUTPATIENT
Start: 2022-04-06 | End: 2022-04-06

## 2022-04-05 RX ADMIN — Medication 2000 UNITS: at 05:10

## 2022-04-05 RX ADMIN — SODIUM CHLORIDE 125 MG: 9 INJECTION, SOLUTION INTRAVENOUS at 17:22

## 2022-04-05 RX ADMIN — ROSUVASTATIN CALCIUM 40 MG: 20 TABLET, FILM COATED ORAL at 17:13

## 2022-04-05 RX ADMIN — FUROSEMIDE 40 MG: 10 INJECTION, SOLUTION INTRAMUSCULAR; INTRAVENOUS at 08:36

## 2022-04-05 RX ADMIN — INSULIN HUMAN 2 UNITS: 100 INJECTION, SOLUTION PARENTERAL at 17:17

## 2022-04-05 RX ADMIN — METOPROLOL TARTRATE 12.5 MG: 25 TABLET, FILM COATED ORAL at 21:27

## 2022-04-05 RX ADMIN — POTASSIUM CHLORIDE 20 MEQ: 20 TABLET, EXTENDED RELEASE ORAL at 05:10

## 2022-04-05 RX ADMIN — LOSARTAN POTASSIUM 25 MG: 25 TABLET, FILM COATED ORAL at 05:10

## 2022-04-05 RX ADMIN — SENNOSIDES AND DOCUSATE SODIUM 2 TABLET: 50; 8.6 TABLET ORAL at 05:11

## 2022-04-05 RX ADMIN — INSULIN GLARGINE 15 UNITS: 100 INJECTION, SOLUTION SUBCUTANEOUS at 17:18

## 2022-04-05 RX ADMIN — INSULIN HUMAN 5 UNITS: 100 INJECTION, SOLUTION PARENTERAL at 21:18

## 2022-04-05 ASSESSMENT — ENCOUNTER SYMPTOMS
CHEST TIGHTNESS: 0
WEIGHT LOSS: 0
PALPITATIONS: 0
MYALGIAS: 0
HEADACHES: 0
SINUS PAIN: 0
POLYDIPSIA: 0
NAUSEA: 0
BACK PAIN: 0
BLOOD IN STOOL: 0
NERVOUS/ANXIOUS: 0
COUGH: 0
SHORTNESS OF BREATH: 1
DOUBLE VISION: 0
DIZZINESS: 0
EYE DISCHARGE: 0
FLANK PAIN: 0
TINGLING: 0
EYE PAIN: 0
CHILLS: 0
SORE THROAT: 0
SHORTNESS OF BREATH: 0
NECK PAIN: 0
DIAPHORESIS: 0
STRIDOR: 0
BRUISES/BLEEDS EASILY: 0
VOMITING: 0
WHEEZING: 0
WEAKNESS: 0
SPUTUM PRODUCTION: 0
MEMORY LOSS: 0
SEIZURES: 0
INSOMNIA: 0
FEVER: 0
EYE REDNESS: 0
DIARRHEA: 0

## 2022-04-05 ASSESSMENT — FIBROSIS 4 INDEX
FIB4 SCORE: 1.57
FIB4 SCORE: 1.57

## 2022-04-05 ASSESSMENT — COGNITIVE AND FUNCTIONAL STATUS - GENERAL
SUGGESTED CMS G CODE MODIFIER DAILY ACTIVITY: CJ
MOVING TO AND FROM BED TO CHAIR: A LITTLE
WALKING IN HOSPITAL ROOM: A LITTLE
STANDING UP FROM CHAIR USING ARMS: A LITTLE
MOVING FROM LYING ON BACK TO SITTING ON SIDE OF FLAT BED: A LITTLE
HELP NEEDED FOR BATHING: A LITTLE
TURNING FROM BACK TO SIDE WHILE IN FLAT BAD: A LITTLE
CLIMB 3 TO 5 STEPS WITH RAILING: A LITTLE
DRESSING REGULAR LOWER BODY CLOTHING: A LITTLE
SUGGESTED CMS G CODE MODIFIER MOBILITY: CK
DAILY ACTIVITIY SCORE: 22
MOBILITY SCORE: 18

## 2022-04-05 ASSESSMENT — PAIN DESCRIPTION - PAIN TYPE
TYPE: ACUTE PAIN

## 2022-04-05 NOTE — CARE PLAN
The patient is Stable - Low risk of patient condition declining or worsening    Shift Goals  Clinical Goals: diuresis, wean O2  Patient Goals: start feeling better  Family Goals: NA    Progress made toward(s) clinical / shift goals:  Patient weaned to 2L O2. Able to ambulate standby assist, sitting up in the chair. Continuing with IV diuresis with adequate urine output.      Problem: Pain - Standard  Goal: Alleviation of pain or a reduction in pain to the patient’s comfort goal  Outcome: Progressing     Problem: Skin Integrity  Goal: Skin integrity is maintained or improved  Outcome: Progressing     Problem: Fall Risk  Goal: Patient will remain free from falls  Outcome: Progressing     Problem: Respiratory  Goal: Patient will achieve/maintain optimum respiratory ventilation and gas exchange  Outcome: Progressing     Problem: Mobility  Goal: Patient's capacity to carry out activities will improve  Outcome: Progressing

## 2022-04-05 NOTE — PROGRESS NOTES
Heart failure booklet reviewed with patient with assistance from  services. Reviewed symptom management, daily weights/BPs, heart failure medications, low sodium diet, and when to call doctor and 911. Patient verbalizes understanding, denies questions.

## 2022-04-05 NOTE — HEART FAILURE PROGRAM
New diagnosis of heart failure with reduced ejection fraction (HFrEF).   EF: 20%. Patient does carry diagnoses of diabetes and HTN.    Discharge Planning  • Residence: local  • Insurance: Medicare  • Indication on facesheet for Hydralazine Hydrochloride/Isosorbide- Dinitrate? no  • Referral to disease management program specializing in heart failure care- requested that schedulers notify me if patient cannot be scheduled at the [facility]  • Opted in for Meds to Beds? Not addressed  • LACE Risk Score: 80  • General Risk Score: 6  • I placed an order for SW asking that they look into whether or not patient's insurance offers outpatient care coordination/case management services.      Special Clinical Considerations Pertinent to HF    • Pneumococcal vaccine: 2020  • Influenza vaccine: n/a  • Tobacco Status: active  • Documentation of tobacco cessation counseling: documented in Dr. Arvealo's note 4/4/22  • DM dx: yes- rosuvastatin and insulin  • Atrial arrhythmia dx: no    Nurses: HF has been added as a title to the education tab and to the care plan. Would you please take credit for the great work that you're doing by documenting in these? Thank you!  Providers: below are Guideline Directed Medical Therapy (GDMT) for HFrEF. If any cannot be prescribed by discharge, would you please note the clinical reason for each in your discharge summary? Thanks! Jackie  • Evidence Based Beta Blocker (bisoprolol, carvedilol, or toprol xl), for EF of 40% or less  • LORRAINE - I, for EF of 40% or less ARNI is preferred If not cost prohibitive for patient  • SGLT2 inhibitor with proven ASCVD, HF, or DKD benefit Jardiance/empagliflozin): If not cost prohibitive for patient  • Aldosterone antagonist, for EF of 35% or less, if applicable  • Anticoagulation for atrial arrhythmia, if applicable  • Glycemic control for DM + HF, if applicable  • Lipid lowering medication for DM + HF, if applicable  • Hydralazine Hydrochloride/Isosorbide  Dinitrate. The combination of hydralazine and isosorbide- dinitrate is recommended to reduce morbidity and mortality for patients self-described  Americans with NYHA class III-IV HFrEF (EF 40% or less), receiving optimal therapy with ACE inhibitors and beta blockers, unless contraindicated (Class I, DANAE: A).  • Pneumococcal vaccine, if not previously received  • Influenza vaccine for current season, if not previously received  • Smoking cessation counseling documented, if applicable  • Device screening, if applicable  • Referral to disease management program specializing in heart failure care- requested that schedulers notify me if patient cannot be scheduled at the [facility]

## 2022-04-05 NOTE — CARE PLAN
The patient is Stable - Low risk of patient condition declining or worsening      Problem: Care Map:  Day 1 Optimal Outcome for the Heart Failure Patient  Goal: Day 1:  Optimal Care of the heart failure patient  4/5/2022 1621 by Eulalia Carl R.N.  Outcome: Progressing     Problem: Care Map:  Day 2 Optimal Outcome for the Heart Failure Patient  Goal: Day 2:  Optimal Care of the heart failure patient  4/5/2022 1621 by Eulalia Carl, R.N.  Outcome: Progressing  Home O2 performed with patient, heart failure booklet reviewed at bedside.

## 2022-04-05 NOTE — CARE PLAN
The patient is Stable - Low risk of patient condition declining or worsening    Shift Goals  Clinical Goals: Diurese  Patient Goals: rest, get better  Family Goals: lona    Progress made toward(s) clinical / shift goals:    Problem: Knowledge Deficit - Standard  Goal: Patient and family/care givers will demonstrate understanding of plan of care, disease process/condition, diagnostic tests and medications  Outcome: Progressing     Problem: Fall Risk  Goal: Patient will remain free from falls  Outcome: Progressing     Problem: Respiratory  Goal: Patient will achieve/maintain optimum respiratory ventilation and gas exchange  Outcome: Progressing       Patient is not progressing towards the following goals:

## 2022-04-05 NOTE — PROGRESS NOTES
Cardiology Follow Up Progress Note    Date of Service  4/5/2022    Attending Physician  Thomas Patel*    HPI  Pac Keith is a 66 y.o. male admitted 4/2/2022 with newly diagnosed acute HFrEF 20% with prior history of hypertension, dyslipidemia, DM and chronic tobacco use with coronary angiography 4/2/2022 showing CAD involving the LAD and RCA, LAD PCI and antiplatelet therapy deferred until iron deficiency anemia defined and evaluation complete.    Interim Events  4/5: /65.  HR 90.  Sinus.  No cardiac symptoms.  No cardiac events.    Review of Systems  Review of Systems   Respiratory: Negative for chest tightness and shortness of breath.    Cardiovascular: Negative for palpitations.   Gastrointestinal: Negative for nausea.   Neurological: Negative for dizziness.       Vital signs in last 24 hours  Temp:  [36.4 °C (97.5 °F)-37.2 °C (98.9 °F)] 36.7 °C (98.1 °F)  Pulse:  [85-97] 90  Resp:  [15-18] 16  BP: (103-135)/(55-80) 110/65  SpO2:  [92 %-100 %] 97 %    Physical Exam  Physical Exam  Constitutional:       General: He is not in acute distress.     Comments: Frail.   HENT:      Head: Normocephalic.   Eyes:      General: No scleral icterus.     Extraocular Movements: Extraocular movements intact.   Cardiovascular:      Rate and Rhythm: Normal rate and regular rhythm.      Heart sounds: Normal heart sounds, S1 normal and S2 normal. No murmur heard.    No friction rub. No gallop.   Pulmonary:      Effort: Pulmonary effort is normal.      Breath sounds: Normal breath sounds. No wheezing, rhonchi or rales.   Musculoskeletal:      Right lower leg: No edema.      Left lower leg: No edema.      Comments: Diffuse atrophy   Skin:     General: Skin is warm and dry.   Neurological:      Mental Status: He is alert and oriented to person, place, and time.   Psychiatric:         Behavior: Behavior normal.         Lab Review  Lab Results   Component Value Date/Time    WBC 8.4 04/05/2022 01:10 AM    RBC 3.94 (L)  04/05/2022 01:10 AM    HEMOGLOBIN 10.1 (L) 04/05/2022 01:10 AM    HEMATOCRIT 31.3 (L) 04/05/2022 01:10 AM    MCV 79.4 (L) 04/05/2022 01:10 AM    MCH 25.6 (L) 04/05/2022 01:10 AM    MCHC 32.3 (L) 04/05/2022 01:10 AM    MPV 10.0 04/05/2022 01:10 AM      Lab Results   Component Value Date/Time    SODIUM 136 04/05/2022 01:10 AM    POTASSIUM 3.7 04/05/2022 01:10 AM    CHLORIDE 94 (L) 04/05/2022 01:10 AM    CO2 33 04/05/2022 01:10 AM    GLUCOSE 48 (L) 04/05/2022 01:10 AM    BUN 21 04/05/2022 01:10 AM    CREATININE 1.28 04/05/2022 01:10 AM    CREATININE 0.9 12/29/2005 04:08 PM      Lab Results   Component Value Date/Time    ASTSGOT 30 04/02/2022 11:31 AM    ALTSGPT 31 04/02/2022 11:31 AM     Lab Results   Component Value Date/Time    CHOLSTRLTOT 91 (L) 03/28/2022 12:21 PM    LDL 48 03/28/2022 12:21 PM    HDL 34 (A) 03/28/2022 12:21 PM    TRIGLYCERIDE 46 03/28/2022 12:21 PM    TROPONINT 25 (H) 04/02/2022 05:04 PM       Recent Labs     04/02/22  1131   NTPROBNP [id number]*       Cardiac Imaging and Procedures Review  EKG:  My personal interpretation of the EKG dated/2/2022 is sinus tachycardia, rate 102, nonspecific ST-T changes    Rhythm: My personal interpretation of the rhythm dated 4/5/2022 is sinus rhythm.    Echocardiogram: 4/3/2022  Severely reduced left ventricular systolic function.  Global hypokinesis, relatively preserved wall motion laterally.  Grade III diastolic dysfunction.  Dilated IVC without inspiratory collapse.  Reduced right ventricular systolic function.  Moderate tricuspid regurgitation.  Estimated right ventricular systolic pressure is 62  mmHg.  Incidental note of right atrial mass - most consistent with a prominent   eustachian valve (normal anatomic variation); correlate clinically.    Cardiac Catheterization:/4/2022  1.  Left main coronary artery:  Normal.  2.  Left anterior descending artery: Mid LAD has 80% calcific stenosis after first diagonal takeoff.  First diagonal has luminal irregularities.   Second diagonal is smaller branch, has 70% stenosis in proximal portion  3.  Left circumflex coronary artery: Proximal, midportion, large first marginal free of significant disease.  Distal AV groove branch has moderate stenosis.  4.  Right coronary artery: 60-70% stenosis in proximal portion.  Diffuse 60% stenosis in midportion.  Right posterolateral branch is diffusely diseased with a focal 70% stenosis in the midportion .  This is a right dominant system.  5.  Left ventricular end diastolic pressure:  19 mmHg.  No signficant gradient across the aortic valve.       Imaging  Chest X-Ray: 4/2/2022  Cardiomegaly with mild interstitial edema consistent with CHF. Possible trace pleural effusions.     Assessment  1.  HFrEF 20%.  2.  Cardiomyopathy suspected ischemic.  3.  CAD, LAD bifurcation and RCA.  4.  Hypertension.  5.  Dyslipidemia.  6.  Diabetes mellitus  7.  Chronic tobacco use.    Recommendation Discussion  1.  HFrEF, ICM: Appears euvolemic with UO of 2800 cc, would transition to p.o. furosemide, continue losartan, add beta-blocker once euvolemic if BP allows, add spironolactone if renal function remains stable and improving, eventually start empagliflozin.  2.  CAD: Continue atorvastatin, off antiplatelet therapy due to anemia, planned PCI in the future once anemia defined.  3.  Hypertension: BP normal, at goal, continue losartan.  4.  Dyslipidemia: Continue atorvastatin, LDL 48 at goal.  5.  FELICITA: Improved.  6.  Anemia: Work-up per primary team.    Thank you for allowing me to participate in the care of this patient.    Please contact me with any questions.    Minh Rodriguez M.D.   Cardiologist, [facility]  [phone]

## 2022-04-05 NOTE — PROGRESS NOTES
[facility] Medicine Daily Progress Note    Date of Service  4/5/2022    Chief Complaint  Chucky Keith is a 66 y.o. male admitted 4/2/2022 with shortness of breath     Hospital Course  Chucky Keith is a 66 y.o. male who presented 4/2/2022 with generalized edema and right scrotal swelling.  He reports that over the past month he has noted increasing swelling and over the past few days he has noted progressive dyspnea on exertion associated with orthopnea.  He denies any fever or chills.  He became concerned about the increasing scrotal swelling that he presented to the emergency department.  He has a history of diabetes dyslipidemia and hypertension.  He denies any known history of cardiac disease.  No trauma.  No fever no chills.  His diabetes has been controlled on Lantus.      Interval Problem Update  4/3/2022:  Patient continuing with aggressive diuresis patient still has approximately 1-2+ pitting edema up to the mid lower leg bilaterally.  No acute events overnight patient endorses improvement of breathing.  Offers no other concerns at this time.  Plan is to continue with aggressive diuresis when appropriate transition to beta-blocker upon reaching euvolemia continue with losartan echocardiogram noted patient also require spironolactone ejection fraction of approximately 20%.    4/4/2022:  Patient had echocardiogram performed in the afternoon of 4/3/2022 which showed a significantly reduced ejection fraction approximately 20% patient insulin-dependent diabetic I consulted cardiology because patient likely would benefit from ischemic evaluation the form of cardiac catheterization.  Patient did have increasing creatinine today I have held Lasix that present in addition to metolazone.  May consider nephrology consult following outcome of cardiac catheterization.    4/5/2022  Patient evaluated bedside, patient no acute distress eating meal, happy.  Oxygen titrated down to 1 L nasal cannula.  Hemoglobin stable and has remained  above 9 throughout hospital stay.  Patient reporting 10-day history of melena but none noted by staff.  Patient denies history of anemia or EGD/colonoscopy in the past.  Iron studies suggestive of mild iron deficiency anemia, IV iron per pharmacy, avoid false positive FOBT.  Pending FOBT.  Cardiology following and holding off on initiating antiplatelet therapy in the setting of possible GI bleed.  Cardiology planning for PCI in near future, appreciate recommendations. Start oral PPI.  Continue monitoring. Labs on a.m.    I have personally seen and examined the patient at bedside. I discussed the plan of care with patient, bedside RN and cardiology.    Consultants/Specialty  cardiology    Code Status  Full Code    Disposition  Patient is not medically cleared for discharge.   Anticipate discharge to to home with close outpatient follow-up.  I have placed the appropriate orders for post-discharge needs.    Review of Systems  Review of Systems   Constitutional: Negative for chills, diaphoresis, fever, malaise/fatigue and weight loss.   HENT: Negative for congestion, nosebleeds, sinus pain and sore throat.    Eyes: Negative for double vision, pain, discharge and redness.   Respiratory: Positive for shortness of breath. Negative for cough, sputum production, wheezing and stridor.    Cardiovascular: Positive for leg swelling. Negative for chest pain and palpitations.   Gastrointestinal: Negative for blood in stool, diarrhea, nausea and vomiting.   Genitourinary: Negative for flank pain, frequency and urgency.   Musculoskeletal: Negative for back pain, joint pain, myalgias and neck pain.   Skin: Negative for itching and rash.   Neurological: Negative for dizziness, tingling, seizures, weakness and headaches.   Endo/Heme/Allergies: Negative for polydipsia. Does not bruise/bleed easily.   Psychiatric/Behavioral: Negative for memory loss. The patient is not nervous/anxious and does not have insomnia.         Physical  Exam  Temp:  [36.4 °C (97.5 °F)-37.2 °C (98.9 °F)] 36.6 °C (97.8 °F)  Pulse:  [] 102  Resp:  [16-18] 16  BP: (103-135)/(55-78) 105/60  SpO2:  [94 %-100 %] 95 %    Physical Exam  Vitals and nursing note reviewed.   Constitutional:       General: He is not in acute distress.     Appearance: Normal appearance. He is well-developed. He is obese. He is not ill-appearing or diaphoretic.   HENT:      Head: Normocephalic and atraumatic.      Mouth/Throat:      Pharynx: No oropharyngeal exudate.   Eyes:      General: No scleral icterus.        Right eye: No discharge.         Left eye: No discharge.      Conjunctiva/sclera: Conjunctivae normal.      Pupils: Pupils are equal, round, and reactive to light.   Neck:      Vascular: No JVD.      Trachea: No tracheal deviation.   Cardiovascular:      Rate and Rhythm: Regular rhythm. Tachycardia present.      Heart sounds: No murmur heard.    No friction rub. No gallop.   Pulmonary:      Effort: Pulmonary effort is normal. No respiratory distress.      Breath sounds: No stridor. Rales present. No wheezing.   Chest:      Chest wall: No tenderness.   Abdominal:      General: Bowel sounds are normal. There is no distension.      Palpations: Abdomen is soft.      Tenderness: There is no abdominal tenderness. There is no rebound.      Hernia: A hernia (Right inguinal) is present.   Musculoskeletal:         General: Swelling present. No tenderness.      Cervical back: Neck supple.      Right lower leg: Edema present.      Left lower leg: Edema present.   Skin:     General: Skin is warm and dry.      Nails: There is no clubbing.   Neurological:      Mental Status: He is alert and oriented to person, place, and time.      Cranial Nerves: No cranial nerve deficit.      Motor: No abnormal muscle tone.   Psychiatric:         Behavior: Behavior normal.         Fluids    Intake/Output Summary (Last 24 hours) at 4/5/2022 1342  Last data filed at 4/5/2022 1237  Gross per 24 hour   Intake  1200 ml   Output 3275 ml   Net -2075 ml       Laboratory  Recent Labs     04/03/22  0122 04/04/22  0111 04/05/22  0110   WBC 8.9 9.1 8.4   RBC 3.72* 3.64* 3.94*   HEMOGLOBIN 9.5* 9.3* 10.1*   HEMATOCRIT 30.6* 29.0* 31.3*   MCV 82.3 79.7* 79.4*   MCH 25.5* 25.5* 25.6*   MCHC 31.0* 32.1* 32.3*   RDW 49.6 47.7 47.6   PLATELETCT 214 206 234   MPV 10.0 10.1 10.0     Recent Labs     04/03/22  0122 04/04/22  0111 04/05/22  0110   SODIUM 137 134* 136   POTASSIUM 4.0 3.9 3.7   CHLORIDE 103 95* 94*   CO2 25 28 33   GLUCOSE 119* 89 48*   BUN 19 25* 21   CREATININE 1.24 1.49* 1.28   CALCIUM 9.2 9.7 10.1                   Imaging  EC-ECHOCARDIOGRAM COMPLETE W/O CONT   Final Result      US-EXTREMITY VENOUS LOWER UNILAT RIGHT   Final Result      CT-ABDOMEN-PELVIS WITH   Final Result      1.  Diffuse anasarca. Small pleural effusions, mild ascites and body wall and scrotal edema.   2.  Large right inguinal hernia containing a loop of colon. No bowel obstruction.   3.  No definite evidence metastatic disease.   4.  Cardiomegaly.   5.  Nonspecific right iliacus fluid collection measuring 4.1 x 2.9 cm.      DX-CHEST-PORTABLE (1 VIEW)   Final Result      Cardiomegaly with mild interstitial edema consistent with CHF. Possible trace pleural effusions.      CL-LEFT HEART CATHETERIZATION WITH POSSIBLE INTERVENTION    (Results Pending)        Assessment/Plan  * Anasarca- (present on admission)  Assessment & Plan  Suspect acute CHF    Patient will be admitted and started on IV Lasix  We will check echocardiogram  Continue losartan further work-up depending on echocardiogram results      Acute systolic heart failure (HCC)  Assessment & Plan  Patient with newly diagnosed heart failure with reduced ejection fraction approximately 20%  Plan:  1.  Aggressive diuresis to reach euvolemic state  2.  Ischemic work-up in the form of cardiac catheterization patient has significant risk factors the form of insulin-dependent diabetes.  3.  ACE  inhibitor/ARB or Arni as appropriate beta-blocker when euvolemic high intensity statin spironolactone.    Follow-up with cardiology recommendations.    Elevated troponin  Assessment & Plan  Likely demand ischemia  Recheck troponin follow-up on echocardiogram    Tobacco dependence  Assessment & Plan  Counseled on cessation    Pelvic fluid collection  Assessment & Plan  Reviewed and discussed CT findings with Dr. Pavon from interventional radiology   Dr Pavon has low threshold to drain fluid collection if any signs of infection, patient currently has no clinical signs(afebrile with no leukocytosis) and he would benefit from further stabilization of his anasarca and suspected CHF prior to any invasive procedures  Dr. Pavon recommended ensuring repeat CT in 1 week to follow-up     Right inguinal hernia  Assessment & Plan  Will need surgery evaluation for elective repair    Leg edema  Assessment & Plan  Follow-up on pending venous duplex    Congestive heart failure of unknown etiology (HCC)- (present on admission)  Assessment & Plan  Start diuresis and check echocardiogram    Anemia- (present on admission)  Assessment & Plan  Chronic anemia  Patient reporting a 10-day history of GI bleed  Denied GI work-up with EGD or colonoscopy  Hemoglobin has remained stable and above 9 throughout hospital stay\bleed were to be present likely slow but not ruled out  Iron studies suggestive of mild iron deficiency anemia  IV Iron per pharmacy  Pending occult blood, start oral PPI for now  Labs on AM    Essential hypertension- (present on admission)  Assessment & Plan  Continue losartan  Monitor blood pressure    Diabetic polyneuropathy associated with type 2 diabetes mellitus (HCC)- (present on admission)  Assessment & Plan  Continue Lantus  Insulin sliding scale monitor CBGs  Most recent hemoglobin A1c 7.0    Dyslipidemia- (present on admission)  Assessment & Plan  Continue simvastatin       VTE prophylaxis: SCDs/TEDs, therapeutic  anticoagulation with Xarelto and pharmacologic prophylaxis contraindicated due to Not applicable    I have performed a physical exam and reviewed and updated ROS and Plan today (4/5/2022). In review of yesterday's note (4/4/2022), there are no changes except as documented above.

## 2022-04-05 NOTE — PROGRESS NOTES
Received bedside report from RN, pt care assumed, VSS, pt assessment complete. Pt AAOx4, with no complaint of pain at this time. No signs of acute distress noted at this time. Plan of care discussed with pt and verbalizes no questions. Pt denies any additional needs at this time. Bed locked/in lowest position, pt educated on fall risk and verbalized understanding, call light within reach, hourly rounding initiated.   Patient's BG 72, orange juice provided, instructed to at all of breakfast.

## 2022-04-06 ENCOUNTER — APPOINTMENT (OUTPATIENT)
Dept: MEDICAL GROUP | Facility: PHYSICIAN GROUP | Age: 67
End: 2022-04-06
Payer: COMMERCIAL

## 2022-04-06 LAB
ANION GAP SERPL CALC-SCNC: 11 MMOL/L (ref 7–16)
BASOPHILS # BLD AUTO: 0.4 % (ref 0–1.8)
BASOPHILS # BLD: 0.04 K/UL (ref 0–0.12)
BUN SERPL-MCNC: 21 MG/DL (ref 8–22)
CALCIUM SERPL-MCNC: 10.6 MG/DL (ref 8.5–10.5)
CHLORIDE SERPL-SCNC: 94 MMOL/L (ref 96–112)
CO2 SERPL-SCNC: 31 MMOL/L (ref 20–33)
CREAT SERPL-MCNC: 1.22 MG/DL (ref 0.5–1.4)
EOSINOPHIL # BLD AUTO: 0.94 K/UL (ref 0–0.51)
EOSINOPHIL NFR BLD: 10.5 % (ref 0–6.9)
ERYTHROCYTE [DISTWIDTH] IN BLOOD BY AUTOMATED COUNT: 46.7 FL (ref 35.9–50)
GFR SERPLBLD CREATININE-BSD FMLA CKD-EPI: 64 ML/MIN/1.73 M 2
GLUCOSE BLD STRIP.AUTO-MCNC: 103 MG/DL (ref 65–99)
GLUCOSE BLD STRIP.AUTO-MCNC: 118 MG/DL (ref 65–99)
GLUCOSE BLD STRIP.AUTO-MCNC: 227 MG/DL (ref 65–99)
GLUCOSE BLD STRIP.AUTO-MCNC: 234 MG/DL (ref 65–99)
GLUCOSE BLD STRIP.AUTO-MCNC: 251 MG/DL (ref 65–99)
GLUCOSE SERPL-MCNC: 63 MG/DL (ref 65–99)
HCT VFR BLD AUTO: 32.8 % (ref 42–52)
HGB BLD-MCNC: 10.6 G/DL (ref 14–18)
IMM GRANULOCYTES # BLD AUTO: 0.02 K/UL (ref 0–0.11)
IMM GRANULOCYTES NFR BLD AUTO: 0.2 % (ref 0–0.9)
LYMPHOCYTES # BLD AUTO: 3.16 K/UL (ref 1–4.8)
LYMPHOCYTES NFR BLD: 35.2 % (ref 22–41)
MCH RBC QN AUTO: 25.7 PG (ref 27–33)
MCHC RBC AUTO-ENTMCNC: 32.3 G/DL (ref 33.7–35.3)
MCV RBC AUTO: 79.6 FL (ref 81.4–97.8)
MONOCYTES # BLD AUTO: 0.92 K/UL (ref 0–0.85)
MONOCYTES NFR BLD AUTO: 10.2 % (ref 0–13.4)
NEUTROPHILS # BLD AUTO: 3.9 K/UL (ref 1.82–7.42)
NEUTROPHILS NFR BLD: 43.5 % (ref 44–72)
NRBC # BLD AUTO: 0.02 K/UL
NRBC BLD-RTO: 0.2 /100 WBC
PLATELET # BLD AUTO: 242 K/UL (ref 164–446)
PMV BLD AUTO: 10 FL (ref 9–12.9)
POTASSIUM SERPL-SCNC: 4 MMOL/L (ref 3.6–5.5)
RBC # BLD AUTO: 4.12 M/UL (ref 4.7–6.1)
SODIUM SERPL-SCNC: 136 MMOL/L (ref 135–145)
WBC # BLD AUTO: 9 K/UL (ref 4.8–10.8)

## 2022-04-06 PROCEDURE — A9270 NON-COVERED ITEM OR SERVICE: HCPCS | Performed by: INTERNAL MEDICINE

## 2022-04-06 PROCEDURE — 82962 GLUCOSE BLOOD TEST: CPT | Mod: 91

## 2022-04-06 PROCEDURE — 700105 HCHG RX REV CODE 258: Performed by: STUDENT IN AN ORGANIZED HEALTH CARE EDUCATION/TRAINING PROGRAM

## 2022-04-06 PROCEDURE — 99232 SBSQ HOSP IP/OBS MODERATE 35: CPT | Performed by: INTERNAL MEDICINE

## 2022-04-06 PROCEDURE — 700102 HCHG RX REV CODE 250 W/ 637 OVERRIDE(OP): Performed by: STUDENT IN AN ORGANIZED HEALTH CARE EDUCATION/TRAINING PROGRAM

## 2022-04-06 PROCEDURE — 700111 HCHG RX REV CODE 636 W/ 250 OVERRIDE (IP): Performed by: STUDENT IN AN ORGANIZED HEALTH CARE EDUCATION/TRAINING PROGRAM

## 2022-04-06 PROCEDURE — 80048 BASIC METABOLIC PNL TOTAL CA: CPT

## 2022-04-06 PROCEDURE — 700102 HCHG RX REV CODE 250 W/ 637 OVERRIDE(OP): Performed by: INTERNAL MEDICINE

## 2022-04-06 PROCEDURE — 700102 HCHG RX REV CODE 250 W/ 637 OVERRIDE(OP): Performed by: HOSPITALIST

## 2022-04-06 PROCEDURE — A9270 NON-COVERED ITEM OR SERVICE: HCPCS | Performed by: STUDENT IN AN ORGANIZED HEALTH CARE EDUCATION/TRAINING PROGRAM

## 2022-04-06 PROCEDURE — 97165 OT EVAL LOW COMPLEX 30 MIN: CPT

## 2022-04-06 PROCEDURE — 36415 COLL VENOUS BLD VENIPUNCTURE: CPT

## 2022-04-06 PROCEDURE — A9270 NON-COVERED ITEM OR SERVICE: HCPCS | Performed by: HOSPITALIST

## 2022-04-06 PROCEDURE — 85025 COMPLETE CBC W/AUTO DIFF WBC: CPT

## 2022-04-06 PROCEDURE — 770020 HCHG ROOM/CARE - TELE (206)

## 2022-04-06 RX ORDER — FUROSEMIDE 20 MG/1
20 TABLET ORAL
Status: DISCONTINUED | OUTPATIENT
Start: 2022-04-07 | End: 2022-04-08 | Stop reason: HOSPADM

## 2022-04-06 RX ADMIN — ROSUVASTATIN CALCIUM 40 MG: 20 TABLET, FILM COATED ORAL at 17:45

## 2022-04-06 RX ADMIN — INSULIN HUMAN 3 UNITS: 100 INJECTION, SOLUTION PARENTERAL at 21:22

## 2022-04-06 RX ADMIN — INSULIN HUMAN 3 UNITS: 100 INJECTION, SOLUTION PARENTERAL at 17:47

## 2022-04-06 RX ADMIN — METOPROLOL TARTRATE 12.5 MG: 25 TABLET, FILM COATED ORAL at 06:14

## 2022-04-06 RX ADMIN — ASPIRIN 81 MG: 81 TABLET, COATED ORAL at 13:47

## 2022-04-06 RX ADMIN — POTASSIUM CHLORIDE 20 MEQ: 20 TABLET, EXTENDED RELEASE ORAL at 06:14

## 2022-04-06 RX ADMIN — FUROSEMIDE 40 MG: 40 TABLET ORAL at 06:14

## 2022-04-06 RX ADMIN — LOSARTAN POTASSIUM 25 MG: 25 TABLET, FILM COATED ORAL at 06:14

## 2022-04-06 RX ADMIN — INSULIN GLARGINE 15 UNITS: 100 INJECTION, SOLUTION SUBCUTANEOUS at 17:49

## 2022-04-06 RX ADMIN — SODIUM CHLORIDE 125 MG: 9 INJECTION, SOLUTION INTRAVENOUS at 17:51

## 2022-04-06 RX ADMIN — Medication 2000 UNITS: at 06:14

## 2022-04-06 RX ADMIN — METOPROLOL TARTRATE 12.5 MG: 25 TABLET, FILM COATED ORAL at 13:47

## 2022-04-06 ASSESSMENT — ENCOUNTER SYMPTOMS
SHORTNESS OF BREATH: 0
EYE DISCHARGE: 0
NERVOUS/ANXIOUS: 0
WEAKNESS: 0
DIAPHORESIS: 0
SORE THROAT: 0
WHEEZING: 0
HEADACHES: 0
BLOOD IN STOOL: 0
DIARRHEA: 0
SPUTUM PRODUCTION: 0
NECK PAIN: 0
WEIGHT LOSS: 0
NAUSEA: 0
MYALGIAS: 0
DIZZINESS: 0
CHEST TIGHTNESS: 0
EYE REDNESS: 0
BACK PAIN: 0
FEVER: 0
SHORTNESS OF BREATH: 1
STRIDOR: 0
INSOMNIA: 0
POLYDIPSIA: 0
DOUBLE VISION: 0
SEIZURES: 0
EYE PAIN: 0
COUGH: 0
VOMITING: 0
CHILLS: 0
MEMORY LOSS: 0
PALPITATIONS: 0
TINGLING: 0
SINUS PAIN: 0
BRUISES/BLEEDS EASILY: 0
FLANK PAIN: 0

## 2022-04-06 ASSESSMENT — COGNITIVE AND FUNCTIONAL STATUS - GENERAL
DRESSING REGULAR LOWER BODY CLOTHING: A LITTLE
HELP NEEDED FOR BATHING: A LITTLE
SUGGESTED CMS G CODE MODIFIER DAILY ACTIVITY: CJ
DAILY ACTIVITIY SCORE: 22

## 2022-04-06 ASSESSMENT — FIBROSIS 4 INDEX
FIB4 SCORE: 1.51

## 2022-04-06 ASSESSMENT — PAIN DESCRIPTION - PAIN TYPE
TYPE: ACUTE PAIN
TYPE: ACUTE PAIN

## 2022-04-06 ASSESSMENT — ACTIVITIES OF DAILY LIVING (ADL): TOILETING: INDEPENDENT

## 2022-04-06 NOTE — DISCHARGE PLANNING
Anticipated Discharge Disposition: Home when clear    Action: Patient discussed during IDT rounds.  Patient is pending cardiology clearance.  Patient may need home O2 arranged prior to discharge.    Barriers to Discharge: Medical clearance     Plan: Case coordination to continue to follow up with medical team to discuss discharge barriers.

## 2022-04-06 NOTE — PROGRESS NOTES
[facility] Medicine Daily Progress Note    Date of Service  4/6/2022    Chief Complaint  Chucky Keith is a 66 y.o. male admitted 4/2/2022 with shortness of breath     Hospital Course  Chucky Keith is a 66 y.o. male who presented 4/2/2022 with generalized edema and right scrotal swelling.  He reports that over the past month he has noted increasing swelling and over the past few days he has noted progressive dyspnea on exertion associated with orthopnea.  He denies any fever or chills.  He became concerned about the increasing scrotal swelling that he presented to the emergency department.  He has a history of diabetes dyslipidemia and hypertension.  He denies any known history of cardiac disease.  No trauma.  No fever no chills.  His diabetes has been controlled on Lantus.      Interval Problem Update  4/3/2022:  Patient continuing with aggressive diuresis patient still has approximately 1-2+ pitting edema up to the mid lower leg bilaterally.  No acute events overnight patient endorses improvement of breathing.  Offers no other concerns at this time.  Plan is to continue with aggressive diuresis when appropriate transition to beta-blocker upon reaching euvolemia continue with losartan echocardiogram noted patient also require spironolactone ejection fraction of approximately 20%.    4/4/2022:  Patient had echocardiogram performed in the afternoon of 4/3/2022 which showed a significantly reduced ejection fraction approximately 20% patient insulin-dependent diabetic I consulted cardiology because patient likely would benefit from ischemic evaluation the form of cardiac catheterization.  Patient did have increasing creatinine today I have held Lasix that present in addition to metolazone.  May consider nephrology consult following outcome of cardiac catheterization.    4/5/2022  Patient evaluated bedside, patient no acute distress eating meal, happy.  Oxygen titrated down to 1 L nasal cannula.  Hemoglobin stable and has remained  above 9 throughout hospital stay.  Patient reporting 10-day history of melena but none noted by staff.  Patient denies history of anemia or EGD/colonoscopy in the past.  Iron studies suggestive of mild iron deficiency anemia, IV iron per pharmacy, avoid false positive FOBT.  Pending FOBT.  Cardiology following and holding off on initiating antiplatelet therapy in the setting of possible GI bleed.  Cardiology planning for PCI in near future, appreciate recommendations. Start oral PPI.  Continue monitoring. Labs on a.m.    4/6: Patient seen and examined, afebrile, no overnight events, afebrile, no nausea or vomiting. Cardiology following appreciate rec. His occult blood stool is negative hemoglobin stable.  Cardiology starting back on aspirin     I have personally seen and examined the patient at bedside. I discussed the plan of care with patient, bedside RN and cardiology.    Consultants/Specialty  cardiology    Code Status  Full Code    Disposition  Patient is not medically cleared for discharge.   Anticipate discharge to to home with close outpatient follow-up.  I have placed the appropriate orders for post-discharge needs.    Review of Systems  Review of Systems   Constitutional: Negative for chills, diaphoresis, fever, malaise/fatigue and weight loss.   HENT: Negative for congestion, nosebleeds, sinus pain and sore throat.    Eyes: Negative for double vision, pain, discharge and redness.   Respiratory: Positive for shortness of breath. Negative for cough, sputum production, wheezing and stridor.    Cardiovascular: Positive for leg swelling. Negative for chest pain and palpitations.   Gastrointestinal: Negative for blood in stool, diarrhea, nausea and vomiting.   Genitourinary: Negative for flank pain, frequency and urgency.   Musculoskeletal: Negative for back pain, joint pain, myalgias and neck pain.   Skin: Negative for itching and rash.   Neurological: Negative for dizziness, tingling, seizures, weakness and  headaches.   Endo/Heme/Allergies: Negative for polydipsia. Does not bruise/bleed easily.   Psychiatric/Behavioral: Negative for memory loss. The patient is not nervous/anxious and does not have insomnia.         Physical Exam  Temp:  [36.3 °C (97.3 °F)-37.1 °C (98.7 °F)] 36.9 °C (98.4 °F)  Pulse:  [] 77  Resp:  [16-18] 18  BP: ()/(53-64) 103/62  SpO2:  [92 %-98 %] 98 %    Physical Exam  Vitals and nursing note reviewed.   Constitutional:       General: He is not in acute distress.     Appearance: Normal appearance. He is well-developed. He is obese. He is not ill-appearing or diaphoretic.   HENT:      Head: Normocephalic and atraumatic.      Mouth/Throat:      Pharynx: No oropharyngeal exudate.   Eyes:      General: No scleral icterus.        Right eye: No discharge.         Left eye: No discharge.      Conjunctiva/sclera: Conjunctivae normal.      Pupils: Pupils are equal, round, and reactive to light.   Neck:      Vascular: No JVD.      Trachea: No tracheal deviation.   Cardiovascular:      Rate and Rhythm: Regular rhythm. Tachycardia present.      Heart sounds: No murmur heard.    No friction rub. No gallop.   Pulmonary:      Effort: Pulmonary effort is normal. No respiratory distress.      Breath sounds: No stridor. Rales present. No wheezing.   Chest:      Chest wall: No tenderness.   Abdominal:      General: Bowel sounds are normal. There is no distension.      Palpations: Abdomen is soft.      Tenderness: There is no abdominal tenderness. There is no rebound.      Hernia: A hernia (Right inguinal) is present.   Musculoskeletal:         General: Swelling present. No tenderness.      Cervical back: Neck supple.      Right lower leg: Edema present.      Left lower leg: Edema present.   Skin:     General: Skin is warm and dry.      Nails: There is no clubbing.   Neurological:      Mental Status: He is alert and oriented to person, place, and time.      Cranial Nerves: No cranial nerve deficit.       Motor: No abnormal muscle tone.   Psychiatric:         Behavior: Behavior normal.         Fluids    Intake/Output Summary (Last 24 hours) at 4/6/2022 1219  Last data filed at 4/6/2022 0843  Gross per 24 hour   Intake 840 ml   Output 2600 ml   Net -1760 ml       Laboratory  Recent Labs     04/04/22  0111 04/05/22  0110 04/06/22  0108   WBC 9.1 8.4 9.0   RBC 3.64* 3.94* 4.12*   HEMOGLOBIN 9.3* 10.1* 10.6*   HEMATOCRIT 29.0* 31.3* 32.8*   MCV 79.7* 79.4* 79.6*   MCH 25.5* 25.6* 25.7*   MCHC 32.1* 32.3* 32.3*   RDW 47.7 47.6 46.7   PLATELETCT 206 234 242   MPV 10.1 10.0 10.0     Recent Labs     04/04/22  0111 04/05/22  0110 04/06/22  0108   SODIUM 134* 136 136   POTASSIUM 3.9 3.7 4.0   CHLORIDE 95* 94* 94*   CO2 28 33 31   GLUCOSE 89 48* 63*   BUN 25* 21 21   CREATININE 1.49* 1.28 1.22   CALCIUM 9.7 10.1 10.6*                   Imaging  EC-ECHOCARDIOGRAM COMPLETE W/O CONT   Final Result      US-EXTREMITY VENOUS LOWER UNILAT RIGHT   Final Result      CT-ABDOMEN-PELVIS WITH   Final Result      1.  Diffuse anasarca. Small pleural effusions, mild ascites and body wall and scrotal edema.   2.  Large right inguinal hernia containing a loop of colon. No bowel obstruction.   3.  No definite evidence metastatic disease.   4.  Cardiomegaly.   5.  Nonspecific right iliacus fluid collection measuring 4.1 x 2.9 cm.      DX-CHEST-PORTABLE (1 VIEW)   Final Result      Cardiomegaly with mild interstitial edema consistent with CHF. Possible trace pleural effusions.      CL-LEFT HEART CATHETERIZATION WITH POSSIBLE INTERVENTION    (Results Pending)        Assessment/Plan  * Anasarca- (present on admission)  Assessment & Plan  Echo showing EF of 20 %  on IV Lasix  Cardiology following appreciate rec   Had cardiac cath done on 4/4     Acute systolic heart failure (HCC)  Assessment & Plan  Patient with newly diagnosed heart failure with reduced ejection fraction approximately 20%  Plan:  1.  Aggressive diuresis to reach euvolemic state  2.   Ischemic work-up in the form of cardiac catheterization patient has significant risk factors the form of insulin-dependent diabetes.  3.  ACE inhibitor/ARB or Arni as appropriate beta-blocker when euvolemic high intensity statin spironolactone.    Follow-up with cardiology recommendations.    Elevated troponin  Assessment & Plan  Likely demand ischemia  Recheck troponin follow-up on echocardiogram    Tobacco dependence  Assessment & Plan  Counseled on cessation    Pelvic fluid collection  Assessment & Plan  Reviewed and discussed CT findings with Dr. Pavon from interventional radiology   Dr Pavon has low threshold to drain fluid collection if any signs of infection, patient currently has no clinical signs(afebrile with no leukocytosis) and he would benefit from further stabilization of his anasarca and suspected CHF prior to any invasive procedures  Dr. Pavon recommended ensuring repeat CT in 1 week to follow-up     Right inguinal hernia  Assessment & Plan  Will need surgery evaluation for elective repair    Leg edema  Assessment & Plan  Follow-up on pending venous duplex    Congestive heart failure of unknown etiology (HCC)- (present on admission)  Assessment & Plan  Start diuresis and check echocardiogram    Anemia- (present on admission)  Assessment & Plan  Chronic anemia  Patient reporting a 10-day history of GI bleed  Denied GI work-up with EGD or colonoscopy  Hemoglobin has remained stable and above 9 throughout hospital stay\bleed were to be present likely slow but not ruled out  Iron studies suggestive of mild iron deficiency anemia  IV Iron per pharmacy  Occult blood negative    Essential hypertension- (present on admission)  Assessment & Plan  Continue losartan  Monitor blood pressure    Diabetic polyneuropathy associated with type 2 diabetes mellitus (HCC)- (present on admission)  Assessment & Plan  Continue Lantus  Insulin sliding scale monitor CBGs  Most recent hemoglobin A1c 7.0    Dyslipidemia-  (present on admission)  Assessment & Plan  Continue simvastatin       VTE prophylaxis: SCDs/TEDs    I have performed a physical exam and reviewed and updated ROS and Plan today (4/6/2022). In review of yesterday's note (4/5/2022), there are no changes except as documented above.

## 2022-04-06 NOTE — PROGRESS NOTES
Dr. Rodriguez notified that so far occult stools have been negative. Also notified that patient's SBP has dropped into high 90s, patient is asymptomatic.

## 2022-04-06 NOTE — PROGRESS NOTES
Report received, pt care assumed, tele box on and rate verified. VSS and pt is on 1 L O2 via NC. Pt aaox4, no signs of distress noted at this time. POC discussed with pt and verbalizes no questions. Pt c/o of no pain at this time. Pt denies any additional needs at this time. Bed in lowest position, bed alarm on, pt educated on fall risk and verbalized understanding, call light within reach, will continue with plan of care.

## 2022-04-06 NOTE — THERAPY
Physical Therapy Contact Note    Patient Name: Pac Keith  Age:  66 y.o., Sex:  male  Medical Record #: [id number]  Today's Date: 4/6/2022 04/06/22 1244   Interdisciplinary Plan of Care Collaboration   IDT Collaboration with  Nursing;Other (See Comments)  (EMR)   Collaboration Comments PT consult received.  Per EMR, pt pending cardiac POC and possible PCI pending discussion w/ pt's family members.  Will monitor and follow when POC is established for most accurate assessment and edu.   Session Information   Date / Session Number  4/6- pend cardiac POC (eval)       Matt SANDOVAL, PT, DPT [id number]

## 2022-04-06 NOTE — PROGRESS NOTES
Bedside report received. Assumed care of patient this morning. Assessment completed      Patient is A&O x 4, Educated patient to call for assistance appropriately  Reports 0 /10 pain  Pt is on 1L oxygen, baseline is room air.   Mobility 1x Bed alarm in use.  Voiding + strict I&O  Flatus +  IPAD  used for assessment    Plan of care reviewed with the patient. Bed is locked and in the lowest position. Call light is within reach. Patient encouraged to voice needs and concerns, all needs met at this time. Hourly rounding in place.

## 2022-04-06 NOTE — PROGRESS NOTES
Cardiology Follow Up Progress Note    Date of Service  4/6/2022    Attending Physician  Thomas Patel*    HPI  Pac Keith is a 66 y.o. male admitted 4/2/2022 with newly diagnosed acute HFrEF 20% with prior history of hypertension, dyslipidemia, DM and chronic tobacco use with coronary angiography 4/2/2022 showing CAD involving the LAD and RCA, LAD PCI and antiplatelet therapy deferred until iron deficiency anemia defined and evaluation complete.    Interim Events  4/5: /65.  HR 90.  Sinus.  No cardiac symptoms.  No cardiac events.  4/6: /62 HR 81.  UO 3750 cc no events overnight per RN at the bedside this a.m.  Does not speak English but indicates that he feels fine, ambulating without trouble.    Review of Systems  Review of Systems   Respiratory: Negative for chest tightness and shortness of breath.    Cardiovascular: Negative for palpitations.   Gastrointestinal: Negative for nausea.   Neurological: Negative for dizziness.       Vital signs in last 24 hours  Temp:  [36.3 °C (97.3 °F)-37.1 °C (98.7 °F)] 37.1 °C (98.7 °F)  Pulse:  [] 81  Resp:  [16-18] 18  BP: ()/(53-64) 103/62  SpO2:  [92 %-98 %] 93 %    Physical Exam  Physical Exam  Constitutional:       General: He is not in acute distress.     Comments: Frail.   HENT:      Head: Normocephalic.   Eyes:      General: No scleral icterus.     Extraocular Movements: Extraocular movements intact.   Cardiovascular:      Rate and Rhythm: Normal rate and regular rhythm.      Heart sounds: Normal heart sounds, S1 normal and S2 normal. No murmur heard.    No friction rub. No gallop.   Pulmonary:      Effort: Pulmonary effort is normal.      Breath sounds: Normal breath sounds. No wheezing, rhonchi or rales.   Musculoskeletal:      Right lower leg: No edema.      Left lower leg: No edema.      Comments: Diffuse atrophy   Skin:     General: Skin is warm and dry.   Neurological:      Mental Status: He is alert and oriented to person, place,  and time.   Psychiatric:         Behavior: Behavior normal.         Lab Review  Lab Results   Component Value Date/Time    WBC 9.0 04/06/2022 01:08 AM    RBC 4.12 (L) 04/06/2022 01:08 AM    HEMOGLOBIN 10.6 (L) 04/06/2022 01:08 AM    HEMATOCRIT 32.8 (L) 04/06/2022 01:08 AM    MCV 79.6 (L) 04/06/2022 01:08 AM    MCH 25.7 (L) 04/06/2022 01:08 AM    MCHC 32.3 (L) 04/06/2022 01:08 AM    MPV 10.0 04/06/2022 01:08 AM      Lab Results   Component Value Date/Time    SODIUM 136 04/06/2022 01:08 AM    POTASSIUM 4.0 04/06/2022 01:08 AM    CHLORIDE 94 (L) 04/06/2022 01:08 AM    CO2 31 04/06/2022 01:08 AM    GLUCOSE 63 (L) 04/06/2022 01:08 AM    BUN 21 04/06/2022 01:08 AM    CREATININE 1.22 04/06/2022 01:08 AM    CREATININE 0.9 12/29/2005 04:08 PM      Lab Results   Component Value Date/Time    ASTSGOT 30 04/02/2022 11:31 AM    ALTSGPT 31 04/02/2022 11:31 AM     Lab Results   Component Value Date/Time    CHOLSTRLTOT 91 (L) 03/28/2022 12:21 PM    LDL 48 03/28/2022 12:21 PM    HDL 34 (A) 03/28/2022 12:21 PM    TRIGLYCERIDE 46 03/28/2022 12:21 PM    TROPONINT 25 (H) 04/02/2022 05:04 PM       No results for input(s): NTPROBNP in the last 72 hours.    Cardiac Imaging and Procedures Review  EKG:  My personal interpretation of the EKG dated/2/2022 is sinus tachycardia, rate 102, nonspecific ST-T changes    Rhythm: My personal interpretation of the rhythm dated 4/6/2022 is sinus rhythm.    Echocardiogram: 4/3/2022  Severely reduced left ventricular systolic function.  Global hypokinesis, relatively preserved wall motion laterally.  Grade III diastolic dysfunction.  Dilated IVC without inspiratory collapse.  Reduced right ventricular systolic function.  Moderate tricuspid regurgitation.  Estimated right ventricular systolic pressure is 62  mmHg.  Incidental note of right atrial mass - most consistent with a prominent   eustachian valve (normal anatomic variation); correlate clinically.    Cardiac Catheterization: 4/4/2022  1.  Left main  coronary artery:  Normal.  2.  Left anterior descending artery: Mid LAD has 80% calcific stenosis after first diagonal takeoff.  First diagonal has luminal irregularities.  Second diagonal is smaller branch, has 70% stenosis in proximal portion  3.  Left circumflex coronary artery: Proximal, midportion, large first marginal free of significant disease.  Distal AV groove branch has moderate stenosis.  4.  Right coronary artery: 60-70% stenosis in proximal portion.  Diffuse 60% stenosis in midportion.  Right posterolateral branch is diffusely diseased with a focal 70% stenosis in the midportion .  This is a right dominant system.  5.  Left ventricular end diastolic pressure:  19 mmHg.  No signficant gradient across the aortic valve.       Imaging  Chest X-Ray: 4/2/2022  Cardiomegaly with mild interstitial edema consistent with CHF. Possible trace pleural effusions.     Assessment  1.  HFrEF 20%.  2.  Cardiomyopathy suspected ischemic.  3.  CAD, LAD bifurcation and RCA.  4.  Hypertension.  5.  Dyslipidemia.  6.  Diabetes mellitus  7.  Chronic tobacco use.    Recommendation Discussion  1.  HFrEF, ICM: Appears euvolemic with UO of 3750 cc, adjust furosemide, continue losartan, metoprolol, add spironolactone if renal function remains stable and improving, eventually start empagliflozin.  2.  CAD: Continue atorvastatin, off antiplatelet therapy due to anemia, but H&H appears stable, stool OB negative x2, will restart aspirin and will discuss PCI with patient via family to interpret.  3.  Hypertension: BP normal, at goal, continue losartan.  4.  Dyslipidemia: Continue atorvastatin, LDL 48 at goal.  5.  FELICITA: Still slightly improving.  6.  Anemia: Work-up per primary team.  7.  I spoke with the patient's daughter Yen updated about her father's cardiac situation and who will be coming in today and orders so that we can review her father's cardiac condition and discuss PCI.    Thank you for allowing me to participate in the  care of this patient.    Please contact me with any questions.    Minh Rodriguez M.D.   Cardiologist, [facility]  [phone]

## 2022-04-06 NOTE — THERAPY
Occupational Therapy   Initial Evaluation     Patient Name: Chucky Keith  Age:  66 y.o., Sex:  male  Medical Record #: [id number]  Today's Date: 4/6/2022     Precautions  Precautions: (P) Fall Risk    Assessment  Patient is 66 y.o. male presenting with supervision for ADL and SBA/supervision for functional mobility in room/restroom. Patient educated on pacing and energy conservation techniques. Patient doesn't require further OT at this time.        Plan    Recommend Occupational Therapy for Evaluation only        Discharge Recommendations: (P)  (Anticiapte home with family once medically cleared)   Recommendation: PT eval for balance/distant ambulation assessment    Subjective    Patient alert and cooperative during session. Nurse truman'sangeeta occupational therapist to work with patient.       REY #[id number] (Cantonese)     Objective       04/06/22 0840   Total Time Spent   Total Time Spent (Mins) 25   Charge Group   OT Evaluation OT Evaluation Low   Initial Contact Note    Initial Contact Note Order Received and Verified, Occupational Therapy Evaluation in Progress with Full Report to Follow.   Prior Living Situation   Prior Services Home-Independent   Housing / Facility 1 Story House   Comments Patient lives with spouse, son and daugther, 1 story home, 1 step. Patient doesn't use ambulatory devices at baseline.  Walk in shower, independent with dressing and showering.   Prior Level of ADL Function   Self Feeding Independent   Grooming / Hygiene Independent   Bathing Independent   Dressing Independent   Toileting Independent   History of Falls   History of Falls No   Precautions   Precautions Fall Risk   Vitals   O2 (LPM)   (1 L O2 donned upon arrival, trialed room air for ambulating in room O2 saturation 91-93% upon return to bed level.)   Pain 0 - 10 Group   Therapist Pain Assessment   (0/10 pain per patient, Groin visually swollen)   Passive ROM Upper Body   Passive ROM Upper Body WDL   Active ROM Upper Body   Active  ROM Upper Body  WDL   Strength Upper Body   Comments >/=3+/5 during functional mobility   Sensation Upper Body   Comments BUE light touh sensation WFL   Upper Body Muscle Tone   Upper Body Muscle Tone  WDL   Coordination Upper Body   Coordination   (BUE fine/gross motor coordination WFL)   Bed Mobility    Supine to Sit Independent   Sit to Supine Independent   Scooting Independent   Rolling Independent   ADL Assessment   Grooming   (Supervision standing sink side to wash face and hands, supervision for standing balance.)   Upper Body Dressing Supervision  (Donning gown)   Lower Body Dressing Supervision  (Donning socks sitting EOB)   How much help from another person does the patient currently need...   Putting on and taking off regular lower body clothing? 3   Bathing (including washing, rinsing, and drying)? 3   Toileting, which includes using a toilet, bedpan, or urinal? 4   Putting on and taking off regular upper body clothing? 4   Taking care of personal grooming such as brushing teeth? 4   Eating meals? 4   6 Clicks Daily Activity Score 22   Functional Mobility   Sit to Stand Supervised  (Initially poster leaning on bed, improved with time)   Comments Patient ambulated in room with SBA/supervision, instances of lateral sway but able to self correct. Stood sink side to perform simple grooming task.  Patient ambulated without device in room/restroom. WOULD BENEFIT FROM PT EVAL TO ASSESS HIGHER LEVEL BALANCE WITH INCREASED DISTANCE   Visual Perception   Visual Perception  WDL   Activity Tolerance   Comments Ambulated in room/restroom no complaints of dizziness or lightheadiness on room air. 1L O2 donned when back in bed   Education Group   Additional Comments Educated on pacing and energy conservation techniques during functional mobility.   Anticipated Discharge Equipment and Recommendations   Discharge Recommendations   (Anticiapte home with family once medically cleared)   Interdisciplinary Plan of Care  Collaboration   Collaboration Comments SBAR nsg functional mobility and ADl status   Session Information   Date / Session Number  OT EVAL complete, OT EVAL ONLY

## 2022-04-06 NOTE — CARE PLAN
The patient is Watcher - Medium risk of patient condition declining or worsening    Shift Goals  Clinical Goals: monitor cardiac symptoms, strict I&O wean off O2  Patient Goals: rest and discuss plan of care  Family Goals: NA    Progress made toward(s) clinical / shift goals:  patient is cantonese speaking only,  Ipad at bedside. Pt reminded to use call bell for mobility needs via  call. Patient shifts weight in bed. Patient updated on plan of care and verbalized understanding.   Problem: Knowledge Deficit - Standard  Goal: Patient and family/care givers will demonstrate understanding of plan of care, disease process/condition, diagnostic tests and medications  Outcome: Progressing     Problem: Skin Integrity  Goal: Skin integrity is maintained or improved  Outcome: Progressing     Problem: Fall Risk  Goal: Patient will remain free from falls  Outcome: Progressing       Patient is not progressing towards the following goals:

## 2022-04-06 NOTE — PROGRESS NOTES
Cardiology follow up:  Met with patient and patient's daughter Yen at the bedside  Discussed and updated them about the patient's cardiac condition and the issue concerning his anemia the need for coronary intervention.  The patient and his daughter expressed understanding and wished to proceed.

## 2022-04-06 NOTE — CARE PLAN
Problem: Knowledge Deficit - Standard  Goal: Patient and family/care givers will demonstrate understanding of plan of care, disease process/condition, diagnostic tests and medications  Outcome: Progressing     Problem: Fall Risk  Goal: Patient will remain free from falls  Outcome: Progressing     Problem: Respiratory  Goal: Patient will achieve/maintain optimum respiratory ventilation and gas exchange  Outcome: Progressing  Note: Pt remains on 1 L through night due to desatting to 78 in his sleep, but popping right back up      Problem: Mobility  Goal: Patient's capacity to carry out activities will improve  Outcome: Progressing   The patient is Watcher - Medium risk of patient condition declining or worsening    Shift Goals  Clinical Goals: wean O2, monitor edema  Patient Goals: sleep  Family Goals: NA    Progress made toward(s) clinical / shift goals:  Pt not able to be weaned right now, edema in legs decreasing     Patient is not progressing towards the following goals:

## 2022-04-07 ENCOUNTER — APPOINTMENT (OUTPATIENT)
Dept: CARDIOLOGY | Facility: MEDICAL CENTER | Age: 67
DRG: 246 | End: 2022-04-07
Attending: INTERNAL MEDICINE
Payer: MEDICARE

## 2022-04-07 LAB
ACT BLD: 267 SEC (ref 74–137)
ACT BLD: 285 SEC (ref 74–137)
ACT BLD: 291 SEC (ref 74–137)
ACT BLD: 333 SEC (ref 74–137)
ANION GAP SERPL CALC-SCNC: 11 MMOL/L (ref 7–16)
BASOPHILS # BLD AUTO: 0.7 % (ref 0–1.8)
BASOPHILS # BLD: 0.07 K/UL (ref 0–0.12)
BUN SERPL-MCNC: 27 MG/DL (ref 8–22)
CALCIUM SERPL-MCNC: 10.6 MG/DL (ref 8.5–10.5)
CHLORIDE SERPL-SCNC: 94 MMOL/L (ref 96–112)
CO2 SERPL-SCNC: 30 MMOL/L (ref 20–33)
CREAT SERPL-MCNC: 1.23 MG/DL (ref 0.5–1.4)
EKG IMPRESSION: NORMAL
EOSINOPHIL # BLD AUTO: 0.93 K/UL (ref 0–0.51)
EOSINOPHIL NFR BLD: 9.3 % (ref 0–6.9)
ERYTHROCYTE [DISTWIDTH] IN BLOOD BY AUTOMATED COUNT: 48.4 FL (ref 35.9–50)
GFR SERPLBLD CREATININE-BSD FMLA CKD-EPI: 64 ML/MIN/1.73 M 2
GLUCOSE BLD STRIP.AUTO-MCNC: 101 MG/DL (ref 65–99)
GLUCOSE BLD STRIP.AUTO-MCNC: 117 MG/DL (ref 65–99)
GLUCOSE SERPL-MCNC: 73 MG/DL (ref 65–99)
HBV CORE AB SERPL QL IA: REACTIVE
HBV SURFACE AB SERPL IA-ACNC: 1518 MIU/ML (ref 0–10)
HBV SURFACE AG SER QL: ABNORMAL
HCT VFR BLD AUTO: 36.3 % (ref 42–52)
HCV AB SER QL: ABNORMAL
HGB BLD-MCNC: 11.6 G/DL (ref 14–18)
HIV 1+2 AB+HIV1 P24 AG SERPL QL IA: ABNORMAL
IMM GRANULOCYTES # BLD AUTO: 0.03 K/UL (ref 0–0.11)
IMM GRANULOCYTES NFR BLD AUTO: 0.3 % (ref 0–0.9)
LYMPHOCYTES # BLD AUTO: 3 K/UL (ref 1–4.8)
LYMPHOCYTES NFR BLD: 30.2 % (ref 22–41)
MCH RBC QN AUTO: 25.7 PG (ref 27–33)
MCHC RBC AUTO-ENTMCNC: 32 G/DL (ref 33.7–35.3)
MCV RBC AUTO: 80.3 FL (ref 81.4–97.8)
MONOCYTES # BLD AUTO: 0.97 K/UL (ref 0–0.85)
MONOCYTES NFR BLD AUTO: 9.7 % (ref 0–13.4)
NEUTROPHILS # BLD AUTO: 4.95 K/UL (ref 1.82–7.42)
NEUTROPHILS NFR BLD: 49.8 % (ref 44–72)
NRBC # BLD AUTO: 0 K/UL
NRBC BLD-RTO: 0 /100 WBC
PLATELET # BLD AUTO: 259 K/UL (ref 164–446)
PMV BLD AUTO: 10.2 FL (ref 9–12.9)
POTASSIUM SERPL-SCNC: 4.3 MMOL/L (ref 3.6–5.5)
RBC # BLD AUTO: 4.52 M/UL (ref 4.7–6.1)
SODIUM SERPL-SCNC: 135 MMOL/L (ref 135–145)
WBC # BLD AUTO: 10 K/UL (ref 4.8–10.8)

## 2022-04-07 PROCEDURE — A9270 NON-COVERED ITEM OR SERVICE: HCPCS | Performed by: INTERNAL MEDICINE

## 2022-04-07 PROCEDURE — 700102 HCHG RX REV CODE 250 W/ 637 OVERRIDE(OP): Performed by: INTERNAL MEDICINE

## 2022-04-07 PROCEDURE — 700111 HCHG RX REV CODE 636 W/ 250 OVERRIDE (IP)

## 2022-04-07 PROCEDURE — 82962 GLUCOSE BLOOD TEST: CPT | Mod: 91

## 2022-04-07 PROCEDURE — A9270 NON-COVERED ITEM OR SERVICE: HCPCS | Performed by: HOSPITALIST

## 2022-04-07 PROCEDURE — 99152 MOD SED SAME PHYS/QHP 5/>YRS: CPT | Performed by: INTERNAL MEDICINE

## 2022-04-07 PROCEDURE — 86803 HEPATITIS C AB TEST: CPT

## 2022-04-07 PROCEDURE — 92928 PRQ TCAT PLMT NTRAC ST 1 LES: CPT | Mod: 59,RC | Performed by: INTERNAL MEDICINE

## 2022-04-07 PROCEDURE — 700105 HCHG RX REV CODE 258: Performed by: INTERNAL MEDICINE

## 2022-04-07 PROCEDURE — 85347 COAGULATION TIME ACTIVATED: CPT

## 2022-04-07 PROCEDURE — 93005 ELECTROCARDIOGRAM TRACING: CPT | Performed by: INTERNAL MEDICINE

## 2022-04-07 PROCEDURE — G0475 HIV COMBINATION ASSAY: HCPCS

## 2022-04-07 PROCEDURE — 700111 HCHG RX REV CODE 636 W/ 250 OVERRIDE (IP): Performed by: STUDENT IN AN ORGANIZED HEALTH CARE EDUCATION/TRAINING PROGRAM

## 2022-04-07 PROCEDURE — B240ZZ3 ULTRASONOGRAPHY OF SINGLE CORONARY ARTERY, INTRAVASCULAR: ICD-10-PCS | Performed by: INTERNAL MEDICINE

## 2022-04-07 PROCEDURE — 700102 HCHG RX REV CODE 250 W/ 637 OVERRIDE(OP)

## 2022-04-07 PROCEDURE — 93010 ELECTROCARDIOGRAM REPORT: CPT | Mod: 59 | Performed by: INTERNAL MEDICINE

## 2022-04-07 PROCEDURE — 99153 MOD SED SAME PHYS/QHP EA: CPT

## 2022-04-07 PROCEDURE — 700102 HCHG RX REV CODE 250 W/ 637 OVERRIDE(OP): Performed by: HOSPITALIST

## 2022-04-07 PROCEDURE — 86704 HEP B CORE ANTIBODY TOTAL: CPT

## 2022-04-07 PROCEDURE — 86706 HEP B SURFACE ANTIBODY: CPT

## 2022-04-07 PROCEDURE — 700101 HCHG RX REV CODE 250

## 2022-04-07 PROCEDURE — A9270 NON-COVERED ITEM OR SERVICE: HCPCS | Performed by: STUDENT IN AN ORGANIZED HEALTH CARE EDUCATION/TRAINING PROGRAM

## 2022-04-07 PROCEDURE — A9270 NON-COVERED ITEM OR SERVICE: HCPCS

## 2022-04-07 PROCEDURE — 700102 HCHG RX REV CODE 250 W/ 637 OVERRIDE(OP): Performed by: STUDENT IN AN ORGANIZED HEALTH CARE EDUCATION/TRAINING PROGRAM

## 2022-04-07 PROCEDURE — 87340 HEPATITIS B SURFACE AG IA: CPT

## 2022-04-07 PROCEDURE — 85025 COMPLETE CBC W/AUTO DIFF WBC: CPT

## 2022-04-07 PROCEDURE — 92978 ENDOLUMINL IVUS OCT C 1ST: CPT | Mod: 26,LD | Performed by: INTERNAL MEDICINE

## 2022-04-07 PROCEDURE — 92979 ENDOLUMINL IVUS OCT C EA: CPT | Mod: 26,RC | Performed by: INTERNAL MEDICINE

## 2022-04-07 PROCEDURE — 99232 SBSQ HOSP IP/OBS MODERATE 35: CPT | Performed by: INTERNAL MEDICINE

## 2022-04-07 PROCEDURE — 700105 HCHG RX REV CODE 258: Performed by: STUDENT IN AN ORGANIZED HEALTH CARE EDUCATION/TRAINING PROGRAM

## 2022-04-07 PROCEDURE — 700117 HCHG RX CONTRAST REV CODE 255: Performed by: INTERNAL MEDICINE

## 2022-04-07 PROCEDURE — 027136Z DILATION OF CORONARY ARTERY, TWO ARTERIES WITH THREE DRUG-ELUTING INTRALUMINAL DEVICES, PERCUTANEOUS APPROACH: ICD-10-PCS | Performed by: INTERNAL MEDICINE

## 2022-04-07 PROCEDURE — 80048 BASIC METABOLIC PNL TOTAL CA: CPT

## 2022-04-07 PROCEDURE — 770020 HCHG ROOM/CARE - TELE (206)

## 2022-04-07 PROCEDURE — 36415 COLL VENOUS BLD VENIPUNCTURE: CPT

## 2022-04-07 RX ORDER — SODIUM CHLORIDE 9 MG/ML
INJECTION, SOLUTION INTRAVENOUS CONTINUOUS
Status: ACTIVE | OUTPATIENT
Start: 2022-04-07 | End: 2022-04-07

## 2022-04-07 RX ORDER — CLOPIDOGREL 300 MG/1
600 TABLET, FILM COATED ORAL ONCE
Status: DISCONTINUED | OUTPATIENT
Start: 2022-04-07 | End: 2022-04-07

## 2022-04-07 RX ORDER — PHENYLEPHRINE HCL IN 0.9% NACL 0.5 MG/5ML
SYRINGE (ML) INTRAVENOUS
Status: COMPLETED
Start: 2022-04-07 | End: 2022-04-07

## 2022-04-07 RX ORDER — CLOPIDOGREL 300 MG/1
TABLET, FILM COATED ORAL
Status: COMPLETED
Start: 2022-04-07 | End: 2022-04-07

## 2022-04-07 RX ORDER — CLOPIDOGREL BISULFATE 75 MG/1
75 TABLET ORAL DAILY
Status: DISCONTINUED | OUTPATIENT
Start: 2022-04-08 | End: 2022-04-08 | Stop reason: HOSPADM

## 2022-04-07 RX ORDER — MIDAZOLAM HYDROCHLORIDE 1 MG/ML
INJECTION INTRAMUSCULAR; INTRAVENOUS
Status: COMPLETED
Start: 2022-04-07 | End: 2022-04-07

## 2022-04-07 RX ORDER — HEPARIN SODIUM 1000 [USP'U]/ML
INJECTION, SOLUTION INTRAVENOUS; SUBCUTANEOUS
Status: COMPLETED
Start: 2022-04-07 | End: 2022-04-07

## 2022-04-07 RX ORDER — VERAPAMIL HYDROCHLORIDE 2.5 MG/ML
INJECTION, SOLUTION INTRAVENOUS
Status: COMPLETED
Start: 2022-04-07 | End: 2022-04-07

## 2022-04-07 RX ORDER — LIDOCAINE HYDROCHLORIDE 20 MG/ML
INJECTION, SOLUTION INFILTRATION; PERINEURAL
Status: COMPLETED
Start: 2022-04-07 | End: 2022-04-07

## 2022-04-07 RX ORDER — SODIUM CHLORIDE 9 MG/ML
INJECTION, SOLUTION INTRAVENOUS CONTINUOUS
Status: DISCONTINUED | OUTPATIENT
Start: 2022-04-07 | End: 2022-04-08

## 2022-04-07 RX ORDER — HEPARIN SODIUM 200 [USP'U]/100ML
INJECTION, SOLUTION INTRAVENOUS
Status: COMPLETED
Start: 2022-04-07 | End: 2022-04-07

## 2022-04-07 RX ADMIN — Medication 2000 UNITS: at 06:30

## 2022-04-07 RX ADMIN — METOPROLOL TARTRATE 12.5 MG: 25 TABLET, FILM COATED ORAL at 21:46

## 2022-04-07 RX ADMIN — VERAPAMIL HYDROCHLORIDE 2.5 MG: 2.5 INJECTION, SOLUTION INTRAVENOUS at 15:46

## 2022-04-07 RX ADMIN — SODIUM CHLORIDE: 9 INJECTION, SOLUTION INTRAVENOUS at 21:47

## 2022-04-07 RX ADMIN — SODIUM CHLORIDE: 9 INJECTION, SOLUTION INTRAVENOUS at 17:59

## 2022-04-07 RX ADMIN — SENNOSIDES AND DOCUSATE SODIUM 2 TABLET: 50; 8.6 TABLET ORAL at 18:02

## 2022-04-07 RX ADMIN — SODIUM CHLORIDE: 9 INJECTION, SOLUTION INTRAVENOUS at 12:58

## 2022-04-07 RX ADMIN — ACETAMINOPHEN 650 MG: 325 TABLET, FILM COATED ORAL at 21:46

## 2022-04-07 RX ADMIN — INSULIN HUMAN 3 UNITS: 100 INJECTION, SOLUTION PARENTERAL at 21:44

## 2022-04-07 RX ADMIN — ROSUVASTATIN CALCIUM 40 MG: 20 TABLET, FILM COATED ORAL at 18:02

## 2022-04-07 RX ADMIN — IOHEXOL 110 ML: 350 INJECTION, SOLUTION INTRAVENOUS at 17:00

## 2022-04-07 RX ADMIN — HEPARIN SODIUM: 1000 INJECTION, SOLUTION INTRAVENOUS; SUBCUTANEOUS at 15:46

## 2022-04-07 RX ADMIN — INSULIN GLARGINE 15 UNITS: 100 INJECTION, SOLUTION SUBCUTANEOUS at 18:02

## 2022-04-07 RX ADMIN — SODIUM CHLORIDE 250 MG: 9 INJECTION, SOLUTION INTRAVENOUS at 21:46

## 2022-04-07 RX ADMIN — ASPIRIN 81 MG: 81 TABLET, COATED ORAL at 06:30

## 2022-04-07 RX ADMIN — FENTANYL CITRATE 100 MCG: 50 INJECTION, SOLUTION INTRAMUSCULAR; INTRAVENOUS at 16:53

## 2022-04-07 RX ADMIN — LIDOCAINE HYDROCHLORIDE: 20 INJECTION, SOLUTION INFILTRATION; PERINEURAL at 15:46

## 2022-04-07 RX ADMIN — CLOPIDOGREL BISULFATE 600 MG: 300 TABLET, FILM COATED ORAL at 15:38

## 2022-04-07 RX ADMIN — MIDAZOLAM HYDROCHLORIDE 2 MG: 1 INJECTION, SOLUTION INTRAMUSCULAR; INTRAVENOUS at 16:50

## 2022-04-07 RX ADMIN — SENNOSIDES AND DOCUSATE SODIUM 2 TABLET: 50; 8.6 TABLET ORAL at 06:31

## 2022-04-07 RX ADMIN — NITROGLYCERIN: 20 INJECTION INTRAVENOUS at 15:47

## 2022-04-07 RX ADMIN — HEPARIN SODIUM 2000 UNITS: 200 INJECTION, SOLUTION INTRAVENOUS at 15:46

## 2022-04-07 ASSESSMENT — ENCOUNTER SYMPTOMS
EYE DISCHARGE: 0
SORE THROAT: 0
NAUSEA: 0
VOMITING: 0
SINUS PAIN: 0
DIARRHEA: 0
MEMORY LOSS: 0
CHILLS: 0
STRIDOR: 0
NERVOUS/ANXIOUS: 0
DIAPHORESIS: 0
FEVER: 0
DOUBLE VISION: 0
NECK PAIN: 0
WEIGHT LOSS: 0
EYE PAIN: 0
COUGH: 0
SPUTUM PRODUCTION: 0
INSOMNIA: 0
BRUISES/BLEEDS EASILY: 0
PALPITATIONS: 0
FLANK PAIN: 0
TINGLING: 0
HEADACHES: 0
WHEEZING: 0
BLOOD IN STOOL: 0
DIZZINESS: 0
SEIZURES: 0
SHORTNESS OF BREATH: 1
BACK PAIN: 0
POLYDIPSIA: 0
MYALGIAS: 0
EYE REDNESS: 0
WEAKNESS: 0

## 2022-04-07 ASSESSMENT — PAIN DESCRIPTION - PAIN TYPE
TYPE: ACUTE PAIN
TYPE: ACUTE PAIN

## 2022-04-07 ASSESSMENT — FIBROSIS 4 INDEX
FIB4 SCORE: 1.41
FIB4 SCORE: 1.41

## 2022-04-07 NOTE — CARE PLAN
The patient is Watcher - Medium risk of patient condition declining or worsening    Shift Goals  Clinical Goals: heart cath  Patient Goals: rest, cath  Family Goals: DAWIT    Progress made toward(s) clinical / shift goals:  heart cath     Patient is not progressing towards the following goals:      Problem: Fall Risk  Goal: Patient will remain free from falls  Outcome: Not Progressing

## 2022-04-07 NOTE — PROGRESS NOTES
[facility] Medicine Daily Progress Note    Date of Service  4/7/2022    Chief Complaint  Chucky Keith is a 66 y.o. male admitted 4/2/2022 with shortness of breath     Hospital Course  Chucky Keith is a 66 y.o. male who presented 4/2/2022 with generalized edema and right scrotal swelling.  He reports that over the past month he has noted increasing swelling and over the past few days he has noted progressive dyspnea on exertion associated with orthopnea.  He denies any fever or chills.  He became concerned about the increasing scrotal swelling that he presented to the emergency department.  He has a history of diabetes dyslipidemia and hypertension.  He denies any known history of cardiac disease.  No trauma.  No fever no chills.  His diabetes has been controlled on Lantus.      Interval Problem Update  4/3/2022:  Patient continuing with aggressive diuresis patient still has approximately 1-2+ pitting edema up to the mid lower leg bilaterally.  No acute events overnight patient endorses improvement of breathing.  Offers no other concerns at this time.  Plan is to continue with aggressive diuresis when appropriate transition to beta-blocker upon reaching euvolemia continue with losartan echocardiogram noted patient also require spironolactone ejection fraction of approximately 20%.    4/4/2022:  Patient had echocardiogram performed in the afternoon of 4/3/2022 which showed a significantly reduced ejection fraction approximately 20% patient insulin-dependent diabetic I consulted cardiology because patient likely would benefit from ischemic evaluation the form of cardiac catheterization.  Patient did have increasing creatinine today I have held Lasix that present in addition to metolazone.  May consider nephrology consult following outcome of cardiac catheterization.    4/5/2022  Patient evaluated bedside, patient no acute distress eating meal, happy.  Oxygen titrated down to 1 L nasal cannula.  Hemoglobin stable and has remained  above 9 throughout hospital stay.  Patient reporting 10-day history of melena but none noted by staff.  Patient denies history of anemia or EGD/colonoscopy in the past.  Iron studies suggestive of mild iron deficiency anemia, IV iron per pharmacy, avoid false positive FOBT.  Pending FOBT.  Cardiology following and holding off on initiating antiplatelet therapy in the setting of possible GI bleed.  Cardiology planning for PCI in near future, appreciate recommendations. Start oral PPI.  Continue monitoring. Labs on a.m.    4/6: Patient seen and examined, afebrile, no overnight events, afebrile, no nausea or vomiting. Cardiology following appreciate rec. His occult blood stool is negative hemoglobin stable.  Cardiology starting back on aspirin     4/7: Patient resting in bed, no overnight events, cardilogy following and plan for PCI today appreciate rec.    I have personally seen and examined the patient at bedside. I discussed the plan of care with patient, bedside RN and cardiology.    Consultants/Specialty  cardiology    Code Status  Full Code    Disposition  Patient is not medically cleared for discharge.   Anticipate discharge to to home with close outpatient follow-up.  I have placed the appropriate orders for post-discharge needs.    Review of Systems  Review of Systems   Constitutional: Negative for chills, diaphoresis, fever, malaise/fatigue and weight loss.   HENT: Negative for congestion, nosebleeds, sinus pain and sore throat.    Eyes: Negative for double vision, pain, discharge and redness.   Respiratory: Positive for shortness of breath. Negative for cough, sputum production, wheezing and stridor.    Cardiovascular: Positive for leg swelling. Negative for chest pain and palpitations.   Gastrointestinal: Negative for blood in stool, diarrhea, nausea and vomiting.   Genitourinary: Negative for flank pain, frequency and urgency.   Musculoskeletal: Negative for back pain, joint pain, myalgias and neck pain.    Skin: Negative for itching and rash.   Neurological: Negative for dizziness, tingling, seizures, weakness and headaches.   Endo/Heme/Allergies: Negative for polydipsia. Does not bruise/bleed easily.   Psychiatric/Behavioral: Negative for memory loss. The patient is not nervous/anxious and does not have insomnia.         Physical Exam  Temp:  [36.2 °C (97.1 °F)-37.2 °C (99 °F)] 37.2 °C (99 °F)  Pulse:  [80-87] 87  Resp:  [16-20] 16  BP: ()/(51-63) 99/56  SpO2:  [94 %-97 %] 96 %    Physical Exam  Vitals and nursing note reviewed.   Constitutional:       General: He is not in acute distress.     Appearance: Normal appearance. He is well-developed. He is obese. He is not ill-appearing or diaphoretic.   HENT:      Head: Normocephalic and atraumatic.      Mouth/Throat:      Pharynx: No oropharyngeal exudate.   Eyes:      General: No scleral icterus.        Right eye: No discharge.         Left eye: No discharge.      Conjunctiva/sclera: Conjunctivae normal.      Pupils: Pupils are equal, round, and reactive to light.   Neck:      Vascular: No JVD.      Trachea: No tracheal deviation.   Cardiovascular:      Rate and Rhythm: Regular rhythm. Tachycardia present.      Heart sounds: No murmur heard.    No friction rub. No gallop.   Pulmonary:      Effort: Pulmonary effort is normal. No respiratory distress.      Breath sounds: No stridor. Rales present. No wheezing.   Chest:      Chest wall: No tenderness.   Abdominal:      General: Bowel sounds are normal. There is no distension.      Palpations: Abdomen is soft.      Tenderness: There is no abdominal tenderness. There is no rebound.      Hernia: A hernia (Right inguinal) is present.   Musculoskeletal:         General: Swelling present. No tenderness.      Cervical back: Neck supple.      Right lower leg: Edema present.      Left lower leg: Edema present.   Skin:     General: Skin is warm and dry.      Nails: There is no clubbing.   Neurological:      Mental Status:  He is alert and oriented to person, place, and time.      Cranial Nerves: No cranial nerve deficit.      Motor: No abnormal muscle tone.   Psychiatric:         Behavior: Behavior normal.         Fluids    Intake/Output Summary (Last 24 hours) at 4/7/2022 1157  Last data filed at 4/7/2022 0400  Gross per 24 hour   Intake 700 ml   Output 2200 ml   Net -1500 ml       Laboratory  Recent Labs     04/05/22  0110 04/06/22  0108 04/07/22  0313   WBC 8.4 9.0 10.0   RBC 3.94* 4.12* 4.52*   HEMOGLOBIN 10.1* 10.6* 11.6*   HEMATOCRIT 31.3* 32.8* 36.3*   MCV 79.4* 79.6* 80.3*   MCH 25.6* 25.7* 25.7*   MCHC 32.3* 32.3* 32.0*   RDW 47.6 46.7 48.4   PLATELETCT 234 242 259   MPV 10.0 10.0 10.2     Recent Labs     04/05/22  0110 04/06/22  0108 04/07/22  0313   SODIUM 136 136 135   POTASSIUM 3.7 4.0 4.3   CHLORIDE 94* 94* 94*   CO2 33 31 30   GLUCOSE 48* 63* 73   BUN 21 21 27*   CREATININE 1.28 1.22 1.23   CALCIUM 10.1 10.6* 10.6*                   Imaging  EC-ECHOCARDIOGRAM COMPLETE W/O CONT   Final Result      US-EXTREMITY VENOUS LOWER UNILAT RIGHT   Final Result      CT-ABDOMEN-PELVIS WITH   Final Result      1.  Diffuse anasarca. Small pleural effusions, mild ascites and body wall and scrotal edema.   2.  Large right inguinal hernia containing a loop of colon. No bowel obstruction.   3.  No definite evidence metastatic disease.   4.  Cardiomegaly.   5.  Nonspecific right iliacus fluid collection measuring 4.1 x 2.9 cm.      DX-CHEST-PORTABLE (1 VIEW)   Final Result      Cardiomegaly with mild interstitial edema consistent with CHF. Possible trace pleural effusions.      CL-LEFT HEART CATHETERIZATION WITH POSSIBLE INTERVENTION    (Results Pending)   CL-LEFT HEART CATHETERIZATION WITH POSSIBLE INTERVENTION    (Results Pending)        Assessment/Plan  * Anasarca- (present on admission)  Assessment & Plan  Echo showing EF of 20 %  on IV Lasix  Cardiology following appreciate rec   Had cardiac cath done on 4/4     Acute systolic heart  failure (HCC)  Assessment & Plan  Patient with newly diagnosed heart failure with reduced ejection fraction approximately 20%  Plan:  1.  Aggressive diuresis to reach euvolemic state  2.  Ischemic work-up in the form of cardiac catheterization patient has significant risk factors the form of insulin-dependent diabetes.  3.  ACE inhibitor/ARB or Arni as appropriate beta-blocker when euvolemic high intensity statin spironolactone.    Had cardiac cath on 4/4 and will go for PCI today now that GI bleed has been r/o     Elevated troponin  Assessment & Plan  Likely demand ischemia  Recheck troponin follow-up on echocardiogram    Tobacco dependence  Assessment & Plan  Counseled on cessation    Pelvic fluid collection  Assessment & Plan  Reviewed and discussed CT findings with Dr. Pavon from interventional radiology   Dr Pavon has low threshold to drain fluid collection if any signs of infection, patient currently has no clinical signs(afebrile with no leukocytosis) and he would benefit from further stabilization of his anasarca and suspected CHF prior to any invasive procedures  Dr. Pavon recommended ensuring repeat CT in 1 week to follow-up     Right inguinal hernia  Assessment & Plan  Will need surgery evaluation for elective repair    Leg edema  Assessment & Plan  Follow-up on pending venous duplex    Congestive heart failure of unknown etiology (HCC)- (present on admission)  Assessment & Plan  Start diuresis and check echocardiogram    Anemia- (present on admission)  Assessment & Plan  Chronic anemia  Patient reporting a 10-day history of GI bleed  Denied GI work-up with EGD or colonoscopy  Hemoglobin has remained stable and above 9 throughout hospital stay\bleed were to be present likely slow but not ruled out  Iron studies suggestive of mild iron deficiency anemia  IV Iron per pharmacy  Occult blood negative    Essential hypertension- (present on admission)  Assessment & Plan  Continue losartan  Monitor blood  pressure    Diabetic polyneuropathy associated with type 2 diabetes mellitus (HCC)- (present on admission)  Assessment & Plan  Continue Lantus  Insulin sliding scale monitor CBGs  Most recent hemoglobin A1c 7.0    Dyslipidemia- (present on admission)  Assessment & Plan  Continue simvastatin       VTE prophylaxis: SCDs/TEDs    I have performed a physical exam and reviewed and updated ROS and Plan today (4/7/2022). In review of yesterday's note (4/6/2022), there are no changes except as documented above.

## 2022-04-07 NOTE — CARE PLAN
Problem: Pain - Standard  Goal: Alleviation of pain or a reduction in pain to the patient’s comfort goal  Outcome: Progressing     Problem: Skin Integrity  Goal: Skin integrity is maintained or improved  Outcome: Progressing     Problem: Fall Risk  Goal: Patient will remain free from falls  Outcome: Progressing     Problem: Respiratory  Goal: Patient will achieve/maintain optimum respiratory ventilation and gas exchange  Outcome: Progressing   The patient is Watcher - Medium risk of patient condition declining or worsening    Shift Goals  Clinical Goals: monitor hemodynamics, I & O  Patient Goals: rest  Family Goals: NA    Progress made toward(s) clinical / shift goals:  I & O documented, hemodynamics stable     Patient is not progressing towards the following goals:

## 2022-04-08 ENCOUNTER — PHARMACY VISIT (OUTPATIENT)
Dept: PHARMACY | Facility: MEDICAL CENTER | Age: 67
End: 2022-04-08
Payer: COMMERCIAL

## 2022-04-08 VITALS
HEIGHT: 65 IN | BODY MASS INDEX: 25.09 KG/M2 | WEIGHT: 150.57 LBS | OXYGEN SATURATION: 95 % | SYSTOLIC BLOOD PRESSURE: 104 MMHG | TEMPERATURE: 97.8 F | DIASTOLIC BLOOD PRESSURE: 59 MMHG | HEART RATE: 77 BPM | RESPIRATION RATE: 18 BRPM

## 2022-04-08 LAB
ANION GAP SERPL CALC-SCNC: 11 MMOL/L (ref 7–16)
BASOPHILS # BLD AUTO: 0.7 % (ref 0–1.8)
BASOPHILS # BLD: 0.06 K/UL (ref 0–0.12)
BUN SERPL-MCNC: 26 MG/DL (ref 8–22)
CALCIUM SERPL-MCNC: 9.4 MG/DL (ref 8.5–10.5)
CHLORIDE SERPL-SCNC: 96 MMOL/L (ref 96–112)
CO2 SERPL-SCNC: 23 MMOL/L (ref 20–33)
CREAT SERPL-MCNC: 1.18 MG/DL (ref 0.5–1.4)
EKG IMPRESSION: NORMAL
EOSINOPHIL # BLD AUTO: 0.11 K/UL (ref 0–0.51)
EOSINOPHIL NFR BLD: 1.3 % (ref 0–6.9)
ERYTHROCYTE [DISTWIDTH] IN BLOOD BY AUTOMATED COUNT: 49.6 FL (ref 35.9–50)
GFR SERPLBLD CREATININE-BSD FMLA CKD-EPI: 67 ML/MIN/1.73 M 2
GLUCOSE BLD STRIP.AUTO-MCNC: 102 MG/DL (ref 65–99)
GLUCOSE BLD STRIP.AUTO-MCNC: 211 MG/DL (ref 65–99)
GLUCOSE BLD STRIP.AUTO-MCNC: 241 MG/DL (ref 65–99)
GLUCOSE BLD STRIP.AUTO-MCNC: 249 MG/DL (ref 65–99)
GLUCOSE BLD STRIP.AUTO-MCNC: 354 MG/DL (ref 65–99)
GLUCOSE SERPL-MCNC: 308 MG/DL (ref 65–99)
HCT VFR BLD AUTO: 34.4 % (ref 42–52)
HGB BLD-MCNC: 10.9 G/DL (ref 14–18)
IMM GRANULOCYTES # BLD AUTO: 0.01 K/UL (ref 0–0.11)
IMM GRANULOCYTES NFR BLD AUTO: 0.1 % (ref 0–0.9)
LYMPHOCYTES # BLD AUTO: 1.89 K/UL (ref 1–4.8)
LYMPHOCYTES NFR BLD: 23.1 % (ref 22–41)
MCH RBC QN AUTO: 25.6 PG (ref 27–33)
MCHC RBC AUTO-ENTMCNC: 31.7 G/DL (ref 33.7–35.3)
MCV RBC AUTO: 80.8 FL (ref 81.4–97.8)
MONOCYTES # BLD AUTO: 0.79 K/UL (ref 0–0.85)
MONOCYTES NFR BLD AUTO: 9.6 % (ref 0–13.4)
NEUTROPHILS # BLD AUTO: 5.33 K/UL (ref 1.82–7.42)
NEUTROPHILS NFR BLD: 65.2 % (ref 44–72)
NRBC # BLD AUTO: 0 K/UL
NRBC BLD-RTO: 0 /100 WBC
PLATELET # BLD AUTO: 246 K/UL (ref 164–446)
PMV BLD AUTO: 9.7 FL (ref 9–12.9)
POTASSIUM SERPL-SCNC: 4.1 MMOL/L (ref 3.6–5.5)
RBC # BLD AUTO: 4.26 M/UL (ref 4.7–6.1)
SODIUM SERPL-SCNC: 130 MMOL/L (ref 135–145)
WBC # BLD AUTO: 8.2 K/UL (ref 4.8–10.8)

## 2022-04-08 PROCEDURE — A9270 NON-COVERED ITEM OR SERVICE: HCPCS | Performed by: INTERNAL MEDICINE

## 2022-04-08 PROCEDURE — 93010 ELECTROCARDIOGRAM REPORT: CPT | Performed by: INTERNAL MEDICINE

## 2022-04-08 PROCEDURE — 82962 GLUCOSE BLOOD TEST: CPT | Mod: 91

## 2022-04-08 PROCEDURE — 85025 COMPLETE CBC W/AUTO DIFF WBC: CPT

## 2022-04-08 PROCEDURE — 700102 HCHG RX REV CODE 250 W/ 637 OVERRIDE(OP): Performed by: INTERNAL MEDICINE

## 2022-04-08 PROCEDURE — 97162 PT EVAL MOD COMPLEX 30 MIN: CPT

## 2022-04-08 PROCEDURE — 36415 COLL VENOUS BLD VENIPUNCTURE: CPT

## 2022-04-08 PROCEDURE — 97535 SELF CARE MNGMENT TRAINING: CPT

## 2022-04-08 PROCEDURE — 80048 BASIC METABOLIC PNL TOTAL CA: CPT

## 2022-04-08 PROCEDURE — 99239 HOSP IP/OBS DSCHRG MGMT >30: CPT | Performed by: INTERNAL MEDICINE

## 2022-04-08 PROCEDURE — 99232 SBSQ HOSP IP/OBS MODERATE 35: CPT | Performed by: INTERNAL MEDICINE

## 2022-04-08 PROCEDURE — 93005 ELECTROCARDIOGRAM TRACING: CPT | Performed by: INTERNAL MEDICINE

## 2022-04-08 PROCEDURE — RXMED WILLOW AMBULATORY MEDICATION CHARGE: Performed by: INTERNAL MEDICINE

## 2022-04-08 PROCEDURE — 700102 HCHG RX REV CODE 250 W/ 637 OVERRIDE(OP): Performed by: HOSPITALIST

## 2022-04-08 PROCEDURE — A9270 NON-COVERED ITEM OR SERVICE: HCPCS | Performed by: HOSPITALIST

## 2022-04-08 RX ORDER — FUROSEMIDE 20 MG/1
20 TABLET ORAL DAILY
Qty: 60 TABLET | Refills: 0 | Status: SHIPPED | OUTPATIENT
Start: 2022-04-09 | End: 2022-04-12 | Stop reason: SDUPTHER

## 2022-04-08 RX ORDER — ASPIRIN 81 MG/1
81 TABLET ORAL DAILY
Qty: 30 TABLET | Refills: 0 | Status: SHIPPED | OUTPATIENT
Start: 2022-04-09 | End: 2022-05-10 | Stop reason: SDUPTHER

## 2022-04-08 RX ORDER — METOPROLOL SUCCINATE 25 MG/1
25 TABLET, EXTENDED RELEASE ORAL
Status: DISCONTINUED | OUTPATIENT
Start: 2022-04-08 | End: 2022-04-08 | Stop reason: HOSPADM

## 2022-04-08 RX ORDER — SPIRONOLACTONE 25 MG/1
12.5 TABLET ORAL
Status: DISCONTINUED | OUTPATIENT
Start: 2022-04-08 | End: 2022-04-08 | Stop reason: HOSPADM

## 2022-04-08 RX ORDER — METOPROLOL SUCCINATE 25 MG/1
25 TABLET, EXTENDED RELEASE ORAL DAILY
Qty: 30 TABLET | Refills: 0 | Status: SHIPPED | OUTPATIENT
Start: 2022-04-09 | End: 2022-04-12 | Stop reason: SDUPTHER

## 2022-04-08 RX ORDER — SPIRONOLACTONE 25 MG/1
12.5 TABLET ORAL DAILY
Qty: 30 TABLET | Refills: 3 | Status: SHIPPED | OUTPATIENT
Start: 2022-04-09 | End: 2022-04-12 | Stop reason: SDUPTHER

## 2022-04-08 RX ORDER — ROSUVASTATIN CALCIUM 40 MG/1
40 TABLET, COATED ORAL EVERY EVENING
Qty: 30 TABLET | Refills: 11 | Status: SHIPPED | OUTPATIENT
Start: 2022-04-08 | End: 2022-04-12 | Stop reason: SDUPTHER

## 2022-04-08 RX ORDER — CLOPIDOGREL BISULFATE 75 MG/1
75 TABLET ORAL DAILY
Qty: 30 TABLET | Refills: 0 | Status: SHIPPED | OUTPATIENT
Start: 2022-04-09 | End: 2022-04-12 | Stop reason: SDUPTHER

## 2022-04-08 RX ADMIN — INSULIN HUMAN 3 UNITS: 100 INJECTION, SOLUTION PARENTERAL at 17:51

## 2022-04-08 RX ADMIN — METOPROLOL TARTRATE 12.5 MG: 25 TABLET, FILM COATED ORAL at 05:14

## 2022-04-08 RX ADMIN — FUROSEMIDE 20 MG: 20 TABLET ORAL at 05:14

## 2022-04-08 RX ADMIN — INSULIN HUMAN 3 UNITS: 100 INJECTION, SOLUTION PARENTERAL at 08:36

## 2022-04-08 RX ADMIN — SENNOSIDES AND DOCUSATE SODIUM 2 TABLET: 50; 8.6 TABLET ORAL at 05:14

## 2022-04-08 RX ADMIN — SPIRONOLACTONE 12.5 MG: 25 TABLET, FILM COATED ORAL at 08:35

## 2022-04-08 RX ADMIN — POTASSIUM CHLORIDE 20 MEQ: 20 TABLET, EXTENDED RELEASE ORAL at 05:14

## 2022-04-08 RX ADMIN — CLOPIDOGREL BISULFATE 75 MG: 75 TABLET ORAL at 05:15

## 2022-04-08 RX ADMIN — Medication 2000 UNITS: at 05:14

## 2022-04-08 RX ADMIN — METOPROLOL SUCCINATE 25 MG: 25 TABLET, EXTENDED RELEASE ORAL at 08:35

## 2022-04-08 RX ADMIN — LOSARTAN POTASSIUM 25 MG: 25 TABLET, FILM COATED ORAL at 05:14

## 2022-04-08 RX ADMIN — ASPIRIN 81 MG: 81 TABLET, COATED ORAL at 05:14

## 2022-04-08 RX ADMIN — INSULIN HUMAN 8 UNITS: 100 INJECTION, SOLUTION PARENTERAL at 13:12

## 2022-04-08 RX ADMIN — INSULIN GLARGINE 15 UNITS: 100 INJECTION, SOLUTION SUBCUTANEOUS at 17:53

## 2022-04-08 ASSESSMENT — GAIT ASSESSMENTS
GAIT LEVEL OF ASSIST: SUPERVISED
DISTANCE (FEET): 250

## 2022-04-08 ASSESSMENT — COGNITIVE AND FUNCTIONAL STATUS - GENERAL
STANDING UP FROM CHAIR USING ARMS: A LITTLE
MOVING FROM LYING ON BACK TO SITTING ON SIDE OF FLAT BED: A LITTLE
CLIMB 3 TO 5 STEPS WITH RAILING: A LITTLE
SUGGESTED CMS G CODE MODIFIER MOBILITY: CJ
MOBILITY SCORE: 21

## 2022-04-08 NOTE — PROGRESS NOTES
A&Ox4. VSS w/ hypotension. NPO all day for cardiac cath, post cath vial signs done. Removed 3 cc of air q15 minutes per order. Pt resting comfortably, no complaints of pain.

## 2022-04-08 NOTE — PROGRESS NOTES
Cardiology Follow Up Progress Note    Date of Service  4/8/2022    Attending Physician  Thomas Patel*    HPI  Pac Keith is a 66 y.o. male admitted 4/2/2022 with newly diagnosed acute HFrEF 20% with prior history of hypertension, dyslipidemia, DM and chronic tobacco use with coronary angiography 4/2/2022 showing CAD involving the LAD and RCA, LAD PCI and antiplatelet therapy deferred until iron deficiency anemia defined and evaluation complete.    Interim Events  4/5: /65.  HR 90.  Sinus.  No cardiac symptoms.  No cardiac events.  4/6: /62 HR 81.  UO 3750 cc no events overnight per RN at the bedside this a.m.  Does not speak English but indicates that he feels fine, ambulating without trouble.  4/7: Underwent PCI.  4/8: /59 HR 79.  The patient tolerated the PCI with no complications.  No cardiac events overnight.      Review of Systems  Review of Systems   Unable to perform ROS: Other       Vital signs in last 24 hours  Temp:  [36.1 °C (97 °F)-37.2 °C (99 °F)] 36.2 °C (97.1 °F)  Pulse:  [] 79  Resp:  [16-18] 18  BP: ()/(56-86) 106/59  SpO2:  [96 %-100 %] 97 %    Physical Exam  Physical Exam  Constitutional:       General: He is not in acute distress.     Comments: Frail.   HENT:      Head: Normocephalic.   Eyes:      General: No scleral icterus.  Cardiovascular:      Rate and Rhythm: Normal rate and regular rhythm.      Heart sounds: Normal heart sounds, S1 normal and S2 normal. No murmur heard.    No friction rub. No gallop.   Pulmonary:      Effort: Pulmonary effort is normal.      Breath sounds: Normal breath sounds. No wheezing, rhonchi or rales.   Musculoskeletal:      Right lower leg: No edema.      Left lower leg: No edema.      Comments: Right radial pulse 2+ no hematoma.   Skin:     General: Skin is warm and dry.   Neurological:      Mental Status: He is alert.   Psychiatric:         Behavior: Behavior normal.         Lab Review  Lab Results   Component Value Date/Time     WBC 8.2 04/08/2022 01:00 AM    RBC 4.26 (L) 04/08/2022 01:00 AM    HEMOGLOBIN 10.9 (L) 04/08/2022 01:00 AM    HEMATOCRIT 34.4 (L) 04/08/2022 01:00 AM    MCV 80.8 (L) 04/08/2022 01:00 AM    MCH 25.6 (L) 04/08/2022 01:00 AM    MCHC 31.7 (L) 04/08/2022 01:00 AM    MPV 9.7 04/08/2022 01:00 AM      Lab Results   Component Value Date/Time    SODIUM 130 (L) 04/08/2022 01:00 AM    POTASSIUM 4.1 04/08/2022 01:00 AM    CHLORIDE 96 04/08/2022 01:00 AM    CO2 23 04/08/2022 01:00 AM    GLUCOSE 308 (H) 04/08/2022 01:00 AM    BUN 26 (H) 04/08/2022 01:00 AM    CREATININE 1.18 04/08/2022 01:00 AM    CREATININE 0.9 12/29/2005 04:08 PM      Lab Results   Component Value Date/Time    ASTSGOT 30 04/02/2022 11:31 AM    ALTSGPT 31 04/02/2022 11:31 AM     Lab Results   Component Value Date/Time    CHOLSTRLTOT 91 (L) 03/28/2022 12:21 PM    LDL 48 03/28/2022 12:21 PM    HDL 34 (A) 03/28/2022 12:21 PM    TRIGLYCERIDE 46 03/28/2022 12:21 PM    TROPONINT 25 (H) 04/02/2022 05:04 PM       No results for input(s): NTPROBNP in the last 72 hours.    Cardiac Imaging and Procedures Review  EKG:  My personal interpretation of the EKG dated/2/2022 is sinus tachycardia, rate 102, nonspecific ST-T changes    Rhythm: My personal interpretation of the rhythm dated 4/8/2022 is sinus rhythm.    Echocardiogram: 4/3/2022  Severely reduced left ventricular systolic function.  Global hypokinesis, relatively preserved wall motion laterally.  Grade III diastolic dysfunction.  Dilated IVC without inspiratory collapse.  Reduced right ventricular systolic function.  Moderate tricuspid regurgitation.  Estimated right ventricular systolic pressure is 62  mmHg.  Incidental note of right atrial mass - most consistent with a prominent   eustachian valve (normal anatomic variation); correlate clinically.    Cardiac Catheterization: 4/4/2022  1.  Left main coronary artery:  Normal.  2.  Left anterior descending artery: Mid LAD has 80% calcific stenosis after first  diagonal takeoff.  First diagonal has luminal irregularities.  Second diagonal is smaller branch, has 70% stenosis in proximal portion  3.  Left circumflex coronary artery: Proximal, midportion, large first marginal free of significant disease.  Distal AV groove branch has moderate stenosis.  4.  Right coronary artery: 60-70% stenosis in proximal portion.  Diffuse 60% stenosis in midportion.  Right posterolateral branch is diffusely diseased with a focal 70% stenosis in the midportion .  This is a right dominant system.  5.  Left ventricular end diastolic pressure:  19 mmHg.  No signficant gradient across the aortic valve.     CARDIAC CATHETERIZATION 4/7/2022  1.  PCI LAD proximal/mid, 3.0 x 38 mm MARTA.  2.  PCI RCA ostium-distal, 3.0 x 38 mm MARTA x2 overlapping.  3. Successful preservation of the D2 with POBA  4.  Normal LVEDP    Imaging  Chest X-Ray: 4/2/2022  Cardiomegaly with mild interstitial edema consistent with CHF. Possible trace pleural effusions.     Assessment  1.  HFrEF 20%.  2.  Ischemic cardiomyopathy.  3.  PCI LAD, RCA/7/2022.  4.  Hypertension.  5.  Dyslipidemia.  6.  Diabetes mellitus  7.  Chronic tobacco use.    Recommendation Discussion  1.  HFrEF, ICM: Clinically euvolemic,, adjust furosemide 20 mg qd,, losartan 25 mg qd, metoprolol SR 25 mg qd, will add spironolactone 12.5 mg daily (sodium probably delusional due to hyperglycemia of 308), eventually start empagliflozin.  2.  CAD/PCI: On aspirin, clopidogrel, continue atorvastatin  3.  Hypertension: BP normal, at goal, continue losartan.  4.  Dyslipidemia: Continue atorvastatin, LDL 48 at goal.  5.  FELICITA: Still improving.  6.  Anemia: Work-up per primary team.  7.  I attempted to call patient's daughter Yen, no answer.  8.  Stable from a cardiac standpoint to be discharged, cardiology follow-up arranged with [facility] prior to appointment.    Thank you for allowing me to participate in the care of this patient.    Please contact me with any  questions.    Minh Rodriguez M.D.   Cardiologist, [facility]  [phone]

## 2022-04-08 NOTE — CARE PLAN
The patient is Watcher - Medium risk of patient condition declining or worsening    Shift Goals  Clinical Goals: heart cath  Patient Goals: rest, cath  Family Goals: DAWIT    Progress made toward(s) clinical / shift goals:    Problem: Pain - Standard  Goal: Alleviation of pain or a reduction in pain to the patient’s comfort goal  Outcome: Progressing     Problem: Knowledge Deficit - Standard  Goal: Patient and family/care givers will demonstrate understanding of plan of care, disease process/condition, diagnostic tests and medications  Outcome: Progressing       Patient is not progressing towards the following goals:

## 2022-04-08 NOTE — CARE PLAN
The patient is Stable - Low risk of patient condition declining or worsening    Shift Goals  Clinical Goals: discharge  Patient Goals: discharge  Family Goals: discharge    Progress made toward(s) clinical / shift goals:  discharge     Patient is not progressing towards the following goals:    Pt is A&Ox4. VSS. Weaned from 2L NC to RA, tolerates well at rest and at work. No complaints of pain. Pt medically read to discharge per hospitalist and cardiology. Appointments set up. Discharge teaching will be reviewed with pt and daughter when she arrives.

## 2022-04-08 NOTE — HEART FAILURE PROGRAM
Appointments appropriate:  Your appointments    Apr 12, 2022  8:45 AM   Heart Failure New Patient with MELI Benson   [facility] (--) [address], [address]   [place] [address]   [phone]      Apr 14, 2022  9:00 AM   Established Patient with Mitesh Sorto M.D.   [facility] [place] [address]   [place] [id number] [phone]     toprol xl   Rosuvastatin  lantus  spironolactone  Losartan  Metformin  Previous PNA vax  Influenza vax out of season  Still no atrial arrhythmia dx  Multiple provider note documentation of nicotine cessation counseling   awaiting bed, assessment change

## 2022-04-08 NOTE — DIETARY
Nutrition Services: Day 6 of admit.  Pac Keith is a 66 y.o. male with admitting DX of Anasarca, Congestive heart failure of unknown etiology (HCC)  Consult received for cardiac rehab diet education. S/p PCI.  RD provided necessary diet education to pt on 4/5 per prior consult. See Education tab for documentation.

## 2022-04-08 NOTE — DISCHARGE PLANNING
Anticipated Discharge Disposition: Home    Action: RN CM met with the patient at bedside to deliver the 2nd IMM with the assistance of  services (Alma Delia [phone]), patient signed the IMM at 1400.  FANTASMA GARLAND spoke with the patient's daughter over the phone, she explained that she can come  the patient after work around 1730.  RN CM updated meds to bed with the discharge time.    Barriers to Discharge: None     Plan: Case coordination available to discuss any further discharge barriers.

## 2022-04-08 NOTE — DISCHARGE INSTRUCTIONS
Discharge Instructions    Discharged to home by car with relative. Discharged via wheelchair, hospital escort: Yes.  Special equipment needed: Not Applicable    Be sure to schedule a follow-up appointment with your primary care doctor or any specialists as instructed.     Discharge Plan:        I understand that a diet low in cholesterol, fat, and sodium is recommended for good health. Unless I have been given specific instructions below for another diet, I accept this instruction as my diet prescription.   Other diet: cardiac/diabetic     Special Instructions:   HF Patient Discharge Instructions  · Monitor your weight daily, and maintain a weight chart, to track your weight changes.   · Activity as tolerated, unless your Doctor has ordered otherwise. Other activity order: .  · Follow a low fat, low cholesterol, low salt diet unless instructed otherwise by your Doctor. Read the labels on the back of food products and track your intake of fat, cholesterol and salt.   · Fluid Restriction No. If a Fluid Restriction has been ordered by your Doctor, measure fluids with a measuring cup to ensure that you are not exceeding the restriction.   · No smoking.  · Oxygen No. If your Doctor has ordered that you wear Oxygen at home, it is important to wear it as ordered.  · Did you receive an explanation from staff on the importance of taking each of your medications and why it is necessary to keep taking them unless your doctor says to stop? Yes  · Were all of your questions answered about how to manage your heart failure and what to do if you have increased signs and symptoms after you go home? Yes  · Do you feel like your heart failure care team involved you in the care treatment plan and allowed you to make decisions regarding your care while in the hospital and addressed any discharge needs you might have? Yes    See the educational handout provided at discharge for more information on monitoring your daily weight, activity  and diet. This also explains more about Heart Failure, symptoms of a flare-up and some of the tests that you have undergone.     Warning Signs of a Flare-Up include:  · Swelling in the ankles or lower legs.  · Shortness of breath, while at rest, or while doing normal activities.   · Shortness of breath at night when in bed, or coughing in bed.   · Requiring more pillows to sleep at night, or needing to sit up at night to sleep.  · Feeling weak, dizzy or fatigued.     When to call your Doctor:  · Call [facility] seven days a week from 8:00 a.m. to 8:00 p.m. for medical questions [phone].  · Call your Primary Care Physician or Cardiologist if:   1. You experience any pain radiating to your jaw or neck.  2. You have any difficulty breathing.  3. You experience weight gain of 3 lbs in a day or 5 lbs in a week.   4. You feel any palpitations or irregular heartbeats.  5. You become dizzy or lose consciousness.   If you have had an angiogram or had a pacemaker or AICD placed, and experience:  1. Bleeding, drainage or swelling at the surgical / puncture site.  2. Fever greater than 100.0 F  3. Shock from internal defibrillator.  4. Cool and / or numb extremities.      · Is patient discharged on Warfarin / Coumadin?   No     Depression / Suicide Risk    As you are discharged from this [facility], it is important to learn how to keep safe from harming yourself.    Recognize the warning signs:  · Abrupt changes in personality, positive or negative- including increase in energy   · Giving away possessions  · Change in eating patterns- significant weight changes-  positive or negative  · Change in sleeping patterns- unable to sleep or sleeping all the time   · Unwillingness or inability to communicate  · Depression  · Unusual sadness, discouragement and loneliness  · Talk of wanting to die  · Neglect of personal appearance   · Rebelliousness- reckless behavior  · Withdrawal from people/activities  they love  · Confusion- inability to concentrate     If you or a loved one observes any of these behaviors or has concerns about self-harm, here's what you can do:  · Talk about it- your feelings and reasons for harming yourself  · Remove any means that you might use to hurt yourself (examples: pills, rope, extension cords, firearm)  · Get professional help from the community (Mental Health, Substance Abuse, psychological counseling)  · Do not be alone:Call your Safe Contact- someone whom you trust who will be there for you.  · Call your local CRISIS HOTLINE 863-5949 or 304-133-9888  · Call your local Children's Mobile Crisis Response Team [place] (896) 108-3095 or www.[address]  · Call the toll free National Suicide Prevention Hotlines   · National Suicide Prevention Lifeline 787-466-NFKB (4237)  · National Hope Line Network 800-SUICIDE (524-2192)    Obtain BMP prior to follow up visit 4/12/2022.

## 2022-04-08 NOTE — THERAPY
"Physical Therapy   Initial Evaluation     Patient Name: Chucky Keith  Age:  66 y.o., Sex:  male  Medical Record #: [id number]  Today's Date: 4/8/2022     Precautions  Precautions: Fall Risk;Swallow Precautions ( See Comments)    Assessment  Patient is 66 y.o. male presenting for HFrEF (EF20%) now POD1 LHC and PCI RCA and LAD w/ a PMH of R inguinal hernia and DMII.  He lives w/ his wife, son and dtr, reporting someone will always be available to assist him w/ ADL's/IADL's when he returns home (see flowsheet for PLOF and home set up).  Currently, the pt is limited by reduced activity tolerance and scrotal discomfort, however able to demo functional independence.  He demo's bed mobility spv w/ hob flat and no bed rail, and STS eob w/ no AD spv.  The pt demo'd gait distance 250' using no AD via wide AUBREY, limited toe clearance, slow lucia, and excessive ER BLE d/t scrotal edema, no LOB observed and distance limited by + \"talk test\".  The pt displays ability to ascend/descend steps to access home as demo'd by SLS >3s BLE w/ no UE support.  Provided pt edu regarding phase 1 of cardiac rehab, use of \"talk test\", and home activity modifications w/ good carryover demo'd.  Recommend OP cardiac rehab given pt's dx, functional independence demo'd, and support available at home.  Will not follow, please re consult should there be a change in the pt's condition.      Plan    Recommend Physical Therapy for Evaluation only     DC Equipment Recommendations: None  Discharge Recommendations: Other - (OP cardiac rehab)       Subjective/Objective       04/08/22 1106   Prior Living Situation   Prior Services Home-Independent   Housing / Facility 1 Story House   Steps Into Home 2   Steps In Home 0   Equipment Owned None   Lives with - Patient's Self Care Capacity Spouse;Adult Children   Comments pt lives w/ wife, son and dtr, reports someone is always available to assist as needed   Prior Level of Functional Mobility   Bed Mobility Independent " "  Transfer Status Independent   Ambulation Independent   Distance Ambulation (Feet)   (community distances)   Assistive Devices Used None   Stairs Independent   History of Falls   History of Falls No   Date of Last Fall   (pt denies)   Cognition    Cognition / Consciousness WDL   Level of Consciousness Alert   Comments pleasant and cooperative   Passive ROM Lower Body   Passive ROM Lower Body WDL   Active ROM Lower Body    Active ROM Lower Body  WDL   Comments observed during functional mobility   Strength Lower Body   Lower Body Strength  WDL   Comments as above   Sensation Lower Body   Lower Extremity Sensation   WDL   Comments pt denies numbness/tingling in LEs   Coordination Lower Body    Coordination Lower Body  WDL   Balance Assessment   Sitting Balance (Static) Good   Sitting Balance (Dynamic) Good   Standing Balance (Static) Fair +   Standing Balance (Dynamic) Fair +   Weight Shift Sitting Good   Weight Shift Standing Good   Comments stand w/ no AD   Gait Analysis   Gait Level Of Assist Supervised   Assistive Device None   Distance (Feet) 250   # of Times Distance was Traveled 1   Deviation Bradykinetic;Decreased Toe Off;Other (Comment);Increased Base Of Support  (excessive ER BLE (d/t scrotal edema))   Weight Bearing Status no restrictions   Vision Deficits Impacting Mobility denies   Comments distance limited by + \"talk test\"   Bed Mobility    Supine to Sit Supervised   Sit to Supine Supervised   Scooting Supervised   Rolling Supervised   Comments hob flat, no bed rails   Functional Mobility   Sit to Stand Supervised   Bed, Chair, Wheelchair Transfer Supervised   Transfer Method Stand Step   Mobility STS eob w/ no AD   Activity Tolerance   Sitting Edge of Bed 15min   Standing 10min   Edema / Skin Assessment   Edema / Skin  Not Assessed   Education Group   Education Provided Role of Physical Therapist  (cardiac rehab edu)   Role of Physical Therapist Patient Response " Patient;Acceptance;Explanation;Demonstration;Action Demonstration   Additional Comments pt receptive of [facility] provided   Problem List    Problems Decreased Activity Tolerance   Session Information   Date / Session Number  4/8- eval only

## 2022-04-08 NOTE — DISCHARGE SUMMARY
Discharge Summary    CHIEF COMPLAINT ON ADMISSION  Chief Complaint   Patient presents with   • Shortness of Breath     X few days. Noted breathing slightly labored.    • Testicle Swelling     X 3 days   • Scrotal Pain     X 3 days. Rates pain as8/10   • Leg Swelling     In left leg x 20 days       Reason for Admission  Groin Swelling     Admission Date  4/2/2022    CODE STATUS  Full Code    HPI & HOSPITAL COURSE  This is a 66 y.o. male who presented 4/2/2022 with generalized edema and right scrotal swelling.  He reports that over the past month he has noted increasing swelling and over the past few days he has noted progressive dyspnea on exertion associated with orthopnea.  He denies any fever or chills.  He became concerned about the increasing scrotal swelling that he presented to the emergency department.  He has a history of diabetes dyslipidemia and hypertension.  He denies any known history of cardiac disease.  No trauma.  No fever no chills.  His diabetes has been controlled on Lantus.  Patient was admitted for further treatment and work up. An echo was done showing an Ef of 20%. He was started on aggressive diuresis. Cardiology was consulted and patient ahd a cardiac cath showing CAD involving the LAD and RCA. Patient had PCI. He has been started on dual antiplatelet therapy   Patient now doing well he is also sating well on room air and no need for oxygen.  Patient will be discharged home today.He will need to follow up as outpatient with cardiology     The patient met 2-midnight criteria for an inpatient stay at the time of discharge.    Discharge Date  4/8/22    FOLLOW UP ITEMS POST DISCHARGE  Cardiology     DISCHARGE DIAGNOSES  Principal Problem:    Anasarca (Chronic) POA: Yes  Active Problems:    Acute systolic heart failure (HCC) POA: Unknown    Dyslipidemia (Chronic) POA: Yes    Diabetic polyneuropathy associated with type 2 diabetes mellitus (HCC) POA: Yes    Essential hypertension (Chronic) POA:  Yes    Anemia POA: Yes      Overview: Hg 10.1> ORDered ffib fobt, referred GI @@    Congestive heart failure of unknown etiology (HCC) (Chronic) POA: Yes    Leg edema POA: Unknown    Right inguinal hernia POA: Unknown    Pelvic fluid collection POA: Unknown    Tobacco dependence POA: Unknown    Elevated troponin POA: Unknown  Resolved Problems:    * No resolved hospital problems. *      FOLLOW UP  Future Appointments   Date Time Provider Department Center   4/12/2022  8:45 AM MELI Benson [facility] None   4/14/2022  9:00 AM Mitesh Sorto M.D. [facility]     No follow-up provider specified.    MEDICATIONS ON DISCHARGE     Medication List      START taking these medications      Instructions   aspirin 81 MG EC tablet  Start taking on: April 9, 2022   Take 1 Tablet by mouth every day.  Dose: 81 mg     clopidogrel 75 MG Tabs  Start taking on: April 9, 2022  Commonly known as: PLAVIX   Take 1 Tablet by mouth every day.  Dose: 75 mg     furosemide 20 MG Tabs  Start taking on: April 9, 2022  Commonly known as: LASIX   Take 1 Tablet by mouth every day.  Dose: 20 mg     metoprolol SR 25 MG Tb24  Start taking on: April 9, 2022  Commonly known as: TOPROL XL   Take 1 Tablet by mouth every day.  Dose: 25 mg     rosuvastatin 40 MG tablet  Commonly known as: CRESTOR   Take 1 Tablet by mouth every evening.  Dose: 40 mg     spironolactone 25 MG Tabs  Start taking on: April 9, 2022  Commonly known as: ALDACTONE   Take 0.5 Tablets by mouth every day.  Dose: 12.5 mg        CHANGE how you take these medications      Instructions   Lantus SoloStar 100 UNIT/ML Sopn injection  What changed:   · how much to take  · how to take this  · when to take this  Generic drug: insulin glargine   INJECT 30 UNITS SUBCUTANEOUSLY EVERY NIGHT AT BEDTIME     meloxicam 15 MG tablet  What changed: when to take this  Commonly known as: MOBIC   Take 1 Tablet by mouth 1 time a day as needed.  Dose: 15 mg        CONTINUE taking these medications       Instructions   benzonatate 100 MG Caps  Commonly known as: TESSALON   Take 1 Capsule by mouth 3 times a day as needed for Cough.  Dose: 100 mg     losartan 25 MG Tabs  Commonly known as: COZAAR   Take 1 Tablet by mouth every day.  Dose: 25 mg     metFORMIN 500 MG Tabs  Commonly known as: GLUCOPHAGE   Take 1 Tablet by mouth 2 times a day with meals.  Dose: 500 mg     vitamin D 1000 Unit (25 mcg) Tabs  Commonly known as: cholecalciferol   Take 2 Tabs by mouth every day.  Dose: 2,000 Units        STOP taking these medications    amoxicillin-clavulanate 875-125 MG Tabs  Commonly known as: AUGMENTIN     simvastatin 40 MG Tabs  Commonly known as: ZOCOR            Allergies  No Known Allergies    DIET  Orders Placed This Encounter   Procedures   • Diet Order Diet: Cardiac     Standing Status:   Standing     Number of Occurrences:   1     Order Specific Question:   Diet:     Answer:   Cardiac [6]       ACTIVITY  As tolerated.  Weight bearing as tolerated    CONSULTATIONS  Cardiology     PROCEDURES  Cardiac cath     LABORATORY  Lab Results   Component Value Date    SODIUM 130 (L) 04/08/2022    POTASSIUM 4.1 04/08/2022    CHLORIDE 96 04/08/2022    CO2 23 04/08/2022    GLUCOSE 308 (H) 04/08/2022    BUN 26 (H) 04/08/2022    CREATININE 1.18 04/08/2022    CREATININE 0.9 12/29/2005        Lab Results   Component Value Date    WBC 8.2 04/08/2022    HEMOGLOBIN 10.9 (L) 04/08/2022    HEMATOCRIT 34.4 (L) 04/08/2022    PLATELETCT 246 04/08/2022        Total time of the discharge process exceeds 41 minutes.

## 2022-04-11 ENCOUNTER — HOSPITAL ENCOUNTER (OUTPATIENT)
Dept: LAB | Facility: MEDICAL CENTER | Age: 67
End: 2022-04-11
Attending: NURSE PRACTITIONER
Payer: MEDICARE

## 2022-04-11 ENCOUNTER — PATIENT OUTREACH (OUTPATIENT)
Dept: MEDICAL GROUP | Facility: PHYSICIAN GROUP | Age: 67
End: 2022-04-11
Payer: COMMERCIAL

## 2022-04-11 DIAGNOSIS — I50.9 CONGESTIVE HEART FAILURE OF UNKNOWN ETIOLOGY (HCC): Chronic | ICD-10-CM

## 2022-04-11 LAB
ANION GAP SERPL CALC-SCNC: 13 MMOL/L (ref 7–16)
BUN SERPL-MCNC: 19 MG/DL (ref 8–22)
CALCIUM SERPL-MCNC: 9.4 MG/DL (ref 8.5–10.5)
CHLORIDE SERPL-SCNC: 105 MMOL/L (ref 96–112)
CO2 SERPL-SCNC: 20 MMOL/L (ref 20–33)
CREAT SERPL-MCNC: 0.9 MG/DL (ref 0.5–1.4)
FASTING STATUS PATIENT QL REPORTED: NORMAL
GFR SERPLBLD CREATININE-BSD FMLA CKD-EPI: 93 ML/MIN/1.73 M 2
GLUCOSE SERPL-MCNC: 128 MG/DL (ref 65–99)
POTASSIUM SERPL-SCNC: 4.6 MMOL/L (ref 3.6–5.5)
SODIUM SERPL-SCNC: 138 MMOL/L (ref 135–145)

## 2022-04-11 PROCEDURE — 36415 COLL VENOUS BLD VENIPUNCTURE: CPT

## 2022-04-11 PROCEDURE — 80048 BASIC METABOLIC PNL TOTAL CA: CPT

## 2022-04-11 NOTE — PROGRESS NOTES
Patient outreach for Transitional Care Management.  Daughter answered the phone and spoke per her father as he does not speak english very well.  He is feeling well.  He is taking his medications as prescribed but did hold his insulin due to low BS.  His daughter stated that he has an appointment on 4/14 at 0900a for follow up with PCP. No questions on discharge.

## 2022-04-12 ENCOUNTER — OFFICE VISIT (OUTPATIENT)
Dept: CARDIOLOGY | Facility: MEDICAL CENTER | Age: 67
End: 2022-04-12
Payer: MEDICARE

## 2022-04-12 VITALS
DIASTOLIC BLOOD PRESSURE: 52 MMHG | HEIGHT: 63 IN | WEIGHT: 139 LBS | RESPIRATION RATE: 18 BRPM | BODY MASS INDEX: 24.63 KG/M2 | HEART RATE: 81 BPM | SYSTOLIC BLOOD PRESSURE: 98 MMHG | OXYGEN SATURATION: 98 %

## 2022-04-12 DIAGNOSIS — I50.22 CHF (CONGESTIVE HEART FAILURE), NYHA CLASS III, CHRONIC, SYSTOLIC (HCC): ICD-10-CM

## 2022-04-12 DIAGNOSIS — I50.1 ACC/AHA STAGE C LEFT HEART FAILURE WITH DECREASED EJECTION FRACTION (HCC): ICD-10-CM

## 2022-04-12 DIAGNOSIS — I10 ESSENTIAL HYPERTENSION: Chronic | ICD-10-CM

## 2022-04-12 DIAGNOSIS — I25.5 ISCHEMIC CARDIOMYOPATHY: ICD-10-CM

## 2022-04-12 DIAGNOSIS — Z95.5 STATUS POST CORONARY ARTERY STENT PLACEMENT: ICD-10-CM

## 2022-04-12 DIAGNOSIS — E78.5 DYSLIPIDEMIA: Chronic | ICD-10-CM

## 2022-04-12 DIAGNOSIS — R06.09 OTHER FORMS OF DYSPNEA: ICD-10-CM

## 2022-04-12 DIAGNOSIS — Z79.899 HIGH RISK MEDICATION USE: ICD-10-CM

## 2022-04-12 DIAGNOSIS — I50.21 ACUTE SYSTOLIC HEART FAILURE (HCC): ICD-10-CM

## 2022-04-12 DIAGNOSIS — E11.42 DIABETIC POLYNEUROPATHY ASSOCIATED WITH TYPE 2 DIABETES MELLITUS (HCC): ICD-10-CM

## 2022-04-12 DIAGNOSIS — I50.20 ACC/AHA STAGE C SYSTOLIC HEART FAILURE (HCC): ICD-10-CM

## 2022-04-12 PROCEDURE — 94618 PULMONARY STRESS TESTING: CPT | Performed by: NURSE PRACTITIONER

## 2022-04-12 PROCEDURE — 99215 OFFICE O/P EST HI 40 MIN: CPT | Mod: 25 | Performed by: NURSE PRACTITIONER

## 2022-04-12 RX ORDER — METOPROLOL SUCCINATE 25 MG/1
25 TABLET, EXTENDED RELEASE ORAL DAILY
Qty: 90 TABLET | Refills: 3 | Status: SHIPPED | OUTPATIENT
Start: 2022-04-12 | End: 2022-04-26 | Stop reason: SDUPTHER

## 2022-04-12 RX ORDER — ROSUVASTATIN CALCIUM 40 MG/1
40 TABLET, COATED ORAL EVERY EVENING
Qty: 90 TABLET | Refills: 3 | Status: SHIPPED | OUTPATIENT
Start: 2022-04-12 | End: 2022-04-26 | Stop reason: SDUPTHER

## 2022-04-12 RX ORDER — SPIRONOLACTONE 25 MG/1
12.5 TABLET ORAL DAILY
Qty: 45 TABLET | Refills: 3 | Status: SHIPPED | OUTPATIENT
Start: 2022-04-12 | End: 2022-07-06 | Stop reason: SDUPTHER

## 2022-04-12 RX ORDER — CLOPIDOGREL BISULFATE 75 MG/1
75 TABLET ORAL DAILY
Qty: 90 TABLET | Refills: 3 | Status: SHIPPED | OUTPATIENT
Start: 2022-04-12 | End: 2022-04-26 | Stop reason: SDUPTHER

## 2022-04-12 RX ORDER — FUROSEMIDE 20 MG/1
20 TABLET ORAL DAILY
Qty: 90 TABLET | Refills: 2 | Status: SHIPPED | OUTPATIENT
Start: 2022-04-12 | End: 2022-05-10 | Stop reason: SDUPTHER

## 2022-04-12 ASSESSMENT — MINNESOTA LIVING WITH HEART FAILURE QUESTIONNAIRE (MLHF)
DIFFICULTY TO CONCENTRATE OR REMEMBERING THINGS: 3
DIFFICULTY WORKING TO EARN A LIVING: 0
MAKING YOU WORRY: 0
LOSS OF SELF CONTROL IN YOUR LIFE: 0
SWELLING IN ANKLES OR LEGS: 0
TOTAL_SCORE: 29
DIFFICULTY WITH SEXUAL ACTIVITIES: 0
DIFFICULTY SOCIALIZING WITH FAMILY OR FRIENDS: 0
TIRED, FATIGUED OR LOW ON ENERGY: 4
DIFFICULTY WITH RECREATIONAL PASTIMES, SPORTS, HOBBIES: 0
FEELING LIKE A BURDEN TO FAMILY AND FRIENDS: 0
COSTING YOU MONEY FOR MEDICAL CARE: 3
GIVING YOU SIDE EFFECTS FROM TREATMENTS: 0
MAKING YOU SHORT OF BREATH: 2
HAVING TO SIT OR LIE DOWN DURING THE DAY: 2
EATING LESS FOODS YOU LIKE: 3
MAKING YOU FEEL DEPRESSED: 2
MAKING YOU STAY IN A HOSPITAL: 3
DIFFICULTY GOING AWAY FROM HOME: 2
WORKING AROUND THE HOUSE OR YARD DIFFICULT: 2
WALKING ABOUT OR CLIMBING STAIRS DIFFICULT: 1
DIFFICULTY SLEEPING WELL AT NIGHT: 2

## 2022-04-12 ASSESSMENT — FIBROSIS 4 INDEX: FIB4 SCORE: 1.49

## 2022-04-12 NOTE — PROGRESS NOTES
"Chief Complaint   Patient presents with   • Shortness of Breath   • HTN (Controlled)   • Congestive Heart Failure      services were used in the patient's primary language of Cantonese.     Name or Number: [id number]  Mode of interpretation: iPad    Content of Interpretation:  Per below    Subjective     Pac Keith is a 66 y.o. male who presents today with his wife as heart failure new patient with EF of 20% on echocardiogram.  Patient was recently admitted on 4/2/2020 for new onset HF.  Patient has past medical history of T2DM, hypertension, dyslipidemia, ALONSO.  Patient's etiology of HF was determined to be ischemic.  Patient is s/p PCI to midLAD and RCA.    Today, patient reports his swelling has significantly improved since discharge from the hospital, that now his ankle skin has \"wrinkles.\" Patient also notes shortness of breath for long distances.  Patient denies chest pain, shortness of breath, palpitations, dizziness/lightheadedness, orthopnea, PND or Edema.  Patient does not have a scale or blood pressure cuff at home for continued monitoring.  Scale will be provided in office today with daily weight log; discussed importance.  Reviewed medication list; patient endorses medication compliance.    Based on physical examination findings, patient is euvolemic. No JVD, lungs are clear to auscultation, no pitting edema in bilateral lower extremities, no ascites. Dry weight is 139 lbs.     4/13/2022: Initial 6 minute walk test, patient was able to complete 301 m during initial 6 minute walk test. Patient's O2 saturation at baseline was 98% and at the end of the test, the O2 saturation was 96%. Patient reported level 1 of dyspnea on Corinne scale.  Patient was able to walk for 6 minutes.  MLWHF score 29    Lengthy discussion regarding heart failure self-care and HF OMT.  We will continue patient's medications as prescribed with refills provided in office today.  Patient to follow-up with pharmacotherapy " clinic for SGLT2 I consideration and HF OMT.  Advanced directive planning packet provided today in office; patient to review with daughter.  Patient to follow-up in 4 weeks in HF clinic.    Past Medical History:   Diagnosis Date   • Diabetes (HCC)    • DM type 2 (diabetes mellitus, type 2) (HCC) 2016   • Essential hypertension 2016   • GERD (gastroesophageal reflux disease) 2016   • Hepatitis B 2016   • Hyperlipidemia 2016   • Vitamin D deficiency 2016     History reviewed. No pertinent surgical history.  Family History   Problem Relation Age of Onset   • Heart Disease Neg Hx      Social History     Socioeconomic History   • Marital status:      Spouse name: Not on file   • Number of children: Not on file   • Years of education: Not on file   • Highest education level: Not on file   Occupational History   • Not on file   Tobacco Use   • Smoking status: Current Every Day Smoker     Packs/day: 0.50     Types: Cigarettes     Last attempt to quit: 12/3/2016     Years since quittin.3   • Smokeless tobacco: Never Used   • Tobacco comment: 10 CIGARETTES A DAY   Vaping Use   • Vaping Use: Never used   Substance and Sexual Activity   • Alcohol use: No     Alcohol/week: 0.0 oz   • Drug use: No   • Sexual activity: Not on file   Other Topics Concern   • Not on file   Social History Narrative   • Not on file     Social Determinants of Health     Financial Resource Strain: Not on file   Food Insecurity: Not on file   Transportation Needs: Not on file   Physical Activity: Not on file   Stress: Not on file   Social Connections: Not on file   Intimate Partner Violence: Not on file   Housing Stability: Not on file     No Known Allergies  Outpatient Encounter Medications as of 2022   Medication Sig Dispense Refill   • clopidogrel (PLAVIX) 75 MG Tab Take 1 Tablet by mouth every day. 90 Tablet 3   • furosemide (LASIX) 20 MG Tab Take 1 Tablet by mouth every day. 90 Tablet 2   • metoprolol SR  (TOPROL XL) 25 MG TABLET SR 24 HR Take 1 Tablet by mouth every day. 90 Tablet 3   • rosuvastatin (CRESTOR) 40 MG tablet Take 1 Tablet by mouth every evening. 90 Tablet 3   • spironolactone (ALDACTONE) 25 MG Tab Take 0.5 Tablets by mouth every day. 45 Tablet 3   • aspirin 81 MG EC tablet Take 1 Tablet by mouth every day. 30 Tablet 0   • insulin glargine (LANTUS SOLOSTAR) 100 UNIT/ML Solution Pen-injector injection INJECT 30 UNITS SUBCUTANEOUSLY EVERY NIGHT AT BEDTIME (Patient taking differently: Inject 15 Units under the skin every evening. INJECT 30 UNITS SUBCUTANEOUSLY EVERY NIGHT AT BEDTIME) 5 Each 6   • meloxicam (MOBIC) 15 MG tablet Take 1 Tablet by mouth 1 time a day as needed. (Patient taking differently: Take 15 mg by mouth every day.) 30 Tablet 3   • metFORMIN (GLUCOPHAGE) 500 MG Tab Take 1 Tablet by mouth 2 times a day with meals. 60 Tablet 6   • benzonatate (TESSALON) 100 MG Cap Take 1 Capsule by mouth 3 times a day as needed for Cough. 60 Capsule 0   • losartan (COZAAR) 25 MG Tab Take 1 Tablet by mouth every day. 30 Tablet 6   • vitamin D (CHOLECALCIFEROL) 1000 UNIT Tab Take 2 Tabs by mouth every day. 30 Tab 3   • [DISCONTINUED] clopidogrel (PLAVIX) 75 MG Tab Take 1 Tablet by mouth every day. 30 Tablet 0   • [DISCONTINUED] furosemide (LASIX) 20 MG Tab Take 1 Tablet by mouth every day. 60 Tablet 0   • [DISCONTINUED] metoprolol SR (TOPROL XL) 25 MG TABLET SR 24 HR Take 1 Tablet by mouth every day. 30 Tablet 0   • [DISCONTINUED] rosuvastatin (CRESTOR) 40 MG tablet Take 1 Tablet by mouth every evening. 30 Tablet 11   • [DISCONTINUED] spironolactone (ALDACTONE) 25 MG Tab Take 0.5 Tablets by mouth every day. 30 Tablet 3     No facility-administered encounter medications on file as of 4/12/2022.     Review of Systems   Constitutional: Negative for fever, malaise/fatigue and weight loss.   Eyes: Negative for blurred vision.   Respiratory: Positive for shortness of breath (WALKER). Negative for cough.   "  Cardiovascular: Negative for chest pain, palpitations, orthopnea, claudication, leg swelling and PND.   Gastrointestinal: Negative for abdominal pain, blood in stool, nausea and vomiting.   Genitourinary: Negative for dysuria, frequency and hematuria.   Musculoskeletal: Negative for falls and myalgias.   Neurological: Negative for dizziness, tingling and loss of consciousness.   Endo/Heme/Allergies: Does not bruise/bleed easily.              Objective     BP (!) 98/52 (BP Location: Left arm, Patient Position: Sitting, BP Cuff Size: Adult)   Pulse 81   Resp 18   Ht 1.6 m (5' 3\")   Wt 63 kg (139 lb)   SpO2 98%   BMI 24.62 kg/m²     Physical Exam  Vitals reviewed.   Constitutional:       Appearance: He is well-developed.   HENT:      Head: Normocephalic and atraumatic.   Eyes:      Pupils: Pupils are equal, round, and reactive to light.   Neck:      Vascular: No JVD.   Cardiovascular:      Rate and Rhythm: Normal rate and regular rhythm.      Heart sounds: Normal heart sounds. No murmur heard.    No friction rub. No gallop.   Pulmonary:      Effort: Pulmonary effort is normal. No respiratory distress.      Breath sounds: Normal breath sounds.   Abdominal:      General: Bowel sounds are normal. There is no distension.      Palpations: Abdomen is soft.   Musculoskeletal:      Right lower leg: No edema.      Left lower leg: No edema.   Skin:     General: Skin is warm and dry.      Findings: No erythema.   Neurological:      General: No focal deficit present.      Mental Status: He is alert and oriented to person, place, and time. Mental status is at baseline.   Psychiatric:         Mood and Affect: Mood normal.         Behavior: Behavior normal.            Lab Results   Component Value Date/Time    CHOLSTRLTOT 91 (L) 03/28/2022 12:21 PM    LDL 48 03/28/2022 12:21 PM    HDL 34 (A) 03/28/2022 12:21 PM    TRIGLYCERIDE 46 03/28/2022 12:21 PM       Lab Results   Component Value Date/Time    SODIUM 138 04/11/2022 11:26 " AM    POTASSIUM 4.6 04/11/2022 11:26 AM    CHLORIDE 105 04/11/2022 11:26 AM    CO2 20 04/11/2022 11:26 AM    GLUCOSE 128 (H) 04/11/2022 11:26 AM    BUN 19 04/11/2022 11:26 AM    CREATININE 0.90 04/11/2022 11:26 AM    CREATININE 0.9 12/29/2005 04:08 PM     Lab Results   Component Value Date/Time    ALKPHOSPHAT 84 04/02/2022 11:31 AM    ASTSGOT 30 04/02/2022 11:31 AM    ALTSGPT 31 04/02/2022 11:31 AM    TBILIRUBIN 0.8 04/02/2022 11:31 AM      Echocardiogram: 4/3/2022  Severely reduced left ventricular systolic function.  Global hypokinesis, relatively preserved wall motion laterally.  Grade III diastolic dysfunction.  Dilated IVC without inspiratory collapse.  Reduced right ventricular systolic function.  Moderate tricuspid regurgitation.  Estimated right ventricular systolic pressure is 62  mmHg.  Incidental note of right atrial mass - most consistent with a prominent   eustachian valve (normal anatomic variation); correlate clinically.    [facility] (4/4/2022): Findings:  1.  Left main coronary artery:  Normal.  2.  Left anterior descending artery: Mid LAD has 80% calcific stenosis after first diagonal takeoff.  First diagonal has luminal irregularities.  Second diagonal is smaller branch, has 70% stenosis in proximal portion  3.  Left circumflex coronary artery: Proximal, midportion, large first marginal free of significant disease.  Distal AV groove branch has moderate stenosis.  4.  Right coronary artery: 60-70% stenosis in proximal portion.  Diffuse 60% stenosis in midportion.  Right posterolateral branch is diffusely diseased with a focal 70% stenosis in the midportion .  This is a right dominant system.  5.  Left ventricular end diastolic pressure:  19 mmHg.  No signficant gradient across the aortic valve.    [facility] (4/7/2022):   Findings   Hemodynamics:   Aorta: 86/42 mmHg  LVEDP: 10 mmHg  No significant pullback gradient across the aortic valve  Results of intervention to the LAD:  Pre: 80% stenosis and JANETTE III  flow  Post: 0% residual stenosis and JANETTE III flow. No dissection or distal embolization.  Results of intervention to the diagonal 2:  Pre: 90% stenosis and JANETTE III flow  Post: 80% residual stenosis and JANETTE III flow. No dissection or distal embolization.  Results of intervention to the RCA:  Pre: 90% stenosis and JANETTE III flow  Post: 0% residual stenosis and JANETTE III flow. No dissection or distal embolization.  IMPRESSIONS:  1. Successful staged PCI of the RCA, LAD using MARTA, IVUS guidance  2. Successful preservation of the D2 with POBA  3. Normal LVEDP  Recommendations:  Usual post cath care  Dual antiplatelet therapy for 12 months    Assessment & Plan     1. Ischemic cardiomyopathy  Referral to Pharmacotherapy Service   2. Acute systolic heart failure (HCC)  clopidogrel (PLAVIX) 75 MG Tab    furosemide (LASIX) 20 MG Tab    metoprolol SR (TOPROL XL) 25 MG TABLET SR 24 HR    rosuvastatin (CRESTOR) 40 MG tablet    spironolactone (ALDACTONE) 25 MG Tab    Referral to Pharmacotherapy Service   3. Dyslipidemia  Referral to Pharmacotherapy Service   4. Essential hypertension  Referral to Pharmacotherapy Service   5. Diabetic polyneuropathy associated with type 2 diabetes mellitus (HCC)  Referral to Pharmacotherapy Service   6. Status post coronary artery stent placement  Referral to Pharmacotherapy Service   7. ACC/AHA stage C left heart failure with decreased ejection fraction (HCC)  Referral to Pharmacotherapy Service   8. CHF (congestive heart failure), NYHA class III, chronic, systolic (HCC)  Referral to Pharmacotherapy Service   9. Other forms of dyspnea     10. ACC/AHA stage C systolic heart failure (HCC)     11. High risk medication use         Medical Decision Making: Today's Assessment/Status/Plan:        HFrEF, Stage C, Class II-III, LVEF 20%:   -Heart failure due to ischemic cardiomyopathy  -ACE-I/ARB/ARNI: Continue losartan 25 mg daily  -Evidence Based Beta-blocker: Continue metoprolol XL 25 mg  daily  -Aldosterone Antagonist: Continue Spironolactone 12.5 mg daily  -SGLT2-I: Patient referred to pharmacotherapy clinic for Jardiance  -Diuretic: Continue Lasix 20 mg daily  -Labs: Reviewed per above  -Repeat Echo in 3 months, if LVEF not >35%, then will discuss/consider ICD for primary Prevention.   -Reinforced s/sx of worsening heart failure with patient and weight monitoring. Pt verbalizes understanding. Pt to call office if present.    -Heart Failure Education: pt to be contacted by HF nurse for further education,   -Advanced care planning: Advanced directive packet provided in office today.  Patient to discussed with his daughter and wife.  -Scale provided in office today for daily weight monitoring.    CAD s/p PCI to mid LAD and RCA; dyslipidemia  -Continue DAPT for 12 months  -Atorvastatin 40 mg every evening.  LDL 48.  At goal    Hypertension  -Today in office blood pressure is well controlled.   -Encouraged to obtain and continue home BP monitoring/log.  -Medication recommendations per above.    Iron deficiency anemia  -per PCP    FU in clinic in 2 weeks with pharmacotherapy clinic in 1 month with heart failure clinic. Sooner if needed.    Patient verbalizes understanding and agrees with the plan of care.     I personally spent a total of 43 minutes which includes face-to-face time and non-face-to-face time spent on preparing to see the patient, reviewing prior notes and tests, obtaining history from the patient, performing a medically appropriate exam, counseling and educating the patient, ordering medications/tests/procedures/referrals as clinically indicated, and documenting information in the electronic medical record.    AMANDA Benson.   [facility]  [phone]    PLEASE NOTE: This Note was created using voice recognition Software. I have made every reasonable attempt to correct obvious errors, but I expect that there are errors of grammar and  possibly content that I did not discover before finalizing the note

## 2022-04-13 PROBLEM — I50.22 CHRONIC SYSTOLIC CONGESTIVE HEART FAILURE (HCC): Chronic | Status: ACTIVE | Noted: 2022-04-02

## 2022-04-13 PROBLEM — I25.5 ISCHEMIC CARDIOMYOPATHY: Status: ACTIVE | Noted: 2022-04-13

## 2022-04-13 PROBLEM — I50.22 CHRONIC SYSTOLIC CONGESTIVE HEART FAILURE (HCC): Status: ACTIVE | Noted: 2022-04-02

## 2022-04-13 ASSESSMENT — ENCOUNTER SYMPTOMS
NAUSEA: 0
BLOOD IN STOOL: 0
VOMITING: 0
PALPITATIONS: 0
WEIGHT LOSS: 0
TINGLING: 0
ORTHOPNEA: 0
BLURRED VISION: 0
FEVER: 0
SHORTNESS OF BREATH: 1
FALLS: 0
LOSS OF CONSCIOUSNESS: 0
MYALGIAS: 0
DIZZINESS: 0
COUGH: 0
ABDOMINAL PAIN: 0
CLAUDICATION: 0
PND: 0
BRUISES/BLEEDS EASILY: 0

## 2022-04-14 ENCOUNTER — OFFICE VISIT (OUTPATIENT)
Dept: MEDICAL GROUP | Facility: PHYSICIAN GROUP | Age: 67
End: 2022-04-14
Payer: MEDICARE

## 2022-04-14 VITALS
HEIGHT: 63 IN | SYSTOLIC BLOOD PRESSURE: 96 MMHG | RESPIRATION RATE: 17 BRPM | BODY MASS INDEX: 24.63 KG/M2 | OXYGEN SATURATION: 100 % | TEMPERATURE: 99.1 F | WEIGHT: 139 LBS | DIASTOLIC BLOOD PRESSURE: 54 MMHG | HEART RATE: 80 BPM

## 2022-04-14 DIAGNOSIS — E11.42 DIABETIC POLYNEUROPATHY ASSOCIATED WITH TYPE 2 DIABETES MELLITUS (HCC): Chronic | ICD-10-CM

## 2022-04-14 DIAGNOSIS — I25.10 CORONARY ARTERY DISEASE INVOLVING NATIVE HEART WITHOUT ANGINA PECTORIS, UNSPECIFIED VESSEL OR LESION TYPE: ICD-10-CM

## 2022-04-14 DIAGNOSIS — Z23 NEED FOR VACCINATION: ICD-10-CM

## 2022-04-14 DIAGNOSIS — E78.5 DYSLIPIDEMIA: Chronic | ICD-10-CM

## 2022-04-14 DIAGNOSIS — I10 ESSENTIAL HYPERTENSION: Chronic | ICD-10-CM

## 2022-04-14 DIAGNOSIS — D64.9 ANEMIA, UNSPECIFIED TYPE: Chronic | ICD-10-CM

## 2022-04-14 DIAGNOSIS — I50.22 CHRONIC SYSTOLIC CONGESTIVE HEART FAILURE (HCC): Chronic | ICD-10-CM

## 2022-04-14 PROBLEM — F17.200 TOBACCO DEPENDENCE: Chronic | Status: ACTIVE | Noted: 2022-04-02

## 2022-04-14 PROCEDURE — 92250 FUNDUS PHOTOGRAPHY W/I&R: CPT | Performed by: INTERNAL MEDICINE

## 2022-04-14 PROCEDURE — 99496 TRANSJ CARE MGMT HIGH F2F 7D: CPT | Mod: 25 | Performed by: INTERNAL MEDICINE

## 2022-04-14 PROCEDURE — 90732 PPSV23 VACC 2 YRS+ SUBQ/IM: CPT | Performed by: INTERNAL MEDICINE

## 2022-04-14 PROCEDURE — G0009 ADMIN PNEUMOCOCCAL VACCINE: HCPCS | Performed by: INTERNAL MEDICINE

## 2022-04-14 RX ORDER — LOSARTAN POTASSIUM 25 MG/1
12.5 TABLET ORAL DAILY
Qty: 30 TABLET | Refills: 6 | Status: SHIPPED | OUTPATIENT
Start: 2022-04-14 | End: 2023-05-04

## 2022-04-14 RX ORDER — GLUCOSAMINE HCL/CHONDROITIN SU 500-400 MG
CAPSULE ORAL
Qty: 200 EACH | Refills: 6 | Status: SHIPPED | OUTPATIENT
Start: 2022-04-14 | End: 2022-04-14

## 2022-04-14 RX ORDER — GLUCOSAMINE HCL/CHONDROITIN SU 500-400 MG
CAPSULE ORAL
Qty: 200 EACH | Refills: 6 | Status: SHIPPED | OUTPATIENT
Start: 2022-04-14 | End: 2022-04-16

## 2022-04-14 RX ORDER — INSULIN GLARGINE 100 [IU]/ML
15 INJECTION, SOLUTION SUBCUTANEOUS EVERY EVENING
Qty: 5 EACH | Refills: 6 | Status: SHIPPED | OUTPATIENT
Start: 2022-04-14 | End: 2022-12-19

## 2022-04-14 RX ORDER — LANCETS 30 GAUGE
EACH MISCELLANEOUS
Qty: 200 EACH | Refills: 6 | Status: SHIPPED | OUTPATIENT
Start: 2022-04-14 | End: 2022-04-14

## 2022-04-14 RX ORDER — LANCETS 30 GAUGE
EACH MISCELLANEOUS
Qty: 200 EACH | Refills: 6 | Status: SHIPPED | OUTPATIENT
Start: 2022-04-14 | End: 2022-04-19 | Stop reason: SDUPTHER

## 2022-04-14 ASSESSMENT — FIBROSIS 4 INDEX: FIB4 SCORE: 1.49

## 2022-04-14 NOTE — ASSESSMENT & PLAN NOTE
This is a chronic condition.  His previous hemoglobin ranging 9-11's.  Patient denies any history of bleeding.  This patient was referred to GI previously.  Since the cause is unclear.  I will also would like to refer the patient to hematology for further evaluation.  Lab tests ordered today including iron study B12 folate and ferritin and fecal occult blood test.

## 2022-04-14 NOTE — ASSESSMENT & PLAN NOTE
Chronic condition.  Patient presently followed by cardiology service.  She is currently on several medication including spironolactone metoprolol furosemide losartan.  The patient was referred by cardiology to consider Jardiance treatment.

## 2022-04-14 NOTE — ASSESSMENT & PLAN NOTE
Blood pressure is low today patient reported intermittent lightheadedness.  He is currently taking several medication including furosemide losartan metoprolol and spironolactone.    Today I recommend for the patient to reduce the losartan to half a tablet of 25 mg per day.  By the patient return to the clinic for blood pressure recheck and couple weeks.

## 2022-04-14 NOTE — ASSESSMENT & PLAN NOTE
Chronic condition.  The patient is currently on Metformin and Lantus 15 units at bedtime..  Recent A1c 7%.  The patient denies significant hypoglycemic symptoms.

## 2022-04-14 NOTE — ASSESSMENT & PLAN NOTE
Chronic condition for the patient presently followed by cardiology service.  He is currently taking Crestor metoprolol Plavix and aspirin.

## 2022-04-14 NOTE — PROGRESS NOTES
Subjective:     Pac Keith is a 66 y.o. male who presents for Hospital Follow-up.    POST DISCHARGE CALL:  Discharge Date:4/8/2022   Date of Outreach Call: 4/11/2022 10:15 AM  Now that you're home, how are you doing? Good  Do you have questions about your medications? No  Did you fill your medications? Yes  Do you have a follow-up appointment scheduled?Yes  Discharging Department: Telemetry 7  Number of Attempts: 1  Current or previous attempts completed within two business days of discharge? Yes  Provided education regarding treatment plan, medication, self-management, ADLs? Yes  Has patient completed Advance Directive? If yes, advise them to bring to appointment. No  Care Manager phone number provided? Yes  Is there anything else I can help you with? No    HPI:   Recently hospitalized for CHF    Current medicines (including reconciliation performed today)  Current Outpatient Medications   Medication Sig Dispense Refill   • losartan (COZAAR) 25 MG Tab Take 0.5 Tablets by mouth every day. 30 Tablet 6   • Blood Glucose Meter Kit Freestyle FREEDOM Lite.  Check blood sugar  1x per day and prn ssx of high or low sugar 1 Kit 0   • Blood Glucose Test Strips FREESTYLE FREEDOM LITE . Check blood sugar 1x per day and prn ssx of high or low sugar 100 Strip 3   • Lancets Per insurance coverage. Check blood sugar 1x per day and prn ssx of high or low sugar 200 Each 6   • Alcohol Swabs Per insurance coverage. Wipe site with prep pad prior to injection 200 Each 6   • insulin glargine (LANTUS SOLOSTAR) 100 UNIT/ML Solution Pen-injector injection Inject 15 Units under the skin every evening. 5 Each 6   • clopidogrel (PLAVIX) 75 MG Tab Take 1 Tablet by mouth every day. 90 Tablet 3   • furosemide (LASIX) 20 MG Tab Take 1 Tablet by mouth every day. 90 Tablet 2   • metoprolol SR (TOPROL XL) 25 MG TABLET SR 24 HR Take 1 Tablet by mouth every day. 90 Tablet 3   • rosuvastatin (CRESTOR) 40 MG tablet Take 1 Tablet by mouth every evening. 90  "Tablet 3   • spironolactone (ALDACTONE) 25 MG Tab Take 0.5 Tablets by mouth every day. 45 Tablet 3   • aspirin 81 MG EC tablet Take 1 Tablet by mouth every day. 30 Tablet 0   • metFORMIN (GLUCOPHAGE) 500 MG Tab Take 1 Tablet by mouth 2 times a day with meals. 60 Tablet 6   • vitamin D (CHOLECALCIFEROL) 1000 UNIT Tab Take 2 Tabs by mouth every day. 30 Tab 3     No current facility-administered medications for this visit.       Allergies:   Patient has no known allergies.    Social History     Tobacco Use   • Smoking status: Current Every Day Smoker     Packs/day: 0.50     Types: Cigarettes     Last attempt to quit: 12/3/2016     Years since quittin.3   • Smokeless tobacco: Never Used   • Tobacco comment: 10 CIGARETTES A DAY   Vaping Use   • Vaping Use: Never used   Substance Use Topics   • Alcohol use: No     Alcohol/week: 0.0 oz   • Drug use: No       ROS:  Patient denies fever chills shortness of breath or wheezing.  He also denies chest pain palpitation or syncope.    Objective:     Vitals:    22 0910   BP: (!) 96/54   Pulse: 80   Resp: 17   Temp: 37.3 °C (99.1 °F)   TempSrc: Temporal   SpO2: 100%   Weight: 63 kg (139 lb)   Height: 1.6 m (5' 3\")     Body mass index is 24.62 kg/m².    Physical Exam:  Neck no JVP or bruit   Lungs no wheezing noted  Heart regular rate rhythm  Abdomen bowel sounds positive soft nontender.  No mass noted  Extremities small scab formations noted in the right anterior tibia region.  No redness discharge.  No significant swelling on exam today.  Neuro nonfocal    Assessment and Plan:     CAD (coronary artery disease)  Chronic condition for the patient presently followed by cardiology service.  He is currently taking Crestor metoprolol Plavix and aspirin.    Congestive heart failure (HCC)  Chronic condition.  Patient presently followed by cardiology service.  She is currently on several medication including spironolactone metoprolol furosemide losartan.  The patient was referred " by cardiology to consider Jardiance treatment.    Diabetic polyneuropathy associated with type 2 diabetes mellitus (HCC)  Chronic condition.  The patient is currently on Metformin and Lantus 15 units at bedtime..  Recent A1c 7%.  The patient denies significant hypoglycemic symptoms.    Anemia  This is a chronic condition.  His previous hemoglobin ranging 9-11's.  Patient denies any history of bleeding.  This patient was referred to GI previously.  Since the cause is unclear.  I will also would like to refer the patient to hematology for further evaluation.  Lab tests ordered today including iron study B12 folate and ferritin and fecal occult blood test.    Dyslipidemia  Presently the patient is taking Crestor.  No significant side effects reported.    Essential hypertension  Blood pressure is low today patient reported intermittent lightheadedness.  He is currently taking several medication including furosemide losartan metoprolol and spironolactone.    Today I recommend for the patient to reduce the losartan to half a tablet of 25 mg per day.  By the patient return to the clinic for blood pressure recheck and couple weeks.              Other orders  - losartan (COZAAR) 25 MG Tab; Take 0.5 Tablets by mouth every day.  Dispense: 30 Tablet; Refill: 6  - Blood Glucose Meter Kit; Freestyle FREEDOM Lite.  Check blood sugar  1x per day and prn ssx of high or low sugar  Dispense: 1 Kit; Refill: 0  - Blood Glucose Test Strips; FREESTYLE FREEDOM LITE . Check blood sugar 1x per day and prn ssx of high or low sugar  Dispense: 100 Strip; Refill: 3  - Lancets; Per insurance coverage. Check blood sugar 1x per day and prn ssx of high or low sugar  Dispense: 200 Each; Refill: 6  - Alcohol Swabs; Per insurance coverage. Wipe site with prep pad prior to injection  Dispense: 200 Each; Refill: 6  - insulin glargine (LANTUS SOLOSTAR) 100 UNIT/ML Solution Pen-injector injection; Inject 15 Units under the skin every evening.  Dispense: 5  Each; Refill: 6      - Chart and discharge summary were reviewed.   - Hospitalization and results reviewed with patient.   - Medications reviewed including instructions regarding high risk medications, dosing and side effects.        Follow-up:Return in about 8 weeks (around 6/9/2022).    Face-to-face transitional care management services with HIGH (today's visit is within days post discharge & LACE+ score 59+) medical decision complexity were provided.     LACE+ Historical Score Over Time (0-28: Low, 29-58: Medium, 59+: High): 82

## 2022-04-15 ENCOUNTER — TELEPHONE (OUTPATIENT)
Dept: MEDICAL GROUP | Facility: PHYSICIAN GROUP | Age: 67
End: 2022-04-15
Payer: COMMERCIAL

## 2022-04-15 DIAGNOSIS — E11.42 DIABETIC POLYNEUROPATHY ASSOCIATED WITH TYPE 2 DIABETES MELLITUS (HCC): ICD-10-CM

## 2022-04-15 PROBLEM — E11.319 DIABETIC RETINOPATHY ASSOCIATED WITH TYPE 2 DIABETES MELLITUS (HCC): Status: ACTIVE | Noted: 2022-04-15

## 2022-04-15 LAB — RETINAL SCREEN: POSITIVE

## 2022-04-15 NOTE — TELEPHONE ENCOUNTER
----- Message from Mitesh Sorto M.D. sent at 4/15/2022 10:00 AM PDT -----  Pls notify pt    The retinal scan came back abnormal showed pt with diabetic eye disorder  Recommendation: A referral has been submitted to OPHTHALMOLOGIST  for a consultation.  After a few days, please call [facility] Referral Department at [phone] to check the status authorization of the referral.

## 2022-04-15 NOTE — TELEPHONE ENCOUNTER
VOICEMAIL  1. Caller Name: Pac Keith  Call Back Number: [phone] (home)       2. Message: 1st attempt. LVM for pt to cb    3. Patient approves office to leave a detailed voicemail/MyChart message: no

## 2022-04-16 RX ORDER — LORATADINE 10 MG
TABLET ORAL
Qty: 200 EACH | Refills: 5 | Status: SHIPPED | OUTPATIENT
Start: 2022-04-16

## 2022-04-18 NOTE — TELEPHONE ENCOUNTER
VOICEMAIL  1. Caller Name: Pac Keith                      Call Back Number: [phone] (home)     2. Message: 2nd attempt to reach pt. LVM for him to return my call.     3. Patient approves office to leave a detailed voicemail/MyChart message: no

## 2022-04-19 RX ORDER — LANCETS 30 GAUGE
EACH MISCELLANEOUS
Qty: 200 EACH | Refills: 6 | Status: SHIPPED | OUTPATIENT
Start: 2022-04-19

## 2022-04-19 RX ORDER — BLOOD-GLUCOSE METER
EACH MISCELLANEOUS
Qty: 1 KIT | Refills: 0 | Status: SHIPPED | OUTPATIENT
Start: 2022-04-19 | End: 2022-07-12 | Stop reason: SDUPTHER

## 2022-04-19 NOTE — TELEPHONE ENCOUNTER
VOICEMAIL  1. Caller Name: Pac Keith                        Call Back Number: [phone] (home)       2. Message: 3rd attempt to reach pt. LVM for him to cb    3. Patient approves office to leave a detailed voicemail/MyChart message: no    Letter sent.

## 2022-04-19 NOTE — TELEPHONE ENCOUNTER
Received request via: Pharmacy    Was the patient seen in the last year in this department? Yes    Does the patient have an active prescription (recently filled or refills available) for medication(s) requested? Yes. Pharmacy requesting change

## 2022-04-26 ENCOUNTER — NON-PROVIDER VISIT (OUTPATIENT)
Dept: CARDIOLOGY | Facility: MEDICAL CENTER | Age: 67
End: 2022-04-26
Payer: MEDICARE

## 2022-04-26 VITALS
DIASTOLIC BLOOD PRESSURE: 58 MMHG | HEIGHT: 63 IN | WEIGHT: 145 LBS | HEART RATE: 103 BPM | BODY MASS INDEX: 25.69 KG/M2 | SYSTOLIC BLOOD PRESSURE: 112 MMHG

## 2022-04-26 DIAGNOSIS — I50.21 ACUTE SYSTOLIC HEART FAILURE (HCC): ICD-10-CM

## 2022-04-26 PROCEDURE — 99211 OFF/OP EST MAY X REQ PHY/QHP: CPT | Performed by: INTERNAL MEDICINE

## 2022-04-26 RX ORDER — ROSUVASTATIN CALCIUM 40 MG/1
40 TABLET, COATED ORAL EVERY EVENING
Qty: 90 TABLET | Refills: 3 | Status: SHIPPED | OUTPATIENT
Start: 2022-04-26 | End: 2023-05-04

## 2022-04-26 RX ORDER — METOPROLOL SUCCINATE 25 MG/1
25 TABLET, EXTENDED RELEASE ORAL DAILY
Qty: 90 TABLET | Refills: 3 | Status: SHIPPED | OUTPATIENT
Start: 2022-04-26 | End: 2023-05-04

## 2022-04-26 RX ORDER — EMPAGLIFLOZIN 25 MG/1
1 TABLET, FILM COATED ORAL DAILY
Qty: 30 TABLET | Refills: 1 | Status: SHIPPED | OUTPATIENT
Start: 2022-04-26 | End: 2022-07-06

## 2022-04-26 RX ORDER — CLOPIDOGREL BISULFATE 75 MG/1
75 TABLET ORAL DAILY
Qty: 90 TABLET | Refills: 3 | Status: SHIPPED | OUTPATIENT
Start: 2022-04-26 | End: 2023-05-04

## 2022-04-26 RX ORDER — DOCUSATE SODIUM 100 MG/1
100 CAPSULE, LIQUID FILLED ORAL DAILY
Qty: 100 CAPSULE | Refills: 11 | Status: SHIPPED | OUTPATIENT
Start: 2022-04-26 | End: 2022-05-10

## 2022-04-26 ASSESSMENT — FIBROSIS 4 INDEX: FIB4 SCORE: 1.49

## 2022-04-26 NOTE — PATIENT INSTRUCTIONS
INCREASE Furosemide to 2 tablets daily  Start Jardiance 1 tablet daily  Call Emily [phone] for any issues

## 2022-04-26 NOTE — PROGRESS NOTES
CHF Pharmacotherapy visit - Visit  Date of Service: 22  Informed written consent was given on: 2022    Pac Keith is here for CHF.       HPI  Pertinent Interval History since last visit:   Pt recently hospitalized for exacerbation of CHF  Most recent EF:  20%  Changes to laboratory values since last visit:  First visit  Current CHF Medications - including dose:   Entresto or ACE/ARB:losartan 12.5mg po bid  Beta blocker: metoprolol XL 25mg po daily  Diuretic:furosemide 20mg po daily  Aldosterone antagonist: 12.5mg po daily       Changes to weight since last visit:   Pt is up 6 lbs since last visit 14 days ago  Home BP:  Pt has not been checking BP at home    Current Adherence to CHF Therapies:  Complete    SOCIAL HISTORY  Social History     Tobacco Use   Smoking Status Current Every Day Smoker   • Packs/day: 0.50   • Types: Cigarettes   • Last attempt to quit: 12/3/2016   • Years since quittin.3   Smokeless Tobacco Never Used   Tobacco Comment    10 CIGARETTES A DAY      Change in weight: pt is up 6 lbs in 14 days.  Exercise habits: no regular exercise program   Diet: common  diet      DATA REVIEW  Most Recent CMP Panel:   Lab Results   Component Value Date    SODIUM 138 2022    POTASSIUM 4.6 2022    CHLORIDE 105 2022    CO2 20 2022    ANION 13.0 2022    GLUCOSE 128 (H) 2022    BUN 19 2022    CREATININE 0.90 2022    CALCIUM 9.4 2022    ASTSGOT 30 2022    ALTSGPT 31 2022    ALKPHOSPHAT 84 2022    TBILIRUBIN 0.8 2022    ALBUMIN 3.9 2022    AGRATIO 1.2 2022    TSHULTRASEN 3.040 2022       Renal function:  59-60ml/min  Other Pertinent Blood Work:     Other:      Recent Imaging Studies:    None since last visit    ASSESSMENT AND PLAN  Changes to medications:    Lifestyle Recommendations From Today's Visit:   Eating Plan: Concentrate on  low sodium  Exercise: pt would benefit from daily dedicated walking 30  minutes daily  Recommendations for Other Cardiovascular Risk Factors, naseem all that apply:   Diabetes/Impaired Fasting Glucose- <100    Resulting CHF medications today (changes are bolded)  Entresto or ACE/ARB:losartan 12.5mg po bid  Beta blocker: metoprolol XL 25mg po daily  Diuretic:furosemide 20mg po bid  Aldosterone antagonist: 12.5mg po daily     Bp is at goal today.     PT C/O constipation.  Pt has been using MOM 30mg po daily, will add docusate 100mg po daily.  Pt to increase his H20 intake. Pt reports BM ~4-5 hours after MOM ingestion. Pt to increase his intake of vegetables.    To reduce the risk of major adverse cardiovascular events (cardiovascular death, non-fatal MI or non-fatal stroke) and to reduce the risk of cardiovascular death and hospitalization for heart failure in adults with heart failure with reduced ejection fraction NYHA class II-IV.      Will begin Jardiance 25mg po daily          Studies Ordered at Todays Visit:  None   Blood Work Ordered At Today's visit:   None  Follow-Up:   2 weeks    Emily Márquez    CC:  Mitesh Sorto M.D.  No ref. provider found     Medications reconciled   Flow sheets updated

## 2022-05-09 DIAGNOSIS — I50.21 ACUTE SYSTOLIC HEART FAILURE (HCC): ICD-10-CM

## 2022-05-09 RX ORDER — ASPIRIN 81 MG/1
81 TABLET ORAL DAILY
Qty: 90 TABLET | Refills: 0 | Status: CANCELLED | OUTPATIENT
Start: 2022-05-09

## 2022-05-10 ENCOUNTER — NON-PROVIDER VISIT (OUTPATIENT)
Dept: CARDIOLOGY | Facility: MEDICAL CENTER | Age: 67
End: 2022-05-10
Payer: MEDICARE

## 2022-05-10 VITALS
HEIGHT: 63 IN | SYSTOLIC BLOOD PRESSURE: 108 MMHG | WEIGHT: 142 LBS | BODY MASS INDEX: 25.16 KG/M2 | DIASTOLIC BLOOD PRESSURE: 59 MMHG | HEART RATE: 85 BPM

## 2022-05-10 DIAGNOSIS — I50.21 ACUTE SYSTOLIC HEART FAILURE (HCC): ICD-10-CM

## 2022-05-10 PROCEDURE — 99211 OFF/OP EST MAY X REQ PHY/QHP: CPT | Performed by: INTERNAL MEDICINE

## 2022-05-10 RX ORDER — AMOXICILLIN 250 MG
1 CAPSULE ORAL DAILY
Qty: 100 TABLET | Refills: 1 | Status: SHIPPED | OUTPATIENT
Start: 2022-05-10 | End: 2022-10-14

## 2022-05-10 RX ORDER — ASPIRIN 81 MG/1
81 TABLET ORAL DAILY
Qty: 100 TABLET | Refills: 4 | Status: SHIPPED | OUTPATIENT
Start: 2022-05-10 | End: 2023-06-22

## 2022-05-10 RX ORDER — FUROSEMIDE 20 MG/1
20 TABLET ORAL DAILY
Qty: 100 TABLET | Refills: 1 | Status: SHIPPED | OUTPATIENT
Start: 2022-05-10 | End: 2022-05-18 | Stop reason: SDUPTHER

## 2022-05-10 ASSESSMENT — FIBROSIS 4 INDEX: FIB4 SCORE: 1.49

## 2022-05-10 NOTE — PROGRESS NOTES
CHF Pharmacotherapy visit - Visit  Date of Service: 05/10/22  Informed written consent was given on: 2022    Pac Keith is here for CHF.    Utilized translation services for this encounter.  Language Line - [phone]  [facility] ID - [id number]   Celine, ID # [id number]  HPI  Pertinent Interval History since last visit:   none  Most recent EF:  20%  Changes to laboratory values since last visit:  None to evaluate  Current CHF Medications - including dose:   Entresto or ACE/ARB:losartan 12.5mg po daily  Beta blocker: metoprolol XL 25mg po daily  Diuretic:furosemide 20mg po bid  Aldosterone antagonist: spironolactone 12.5mg po daily      Changes to weight since last visit:   Pt is down 3 lbs in the past 4 weeks   Home BP:  Pt does not check BP at home      Current Adherence to CHF Therapies:  Complete    SOCIAL HISTORY  Social History     Tobacco Use   Smoking Status Current Every Day Smoker   • Packs/day: 0.50   • Types: Cigarettes   • Last attempt to quit: 12/3/2016   • Years since quittin.4   Smokeless Tobacco Never Used   Tobacco Comment    10 CIGARETTES A DAY      Change in weight: pt is down 3 lbs since last visit  Exercise habits: no regular exercise program pt has been riding his bike 20 minutes daily  Diet: lower sodium asian diet      DATA REVIEW  Most Recent CMP Panel:   Lab Results   Component Value Date    SODIUM 138 2022    POTASSIUM 4.6 2022    CHLORIDE 105 2022    CO2 20 2022    ANION 13.0 2022    GLUCOSE 128 (H) 2022    BUN 19 2022    CREATININE 0.90 2022    CALCIUM 9.4 2022    ASTSGOT 30 2022    ALTSGPT 31 2022    ALKPHOSPHAT 84 2022    TBILIRUBIN 0.8 2022    ALBUMIN 3.9 2022    AGRATIO 1.2 2022    TSHULTRASEN 3.040 2022       Renal function:  59-60 ml/min    Other Pertinent Blood Work:     Other:      Recent Imaging Studies:    None since last visit    ASSESSMENT AND PLAN  Changes to  medications:    Lifestyle Recommendations From Today's Visit:   Eating Plan: Concentrate on  low sodium asian diet  Exercise: continue increase intensity and duration of exercise  Recommendations for Other Cardiovascular Risk Factors, naseem all that apply:   Smoking- continue to completely abstain from smoking    Resulting CHF medications today (changes are bolded)  Entresto or ACE/ARB:losartan 12.5mg po daily  Beta blocker: metoprolol XL 25mg po daily  Diuretic:furosemide 20mg po bid  Aldosterone antagonist: spironolactone 12.5mg po daily    BP is at goal at this visit.  Pt c/o     Pt c/o constipation with hard stools, difficult to pass.  Will DC DSS and start senna plus      To reduce the risk of major adverse cardiovascular events (cardiovascular death, non-fatal MI or non-fatal stroke) and to reduce the risk of cardiovascular death and hospitalization for heart failure in adults with heart failure with reduced ejection fraction NYHA class II-IV.     It is unclear if he is taking his jardiance.  (he cannot read or write English)      Studies Ordered at Todays Visit:  None   Blood Work Ordered At Today's visit:   None  Follow-Up:   4 weeks    Emily Márquez    CC:  Mitesh Sorto M.D.  No ref. provider found     Medications reconciled   Flow sheets updated

## 2022-05-11 RX ORDER — BLOOD SUGAR DIAGNOSTIC
STRIP MISCELLANEOUS
Qty: 100 STRIP | Refills: 5 | Status: SHIPPED | OUTPATIENT
Start: 2022-05-11 | End: 2022-07-12 | Stop reason: SDUPTHER

## 2022-05-18 ENCOUNTER — TELEPHONE (OUTPATIENT)
Dept: MEDICAL GROUP | Facility: PHYSICIAN GROUP | Age: 67
End: 2022-05-18
Payer: COMMERCIAL

## 2022-05-18 DIAGNOSIS — I50.21 ACUTE SYSTOLIC HEART FAILURE (HCC): ICD-10-CM

## 2022-05-18 RX ORDER — FUROSEMIDE 20 MG/1
20 TABLET ORAL 2 TIMES DAILY
Qty: 200 TABLET | Refills: 1 | Status: SHIPPED | OUTPATIENT
Start: 2022-05-18 | End: 2022-07-06 | Stop reason: SDUPTHER

## 2022-05-18 NOTE — TELEPHONE ENCOUNTER
Returned daughters call.  Unable to leave VM message, VM is full  Emily Márquez, Clinical Pharmacist, CDE, CACP

## 2022-06-02 NOTE — PROGRESS NOTES
06/13/22    Subjective Ipad  used.     Chief Complaint:  Anemia    HPI:  66 Cantonese speaking Filipino male referred by Dr. Mitesh Sorto for consultation for anemia. H/H around 10/30 since at least March of this year. MCV is borderline low. Iron saturation in April 2022 was 11%. He was referred to GI in March. He has CAD and is on aspirin and Plavix. He is also diabetic on Glucophage and Jardiance. He was recently hospitalized with exacerbation of CHF. No obvious h/o bleeding.     ROS:    Constitutional: No weight loss  Skin: No rash or jaundice  HENT: No change in eyesight or hearing  Cardiovascular:No chest pain or arrythmia  Respiratory:No cough or SOB  GI:No nausea, vomiting, diarrhea, constipation  :No dysuria or frequency  Musculoskeletal:No bone or joint pain  Neuro:No sx's of neuropathy  Psych: No complaints    PMH:      No Known Allergies    Past Medical History:   Diagnosis Date   • Diabetes ([facility])    • DM type 2 (diabetes mellitus, type 2) ([facility]) 6/23/2016   • Essential hypertension 6/23/2016   • GERD (gastroesophageal reflux disease) 6/23/2016   • Hepatitis B 6/23/2016   • Hyperlipidemia 6/23/2016   • Vitamin D deficiency 6/23/2016        History reviewed. No pertinent surgical history.     Medications:    Current Outpatient Medications on File Prior to Visit   Medication Sig Dispense Refill   • furosemide (LASIX) 20 MG Tab Take 1 Tablet by mouth 2 times a day. 200 Tablet 1   • glucose blood (ONETOUCH VERIO) strip CHECK BLOOD SUGAR ONE TIME PER DAY AND AS NEEDED FOR HIGH OR LOW SUGAR 100 Strip 5   • aspirin 81 MG EC tablet Take 1 Tablet by mouth every day. 100 Tablet 4   • senna-docusate (SENNA PLUS) 8.6-50 MG Tab Take 1 Tablet by mouth every day. 100 Tablet 1   • clopidogrel (PLAVIX) 75 MG Tab Take 1 Tablet by mouth every day. 90 Tablet 3   • metoprolol SR (TOPROL XL) 25 MG TABLET SR 24 HR Take 1 Tablet by mouth every day. 90 Tablet 3   • rosuvastatin (CRESTOR) 40 MG tablet Take 1 Tablet by  "mouth every evening. 90 Tablet 3   • Lancets Per insurance coverage. Check blood sugar 1x per day and prn ssx of high or low sugar 200 Each 6   • Blood Glucose Monitoring Suppl (ONE TOUCH ULTRA 2) w/Device Kit CHECK BLOOD SUGAR 1 TIME A DAY AND AS NEEDED 1 Kit 0   • Alcohol Swabs ([employer] PREP) 70 % Pads PER INSURANCE COVERAGE. WIPE SITE WITH PREP PAD PRIOR TO INJECTION 200 Each 5   • losartan (COZAAR) 25 MG Tab Take 0.5 Tablets by mouth every day. 30 Tablet 6   • insulin glargine (LANTUS SOLOSTAR) 100 UNIT/ML Solution Pen-injector injection Inject 15 Units under the skin every evening. 5 Each 6   • spironolactone (ALDACTONE) 25 MG Tab Take 0.5 Tablets by mouth every day. 45 Tablet 3   • metFORMIN (GLUCOPHAGE) 500 MG Tab Take 1 Tablet by mouth 2 times a day with meals. 60 Tablet 6   • vitamin D (CHOLECALCIFEROL) 1000 UNIT Tab Take 2 Tabs by mouth every day. 30 Tab 3   • magnesium hydroxide (MILK OF MAGNESIA) 400 MG/5ML Suspension Take 15 mL by mouth every day.     • Empagliflozin (JARDIANCE) 25 MG Tab Take 1 Tablet by mouth every day. 30 Tablet 1     No current facility-administered medications on file prior to visit.       Social History     Tobacco Use   • Smoking status: Current Every Day Smoker     Packs/day: 0.50     Types: Cigarettes     Last attempt to quit: 12/3/2016     Years since quittin.5   • Smokeless tobacco: Never Used   • Tobacco comment: 10 CIGARETTES A DAY   Substance Use Topics   • Alcohol use: No     Alcohol/week: 0.0 oz        Family History   Problem Relation Age of Onset   • Heart Disease Neg Hx         Objective    Vitals:    /60 (BP Location: Left arm, Patient Position: Sitting, BP Cuff Size: Adult)   Pulse 89   Temp 36.7 °C (98.1 °F) (Temporal)   Resp 16   Ht 1.6 m (5' 2.99\")   Wt 64.4 kg (142 lb 1.4 oz)   SpO2 100%   BMI 25.18 kg/m²     Physical Exam:    Appears well-developed and well-nourished. No distress.    Head -  Normocephalic .   Eyes - Pupils are equal. Conjunctivae " normal. No scleral icterus.   Ears - normal hearing  Neck - Normal range of motion. Neck supple. No thyromegaly  Cardiovascular - Normal rate, regular rhythm, normal heart sounds and intact distal pulses. No  gallop, murmur or rub  Pulmonary - Normal breath sounds.  No wheeze, rales or rhonci  Abdominal -Soft. No distension, tenderness, organomegaly or mass  Extremities-  No edema or tenderness.    Nodes - No submental, submandibular, preauricular, cervical, axillary or inguinal adenopathy.    Neurological -   Alert and oriented  Skin - Skin is warm and dry. No rash noted. Not diaphoretic. No erythema. No pallor. No jaundice   Psychiatric -  Normal mood and affect.    Labs:    Latest Reference Range & Units 04/08/22 01:00   WBC 4.8 - 10.8 K/uL 8.2   RBC 4.70 - 6.10 M/uL 4.26 (L)   Hemoglobin 14.0 - 18.0 g/dL 10.9 (L)   Hematocrit 42.0 - 52.0 % 34.4 (L)   MCV 81.4 - 97.8 fL 80.8 (L)   MCH 27.0 - 33.0 pg 25.6 (L)   MCHC 33.7 - 35.3 g/dL 31.7 (L)   RDW 35.9 - 50.0 fL 49.6   Platelet Count 164 - 446 K/uL 246   MPV 9.0 - 12.9 fL 9.7   Neutrophils-Polys 44.00 - 72.00 % 65.20   Neutrophils (Absolute) 1.82 - 7.42 K/uL 5.33 [1]   Lymphocytes 22.00 - 41.00 % 23.10   Lymphs (Absolute) 1.00 - 4.80 K/uL 1.89   Monocytes 0.00 - 13.40 % 9.60       Latest Reference Range & Units 04/03/22 01:22   Iron 50 - 180 ug/dL 30 (L)   Total Iron Binding 250 - 450 ug/dL 278   % Saturation 15 - 55 % 11 (L)      Latest Reference Range & Units 04/03/22 01:22   Ferritin 22.0 - 322.0 ng/mL 49.8       Assessment  Most likely iron deficiency anemia  Imp:    Visit Diagnosis:    1. Iron deficiency anemia, unspecified iron deficiency anemia type  FERRITIN    IRON/TOTAL IRON BIND    CBC WITH DIFFERENTIAL    ferrous sulfate 325 (65 Fe) MG tablet         Plan:    FeSO4 one a day  Recheck lab in  in a month  Follow up with getting GI consult    Everett Rahman M.D.

## 2022-06-07 ENCOUNTER — NON-PROVIDER VISIT (OUTPATIENT)
Dept: CARDIOLOGY | Facility: MEDICAL CENTER | Age: 67
End: 2022-06-07
Payer: MEDICARE

## 2022-06-07 VITALS
DIASTOLIC BLOOD PRESSURE: 64 MMHG | WEIGHT: 142.4 LBS | HEART RATE: 79 BPM | BODY MASS INDEX: 25.23 KG/M2 | SYSTOLIC BLOOD PRESSURE: 143 MMHG

## 2022-06-07 PROCEDURE — 99211 OFF/OP EST MAY X REQ PHY/QHP: CPT | Performed by: NURSE PRACTITIONER

## 2022-06-07 ASSESSMENT — FIBROSIS 4 INDEX: FIB4 SCORE: 1.49

## 2022-06-07 NOTE — PROGRESS NOTES
CHF Pharmacotherapy visit - Visit    Date of Service: 06/07/22    Utilized translation services for this encounter.  Language Line - [phone]  [facility] ID - [id number]   ?, ID # [id number]      Informed written consent was given on: 4/26/2022    Pac Keith is here for CHF    HPI  Pertinent Interval History since last visit:   • Patient reports he ran out of some of his medications    Most recent EF:  20%      Current Outpatient Medications:   •  OneTouch Verio, CHECK BLOOD SUGAR ONE TIME PER DAY AND AS NEEDED FOR HIGH OR LOW SUGAR  •  Lancets, Per insurance coverage. Check blood sugar 1x per day and prn ssx of high or low sugar  •  furosemide, 20 mg, Oral, BID  •  aspirin, 81 mg, Oral, DAILY  •  senna-docusate, 1 Tablet, Oral, DAILY  •  magnesium hydroxide, 15 mL, Oral, DAILY  •  clopidogrel, 75 mg, Oral, DAILY  •  metoprolol SR, 25 mg, Oral, DAILY  •  rosuvastatin, 40 mg, Oral, Q EVENING  •  Jardiance, 1 Tablet, Oral, DAILY  •  ONE TOUCH ULTRA 2, CHECK BLOOD SUGAR 1 TIME A DAY AND AS NEEDED  •  CVS Prep, PER INSURANCE COVERAGE. WIPE SITE WITH PREP PAD PRIOR TO INJECTION  •  losartan, 12.5 mg, Oral, DAILY  •  Lantus SoloStar, 15 Units, Subcutaneous, Q EVENING  •  spironolactone, 12.5 mg, Oral, DAILY  •  metFORMIN, 500 mg, Oral, BID WITH MEALS  •  vitamin D, 2,000 Units, Oral, DAILY    Current Adherence to CHF Therapies:  Partial- pt presents with some empty bottles of meds, but refills available at the pharmacy.     Current CHF Medications - including dose:   • Entresto or ACE/ARB: Losartan 12.5mg QD  • Beta blocker: Metoprolol XL 25mg QD   • Diuretic: Furosemide 20mg BID  • Aldosterone antagonist: Spironolactone 12.5mg QD    Home BP and HR:  Pt has not been monitoring at home    Change in weight: Stable    Exercise habits: no regular exercise program - but patient does sporadically walk and do some home exercises    Diet: common adult- focuses on low sodium diet    SOCIAL HISTORY  Social History      Tobacco Use   Smoking Status Current Every Day Smoker   • Packs/day: 0.50   • Types: Cigarettes   • Last attempt to quit: 12/3/2016   • Years since quittin.5   Smokeless Tobacco Never Used   Tobacco Comment    10 CIGARETTES A DAY        DATA REVIEW  There were no vitals filed for this visit.    Lab Results   Component Value Date/Time    HBA1C 7.0 (H) 2022 12:21 PM          Lab Results   Component Value Date/Time    CHOLSTRLTOT 91 (L) 2022 12:21 PM    LDL 48 2022 12:21 PM    HDL 34 (A) 2022 12:21 PM    TRIGLYCERIDE 46 2022 12:21 PM       Lab Results   Component Value Date/Time    SODIUM 138 2022 11:26 AM    POTASSIUM 4.6 2022 11:26 AM    CHLORIDE 105 2022 11:26 AM    CO2 20 2022 11:26 AM    GLUCOSE 128 (H) 2022 11:26 AM    BUN 19 2022 11:26 AM    CREATININE 0.90 2022 11:26 AM    CREATININE 0.9 2005 04:08 PM     Lab Results   Component Value Date/Time    ALKPHOSPHAT 84 2022 11:31 AM    ASTSGOT 30 2022 11:31 AM    ALTSGPT 31 2022 11:31 AM    TBILIRUBIN 0.8 2022 11:31 AM    INR 1.11 2017 09:31 AM    ALBUMIN 3.9 2022 11:31 AM      No components found for: MICROALBUMINCREATRATIOURINE    Renal function:  Calculated creatinine clearance: ~71 ml/min      Latest Reference Range & Units 22 11:26   Creatinine 0.50 - 1.40 mg/dL 0.90   GFR (CKD-EPI) >60 mL/min/1.73 m 2 93 [1]     Other Pertinent Blood Work:     Other:  Immunization History   Administered Date(s) Administered   • Hepatitis B Vaccine (Adol/Adult) 2020   • Influenza (IM) Preservative Free - HISTORICAL DATA 2015   • Influenza Seasonal Injectable - Historical Data 10/08/2013, 2015   • Influenza Vaccine Adult HD 2020   • Influenza Vaccine Quad Inj (Pf) 10/12/2018, 10/04/2019   • Influenza Vaccine Quad Inj (Preserved) 10/27/2016   • PFIZER PURPLE CAP SARS-COV-2 VACCINATION (12+) 2021, 2021   • Pneumococcal  polysaccharide vaccine (PPSV-23) 08/21/2020, 04/14/2022   • Tdap Vaccine 08/21/2020   • Zoster Vaccine Recombinant (RZV) (SHINGRIX) 08/21/2020, 01/26/2021     Up to date on pneumococcal vaccine? yes    Recent Imaging Studies:    None since last visit      ASSESSMENT AND PLAN  • Patient presents to clinic today for CHF follow up.   • Patient reports doing well with no complaints aside from needing to refill some of his medications. Patient instructed to call the pharmacy for refills  • Patient did report some SOB during household chores  • Patient's BP was elevated in office today but patient has been out of his Losartan and Furosemide for about a week. Patient will obtain his refills from pharmacy and restart as directed.     Resulting CHF medications today (changes are bolded)  • Entresto or ACE/ARB: Losartan 12.5mg QD  • Beta blocker: Metoprolol XL 25mg QD   • Diuretic: Furosemide 20mg BID  • Aldosterone antagonist: Spironolactone 12.5mg QD    Lifestyle Recommendations From Today's Visit:   • Continue with low sodium diet  • Exercise as tolerated    Significant changes to laboratory values since last visit that require repeat labs:  • none    Blood Work Ordered At Today's visit:   None    Studies Ordered at Todays Visit:  None     Follow-Up:   4 weeks    Tao ThomasD      CC:  Mitesh Sorto M.D.  No ref. provider found    Medications reconciled  Flow sheets updated

## 2022-06-13 ENCOUNTER — OFFICE VISIT (OUTPATIENT)
Dept: HEMATOLOGY ONCOLOGY | Facility: MEDICAL CENTER | Age: 67
End: 2022-06-13
Payer: MEDICARE

## 2022-06-13 VITALS
HEIGHT: 63 IN | HEART RATE: 89 BPM | TEMPERATURE: 98.1 F | RESPIRATION RATE: 16 BRPM | DIASTOLIC BLOOD PRESSURE: 60 MMHG | SYSTOLIC BLOOD PRESSURE: 122 MMHG | WEIGHT: 142.09 LBS | BODY MASS INDEX: 25.18 KG/M2 | OXYGEN SATURATION: 100 %

## 2022-06-13 DIAGNOSIS — D50.9 IRON DEFICIENCY ANEMIA, UNSPECIFIED IRON DEFICIENCY ANEMIA TYPE: ICD-10-CM

## 2022-06-13 PROCEDURE — 99204 OFFICE O/P NEW MOD 45 MIN: CPT | Performed by: INTERNAL MEDICINE

## 2022-06-13 RX ORDER — FERROUS SULFATE 325(65) MG
325 TABLET ORAL DAILY
Qty: 90 TABLET | Refills: 3 | Status: SHIPPED | OUTPATIENT
Start: 2022-06-13 | End: 2023-08-07

## 2022-06-13 ASSESSMENT — FIBROSIS 4 INDEX: FIB4 SCORE: 1.49

## 2022-06-13 ASSESSMENT — PAIN SCALES - GENERAL: PAINLEVEL: NO PAIN

## 2022-07-06 ENCOUNTER — OFFICE VISIT (OUTPATIENT)
Dept: CARDIOLOGY | Facility: MEDICAL CENTER | Age: 67
End: 2022-07-06
Payer: MEDICARE

## 2022-07-06 ENCOUNTER — TELEPHONE (OUTPATIENT)
Dept: HEMATOLOGY ONCOLOGY | Facility: MEDICAL CENTER | Age: 67
End: 2022-07-06
Payer: COMMERCIAL

## 2022-07-06 VITALS
RESPIRATION RATE: 16 BRPM | HEART RATE: 77 BPM | BODY MASS INDEX: 25.95 KG/M2 | OXYGEN SATURATION: 97 % | WEIGHT: 141 LBS | HEIGHT: 62 IN | DIASTOLIC BLOOD PRESSURE: 48 MMHG | SYSTOLIC BLOOD PRESSURE: 86 MMHG

## 2022-07-06 DIAGNOSIS — I50.20 SYSTOLIC HEART FAILURE, ACC/AHA STAGE C (HCC): ICD-10-CM

## 2022-07-06 DIAGNOSIS — I10 ESSENTIAL HYPERTENSION: Chronic | ICD-10-CM

## 2022-07-06 DIAGNOSIS — I50.21 ACUTE SYSTOLIC HEART FAILURE (HCC): ICD-10-CM

## 2022-07-06 DIAGNOSIS — I25.10 CORONARY ARTERY DISEASE INVOLVING NATIVE HEART WITHOUT ANGINA PECTORIS, UNSPECIFIED VESSEL OR LESION TYPE: ICD-10-CM

## 2022-07-06 DIAGNOSIS — E78.5 DYSLIPIDEMIA: Chronic | ICD-10-CM

## 2022-07-06 DIAGNOSIS — E11.42 DIABETIC POLYNEUROPATHY ASSOCIATED WITH TYPE 2 DIABETES MELLITUS (HCC): ICD-10-CM

## 2022-07-06 DIAGNOSIS — I25.5 ISCHEMIC CARDIOMYOPATHY: ICD-10-CM

## 2022-07-06 PROCEDURE — 99214 OFFICE O/P EST MOD 30 MIN: CPT | Performed by: NURSE PRACTITIONER

## 2022-07-06 RX ORDER — FUROSEMIDE 20 MG/1
20 TABLET ORAL DAILY
Qty: 60 TABLET | Refills: 3 | Status: SHIPPED | OUTPATIENT
Start: 2022-07-06 | End: 2022-10-14

## 2022-07-06 RX ORDER — SPIRONOLACTONE 25 MG/1
25 TABLET ORAL DAILY
Qty: 90 TABLET | Refills: 3 | Status: SHIPPED | OUTPATIENT
Start: 2022-07-06 | End: 2023-08-25

## 2022-07-06 ASSESSMENT — ENCOUNTER SYMPTOMS
BRUISES/BLEEDS EASILY: 0
WEIGHT LOSS: 0
FEVER: 0
TINGLING: 0
ORTHOPNEA: 0
COUGH: 0
VOMITING: 0
FALLS: 0
MYALGIAS: 0
PALPITATIONS: 0
BLOOD IN STOOL: 0
PND: 0
LOSS OF CONSCIOUSNESS: 0
ABDOMINAL PAIN: 0
SHORTNESS OF BREATH: 0
DIZZINESS: 0
CLAUDICATION: 0
BLURRED VISION: 0
NAUSEA: 0

## 2022-07-06 ASSESSMENT — FIBROSIS 4 INDEX: FIB4 SCORE: 1.49

## 2022-07-06 NOTE — PATIENT INSTRUCTIONS
Increase spironolactone to 25 mg daily    Decrease lasix to 20 mg ONCE a day. With additional 20 mg dose as needed for weight gain greater than 3 pounds in 1 day or 5 pounds in 1 week.    Blood work next week    Talk to Dr. Rahman about IV iron

## 2022-07-06 NOTE — PROGRESS NOTES
Chief Complaint   Patient presents with   • Cardiomyopathy (Ischemic)   • Hypertension   • Congestive Heart Failure   Patient declined  services today.  His daughter was present and translated per patient's preference.    Subjective     Pac Keith is a 66 y.o. male who presents today with his daughter for heart failure follow-up patient with EF of 20% on echocardiogram.  Patient was admitted on 4/2/2020 for new onset HF.  Patient has past medical history of T2DM, hypertension, dyslipidemia, ALONSO.  Patient's etiology of HF was determined to be ischemic.  Patient is s/p PCI to midLAD and RCA.  Since patient was last seen he has been followed by pharmacotherapy clinic for HF OMT optimization as well as Dr. Rahman for ALONSO.    Today, patient feels well, denies chest pain, shortness of breath, palpitations, dizziness/lightheadedness, orthopnea, PND or Edema.  Patient and daughter endorse medication compliance.  Patient reports Jardiance was too expensive and he has not been taking or filling this prescription.  Patient has been weighing himself at home at 131 pounds.  Weight in the office today is 141 pounds.  Patient appears euvolemic on exam. No JVD, lungs are clear to auscultation, no pitting edema in bilateral lower extremities, no ascites.    HF OMT optimize per below.  Consider IV iron per AFFIRM HF trial for iron deficiency anemia.  We will also obtain follow-up echocardiogram.  Questions answered.  Patient verbalizes understanding.  Patient to follow-up in 3 months after echo, sooner if needed.    4/13/2022: Initial 6 minute walk test, patient was able to complete 301 m during initial 6 minute walk test. Patient's O2 saturation at baseline was 98% and at the end of the test, the O2 saturation was 96%. Patient reported level 1 of dyspnea on Corinne scale.  Patient was able to walk for 6 minutes.  MLWHF score 29      Past Medical History:   Diagnosis Date   • Diabetes (HCC)    • DM type 2 (diabetes mellitus, type  2) (HCC) 2016   • Essential hypertension 2016   • GERD (gastroesophageal reflux disease) 2016   • Hepatitis B 2016   • Hyperlipidemia 2016   • Vitamin D deficiency 2016     History reviewed. No pertinent surgical history.  Family History   Problem Relation Age of Onset   • Heart Disease Neg Hx      Social History     Socioeconomic History   • Marital status:      Spouse name: Not on file   • Number of children: Not on file   • Years of education: Not on file   • Highest education level: Not on file   Occupational History   • Not on file   Tobacco Use   • Smoking status: Current Every Day Smoker     Packs/day: 0.50     Types: Cigarettes     Last attempt to quit: 12/3/2016     Years since quittin.5   • Smokeless tobacco: Never Used   • Tobacco comment: 10 CIGARETTES A DAY   Vaping Use   • Vaping Use: Never used   Substance and Sexual Activity   • Alcohol use: No     Alcohol/week: 0.0 oz   • Drug use: No   • Sexual activity: Not on file   Other Topics Concern   • Not on file   Social History Narrative   • Not on file     Social Determinants of Health     Financial Resource Strain: Not on file   Food Insecurity: Not on file   Transportation Needs: Not on file   Physical Activity: Not on file   Stress: Not on file   Social Connections: Not on file   Intimate Partner Violence: Not on file   Housing Stability: Not on file     No Known Allergies  Outpatient Encounter Medications as of 2022   Medication Sig Dispense Refill   • spironolactone (ALDACTONE) 25 MG Tab Take 1 Tablet by mouth every day. 90 Tablet 3   • furosemide (LASIX) 20 MG Tab Take 1 Tablet by mouth every day. With additional tablet in afternoon for weight gain greater than 3 lbs in 1 day or 5 lbs in 1 week 60 Tablet 3   • ferrous sulfate 325 (65 Fe) MG tablet Take 1 Tablet by mouth every day. 90 Tablet 3   • glucose blood (ONETOUCH VERIO) strip CHECK BLOOD SUGAR ONE TIME PER DAY AND AS NEEDED FOR HIGH OR LOW SUGAR  100 Strip 5   • aspirin 81 MG EC tablet Take 1 Tablet by mouth every day. 100 Tablet 4   • senna-docusate (SENNA PLUS) 8.6-50 MG Tab Take 1 Tablet by mouth every day. 100 Tablet 1   • magnesium hydroxide (MILK OF MAGNESIA) 400 MG/5ML Suspension Take 15 mL by mouth every day.     • clopidogrel (PLAVIX) 75 MG Tab Take 1 Tablet by mouth every day. 90 Tablet 3   • metoprolol SR (TOPROL XL) 25 MG TABLET SR 24 HR Take 1 Tablet by mouth every day. 90 Tablet 3   • rosuvastatin (CRESTOR) 40 MG tablet Take 1 Tablet by mouth every evening. 90 Tablet 3   • Lancets Per insurance coverage. Check blood sugar 1x per day and prn ssx of high or low sugar 200 Each 6   • Blood Glucose Monitoring Suppl (ONE TOUCH ULTRA 2) w/Device Kit CHECK BLOOD SUGAR 1 TIME A DAY AND AS NEEDED 1 Kit 0   • Alcohol Swabs ([employer] PREP) 70 % Pads PER INSURANCE COVERAGE. WIPE SITE WITH PREP PAD PRIOR TO INJECTION 200 Each 5   • losartan (COZAAR) 25 MG Tab Take 0.5 Tablets by mouth every day. 30 Tablet 6   • insulin glargine (LANTUS SOLOSTAR) 100 UNIT/ML Solution Pen-injector injection Inject 15 Units under the skin every evening. 5 Each 6   • metFORMIN (GLUCOPHAGE) 500 MG Tab Take 1 Tablet by mouth 2 times a day with meals. 60 Tablet 6   • vitamin D (CHOLECALCIFEROL) 1000 UNIT Tab Take 2 Tabs by mouth every day. 30 Tab 3   • [DISCONTINUED] furosemide (LASIX) 20 MG Tab Take 1 Tablet by mouth 2 times a day. 200 Tablet 1   • [DISCONTINUED] Empagliflozin (JARDIANCE) 25 MG Tab Take 1 Tablet by mouth every day. 30 Tablet 1   • [DISCONTINUED] spironolactone (ALDACTONE) 25 MG Tab Take 0.5 Tablets by mouth every day. 45 Tablet 3     No facility-administered encounter medications on file as of 7/6/2022.     Review of Systems   Constitutional: Negative for fever, malaise/fatigue and weight loss.   Eyes: Negative for blurred vision.   Respiratory: Negative for cough and shortness of breath.    Cardiovascular: Negative for chest pain, palpitations, orthopnea,  "claudication, leg swelling and PND.   Gastrointestinal: Negative for abdominal pain, blood in stool, nausea and vomiting.   Genitourinary: Negative for dysuria, frequency and hematuria.   Musculoskeletal: Negative for falls and myalgias.   Neurological: Negative for dizziness, tingling and loss of consciousness.   Endo/Heme/Allergies: Does not bruise/bleed easily.              Objective     BP (!) 86/48 (BP Location: Left arm, Patient Position: Sitting, BP Cuff Size: Adult)   Pulse 77   Resp 16   Ht 1.575 m (5' 2\")   Wt 64 kg (141 lb)   SpO2 97%   BMI 25.79 kg/m²     Physical Exam  Vitals reviewed.   Constitutional:       Appearance: He is well-developed.   HENT:      Head: Normocephalic and atraumatic.   Eyes:      Pupils: Pupils are equal, round, and reactive to light.   Neck:      Vascular: No JVD.   Cardiovascular:      Rate and Rhythm: Normal rate and regular rhythm.      Heart sounds: Normal heart sounds. No murmur heard.    No friction rub. No gallop.   Pulmonary:      Effort: Pulmonary effort is normal. No respiratory distress.      Breath sounds: Normal breath sounds.   Abdominal:      General: Bowel sounds are normal. There is no distension.      Palpations: Abdomen is soft.   Skin:     General: Skin is warm and dry.      Findings: No erythema.   Neurological:      Mental Status: He is alert and oriented to person, place, and time.   Psychiatric:         Behavior: Behavior normal.            Lab Results   Component Value Date/Time    CHOLSTRLTOT 91 (L) 03/28/2022 12:21 PM    LDL 48 03/28/2022 12:21 PM    HDL 34 (A) 03/28/2022 12:21 PM    TRIGLYCERIDE 46 03/28/2022 12:21 PM       Lab Results   Component Value Date/Time    SODIUM 138 04/11/2022 11:26 AM    POTASSIUM 4.6 04/11/2022 11:26 AM    CHLORIDE 105 04/11/2022 11:26 AM    CO2 20 04/11/2022 11:26 AM    GLUCOSE 128 (H) 04/11/2022 11:26 AM    BUN 19 04/11/2022 11:26 AM    CREATININE 0.90 04/11/2022 11:26 AM    CREATININE 0.9 12/29/2005 04:08 PM "     Lab Results   Component Value Date/Time    ALKPHOSPHAT 84 04/02/2022 11:31 AM    ASTSGOT 30 04/02/2022 11:31 AM    ALTSGPT 31 04/02/2022 11:31 AM    TBILIRUBIN 0.8 04/02/2022 11:31 AM      Echocardiogram: 4/3/2022  Severely reduced left ventricular systolic function.  Global hypokinesis, relatively preserved wall motion laterally.  Grade III diastolic dysfunction.  Dilated IVC without inspiratory collapse.  Reduced right ventricular systolic function.  Moderate tricuspid regurgitation.  Estimated right ventricular systolic pressure is 62  mmHg.  Incidental note of right atrial mass - most consistent with a prominent   eustachian valve (normal anatomic variation); correlate clinically.    [facility] (4/4/2022): Findings:  1.  Left main coronary artery:  Normal.  2.  Left anterior descending artery: Mid LAD has 80% calcific stenosis after first diagonal takeoff.  First diagonal has luminal irregularities.  Second diagonal is smaller branch, has 70% stenosis in proximal portion  3.  Left circumflex coronary artery: Proximal, midportion, large first marginal free of significant disease.  Distal AV groove branch has moderate stenosis.  4.  Right coronary artery: 60-70% stenosis in proximal portion.  Diffuse 60% stenosis in midportion.  Right posterolateral branch is diffusely diseased with a focal 70% stenosis in the midportion .  This is a right dominant system.  5.  Left ventricular end diastolic pressure:  19 mmHg.  No signficant gradient across the aortic valve.    [facility] (4/7/2022):   Findings   Hemodynamics:   Aorta: 86/42 mmHg  LVEDP: 10 mmHg  No significant pullback gradient across the aortic valve  Results of intervention to the LAD:  Pre: 80% stenosis and JANETTE III flow  Post: 0% residual stenosis and JANETTE III flow. No dissection or distal embolization.  Results of intervention to the diagonal 2:  Pre: 90% stenosis and JANETTE III flow  Post: 80% residual stenosis and JANETTE III flow. No dissection or distal  embolization.  Results of intervention to the RCA:  Pre: 90% stenosis and JANETTE III flow  Post: 0% residual stenosis and JANETTE III flow. No dissection or distal embolization.  IMPRESSIONS:  1. Successful staged PCI of the RCA, LAD using MARTA, IVUS guidance  2. Successful preservation of the D2 with POBA  3. Normal LVEDP  Recommendations:  Usual post cath care  Dual antiplatelet therapy for 12 months    Assessment & Plan     1. Dyslipidemia  Basic Metabolic Panel    EC-ECHOCARDIOGRAM COMPLETE W/O CONT   2. Essential hypertension  Basic Metabolic Panel    EC-ECHOCARDIOGRAM COMPLETE W/O CONT   3. Diabetic polyneuropathy associated with type 2 diabetes mellitus (HCC)  Basic Metabolic Panel    EC-ECHOCARDIOGRAM COMPLETE W/O CONT   4. Coronary artery disease involving native heart without angina pectoris, unspecified vessel or lesion type  Basic Metabolic Panel    EC-ECHOCARDIOGRAM COMPLETE W/O CONT   5. Ischemic cardiomyopathy  Basic Metabolic Panel    EC-ECHOCARDIOGRAM COMPLETE W/O CONT   6. Acute systolic heart failure (HCC)  spironolactone (ALDACTONE) 25 MG Tab    Basic Metabolic Panel    furosemide (LASIX) 20 MG Tab    EC-ECHOCARDIOGRAM COMPLETE W/O CONT       Medical Decision Making: Today's Assessment/Status/Plan:        HFrEF, Stage C, Class II, LVEF 20%:   -Heart failure due to ischemic cardiomyopathy  -ACE-I/ARB/ARNI: Continue losartan 12.5 mg daily  -Evidence Based Beta-blocker: Continue metoprolol XL 25 mg daily  -Aldosterone Antagonist: Increase spironolactone 25 mg daily  -SGLT2-I: Patient reports Jardiance is expensive not currently taking.  -Diuretic: Decrease Lasix 20 mg daily with additional 20 mg dose as needed for weight gain  -Labs: BMP in 1 month  -Repeat Echo ordered, if LVEF not >35%, then will discuss/consider ICD for primary Prevention.   -Reinforced s/sx of worsening heart failure with patient and weight monitoring. Pt verbalizes understanding. Pt to call office if present.    -Heart Failure  Education: pt to be contacted by HF nurse for further education,   -Advanced care planning: Advanced directive packet previously provided. Patient to discussed with his daughter and wife.  -Scale provided at  new appointment for daily weight monitoring.    CAD s/p PCI to mid LAD and RCA; dyslipidemia  -Continue DAPT for 12 months  -Atorvastatin 40 mg every evening.  LDL 48.  At goal    Hypertension  -Today in office blood pressure is well controlled.   -Encouraged to obtain and continue home BP monitoring/log.  -Medication recommendations per above.    Iron deficiency anemia  -Consider IV iron per AFFIRM trial  -Followed by Heme/ONC    FU in clinic in 3 months. Sooner if needed.    Patient verbalizes understanding and agrees with the plan of care.     I personally spent a total of 31 minutes which includes face-to-face time and non-face-to-face time spent on preparing to see the patient, reviewing prior notes and tests, obtaining history from the patient, performing a medically appropriate exam, counseling and educating the patient, ordering medications/tests/procedures/referrals as clinically indicated, and documenting information in the electronic medical record.    AMANDA Benson.   [facility]  [phone]    PLEASE NOTE: This Note was created using voice recognition Software. I have made every reasonable attempt to correct obvious errors, but I expect that there are errors of grammar and possibly content that I did not discover before finalizing the note

## 2022-07-06 NOTE — TELEPHONE ENCOUNTER
----- Message from Everett Rahman M.D. sent at 7/6/2022 12:42 PM PDT -----  Has appt 7/11. Make sure he gets the blood tests done before then! Like tomorrow.

## 2022-07-06 NOTE — PROGRESS NOTES
07/11/22    Subjective    Seen with Ipad Cantonese     Chief Complaint:  Follow up from consultation for anemia    HPI:  66 Cantonese speaking male seen in consultation for anemia on 6/13/22. He was low on iron and started on FeSO4 daily and counseled to follow up with a GI consultation. Repeat lab shiow Hgb up from 10.9 to 12.6. The ferritin was high but was drawn right after he took an iron pill. Has not heard from GI for consultation.    ROS:    Constitutional: No weight loss  Skin: No rash or jaundice  HENT: No change in eyesight or hearing  Cardiovascular:No chest pain or arrythmia  Respiratory:No cough or SOB  GI:No nausea, vomiting, diarrhea, constipation  :No dysuria or frequency  Musculoskeletal:No bone or joint pain  Neuro:No sx's of neuropathy  Psych: No complaints    PMH:      No Known Allergies    Past Medical History:   Diagnosis Date   • Diabetes (HCC)    • DM type 2 (diabetes mellitus, type 2) ([facility]) 6/23/2016   • Essential hypertension 6/23/2016   • GERD (gastroesophageal reflux disease) 6/23/2016   • Hepatitis B 6/23/2016   • Hyperlipidemia 6/23/2016   • Vitamin D deficiency 6/23/2016        History reviewed. No pertinent surgical history.     Medications:    Current Outpatient Medications on File Prior to Visit   Medication Sig Dispense Refill   • spironolactone (ALDACTONE) 25 MG Tab Take 1 Tablet by mouth every day. 90 Tablet 3   • furosemide (LASIX) 20 MG Tab Take 1 Tablet by mouth every day. With additional tablet in afternoon for weight gain greater than 3 lbs in 1 day or 5 lbs in 1 week 60 Tablet 3   • ferrous sulfate 325 (65 Fe) MG tablet Take 1 Tablet by mouth every day. 90 Tablet 3   • glucose blood (ONETOUCH VERIO) strip CHECK BLOOD SUGAR ONE TIME PER DAY AND AS NEEDED FOR HIGH OR LOW SUGAR 100 Strip 5   • aspirin 81 MG EC tablet Take 1 Tablet by mouth every day. 100 Tablet 4   • senna-docusate (SENNA PLUS) 8.6-50 MG Tab Take 1 Tablet by mouth every day. 100 Tablet 1   •  "magnesium hydroxide (MILK OF MAGNESIA) 400 MG/5ML Suspension Take 15 mL by mouth every day.     • clopidogrel (PLAVIX) 75 MG Tab Take 1 Tablet by mouth every day. 90 Tablet 3   • metoprolol SR (TOPROL XL) 25 MG TABLET SR 24 HR Take 1 Tablet by mouth every day. 90 Tablet 3   • rosuvastatin (CRESTOR) 40 MG tablet Take 1 Tablet by mouth every evening. 90 Tablet 3   • Lancets Per insurance coverage. Check blood sugar 1x per day and prn ssx of high or low sugar 200 Each 6   • Blood Glucose Monitoring Suppl (ONE TOUCH ULTRA 2) w/Device Kit CHECK BLOOD SUGAR 1 TIME A DAY AND AS NEEDED 1 Kit 0   • Alcohol Swabs ([employer] PREP) 70 % Pads PER INSURANCE COVERAGE. WIPE SITE WITH PREP PAD PRIOR TO INJECTION 200 Each 5   • losartan (COZAAR) 25 MG Tab Take 0.5 Tablets by mouth every day. 30 Tablet 6   • insulin glargine (LANTUS SOLOSTAR) 100 UNIT/ML Solution Pen-injector injection Inject 15 Units under the skin every evening. 5 Each 6   • metFORMIN (GLUCOPHAGE) 500 MG Tab Take 1 Tablet by mouth 2 times a day with meals. 60 Tablet 6   • vitamin D (CHOLECALCIFEROL) 1000 UNIT Tab Take 2 Tabs by mouth every day. 30 Tab 3     No current facility-administered medications on file prior to visit.       Social History     Tobacco Use   • Smoking status: Current Every Day Smoker     Packs/day: 0.50     Types: Cigarettes     Last attempt to quit: 12/3/2016     Years since quittin.6   • Smokeless tobacco: Never Used   • Tobacco comment: 10 CIGARETTES A DAY   Substance Use Topics   • Alcohol use: No     Alcohol/week: 0.0 oz        Family History   Problem Relation Age of Onset   • Heart Disease Neg Hx         Objective    Vitals:    /70 (BP Location: Left arm, Patient Position: Sitting, BP Cuff Size: Adult)   Pulse 84   Temp 36.9 °C (98.5 °F) (Temporal)   Resp 16   Ht 1.575 m (5' 2.01\")   Wt 63.3 kg (139 lb 7.1 oz)   SpO2 98%   BMI 25.50 kg/m²     Physical Exam:    Appears well-developed and well-nourished. No distress.    Head -  " Normocephalic .   Eyes - Pupils are equal.. Conjunctivae normal. No scleral icterus.   Ears - normal hearing.    Neurological -   Alert and oriented   Skin - Skin is warm and dry. No rash noted. Not diaphoretic. No erythema. No pallor. No jaundice   Psychiatric -  Normal mood and affect.    Labs:     Latest Reference Range & Units 04/08/22 01:00 07/07/22 07:04   WBC 4.8 - 10.8 K/uL 8.2 8.9   RBC 4.70 - 6.10 M/uL 4.26 (L) 4.30 (L)   Hemoglobin 14.0 - 18.0 g/dL 10.9 (L) 12.6 (L)   Hematocrit 42.0 - 52.0 % 34.4 (L) 38.4 (L)   MCV 81.4 - 97.8 fL 80.8 (L) 89.3   MCH 27.0 - 33.0 pg 25.6 (L) 29.3   MCHC 33.7 - 35.3 g/dL 31.7 (L) 32.8 (L)   RDW 35.9 - 50.0 fL 49.6 60.9 (H)   Platelet Count 164 - 446 K/uL 246 214      Latest Reference Range & Units 07/07/22 07:04   Iron 50 - 180 ug/dL 128   Total Iron Binding 250 - 450 ug/dL 222 (L)   % Saturation 15 - 55 % 58 (H)      Latest Reference Range & Units 04/03/22 01:22 07/07/22 07:04   Ferritin 22.0 - 322.0 ng/mL 49.8 1050.0 (H)     Assessment  Responding to oral iron supplements  Imp:    Visit Diagnosis:    1. Iron deficiency anemia, unspecified iron deficiency anemia type  FERRITIN    IRON/TOTAL IRON BIND    CBC WITH DIFFERENTIAL    Referral to Gastroenterology     Plan:  Recheck lab in 3 months  Needs GI w/u to r/o blood loss - referral placed      Everett Rahman M.D.

## 2022-07-07 ENCOUNTER — HOSPITAL ENCOUNTER (OUTPATIENT)
Dept: LAB | Facility: MEDICAL CENTER | Age: 67
End: 2022-07-07
Attending: INTERNAL MEDICINE
Payer: MEDICARE

## 2022-07-07 ENCOUNTER — HOSPITAL ENCOUNTER (OUTPATIENT)
Dept: LAB | Facility: MEDICAL CENTER | Age: 67
End: 2022-07-07
Attending: NURSE PRACTITIONER
Payer: MEDICARE

## 2022-07-07 DIAGNOSIS — D50.9 IRON DEFICIENCY ANEMIA, UNSPECIFIED IRON DEFICIENCY ANEMIA TYPE: ICD-10-CM

## 2022-07-07 DIAGNOSIS — I50.21 ACUTE SYSTOLIC HEART FAILURE (HCC): ICD-10-CM

## 2022-07-07 DIAGNOSIS — I10 ESSENTIAL HYPERTENSION: Chronic | ICD-10-CM

## 2022-07-07 DIAGNOSIS — D64.9 ANEMIA, UNSPECIFIED TYPE: ICD-10-CM

## 2022-07-07 DIAGNOSIS — E11.42 DIABETIC POLYNEUROPATHY ASSOCIATED WITH TYPE 2 DIABETES MELLITUS (HCC): ICD-10-CM

## 2022-07-07 DIAGNOSIS — I25.5 ISCHEMIC CARDIOMYOPATHY: ICD-10-CM

## 2022-07-07 DIAGNOSIS — I25.10 CORONARY ARTERY DISEASE INVOLVING NATIVE HEART WITHOUT ANGINA PECTORIS, UNSPECIFIED VESSEL OR LESION TYPE: ICD-10-CM

## 2022-07-07 DIAGNOSIS — E78.5 DYSLIPIDEMIA: Chronic | ICD-10-CM

## 2022-07-07 LAB
ANION GAP SERPL CALC-SCNC: 10 MMOL/L (ref 7–16)
BASOPHILS # BLD AUTO: 0.7 % (ref 0–1.8)
BASOPHILS # BLD: 0.06 K/UL (ref 0–0.12)
BUN SERPL-MCNC: 21 MG/DL (ref 8–22)
CALCIUM SERPL-MCNC: 10 MG/DL (ref 8.5–10.5)
CHLORIDE SERPL-SCNC: 98 MMOL/L (ref 96–112)
CO2 SERPL-SCNC: 26 MMOL/L (ref 20–33)
CREAT SERPL-MCNC: 1.25 MG/DL (ref 0.5–1.4)
EOSINOPHIL # BLD AUTO: 0.47 K/UL (ref 0–0.51)
EOSINOPHIL NFR BLD: 5.3 % (ref 0–6.9)
ERYTHROCYTE [DISTWIDTH] IN BLOOD BY AUTOMATED COUNT: 60.9 FL (ref 35.9–50)
FERRITIN SERPL-MCNC: 1050 NG/ML (ref 22–322)
FERRITIN SERPL-MCNC: 1072 NG/ML (ref 22–322)
FOLATE SERPL-MCNC: 5.4 NG/ML
GFR SERPLBLD CREATININE-BSD FMLA CKD-EPI: 63 ML/MIN/1.73 M 2
GLUCOSE SERPL-MCNC: 389 MG/DL (ref 65–99)
HCT VFR BLD AUTO: 38.4 % (ref 42–52)
HGB BLD-MCNC: 12.6 G/DL (ref 14–18)
IMM GRANULOCYTES # BLD AUTO: 0.05 K/UL (ref 0–0.11)
IMM GRANULOCYTES NFR BLD AUTO: 0.6 % (ref 0–0.9)
IRON SATN MFR SERPL: 58 % (ref 15–55)
IRON SERPL-MCNC: 128 UG/DL (ref 50–180)
IRON SERPL-MCNC: 129 UG/DL (ref 50–180)
LYMPHOCYTES # BLD AUTO: 3.51 K/UL (ref 1–4.8)
LYMPHOCYTES NFR BLD: 39.5 % (ref 22–41)
MCH RBC QN AUTO: 29.3 PG (ref 27–33)
MCHC RBC AUTO-ENTMCNC: 32.8 G/DL (ref 33.7–35.3)
MCV RBC AUTO: 89.3 FL (ref 81.4–97.8)
MONOCYTES # BLD AUTO: 0.7 K/UL (ref 0–0.85)
MONOCYTES NFR BLD AUTO: 7.9 % (ref 0–13.4)
NEUTROPHILS # BLD AUTO: 4.09 K/UL (ref 1.82–7.42)
NEUTROPHILS NFR BLD: 46 % (ref 44–72)
NRBC # BLD AUTO: 0 K/UL
NRBC BLD-RTO: 0 /100 WBC
PLATELET # BLD AUTO: 214 K/UL (ref 164–446)
PMV BLD AUTO: 9.3 FL (ref 9–12.9)
POTASSIUM SERPL-SCNC: 4.4 MMOL/L (ref 3.6–5.5)
RBC # BLD AUTO: 4.3 M/UL (ref 4.7–6.1)
SODIUM SERPL-SCNC: 134 MMOL/L (ref 135–145)
TIBC SERPL-MCNC: 222 UG/DL (ref 250–450)
UIBC SERPL-MCNC: 94 UG/DL (ref 110–370)
VIT B12 SERPL-MCNC: 282 PG/ML (ref 211–911)
WBC # BLD AUTO: 8.9 K/UL (ref 4.8–10.8)

## 2022-07-07 PROCEDURE — 83540 ASSAY OF IRON: CPT

## 2022-07-07 PROCEDURE — 36415 COLL VENOUS BLD VENIPUNCTURE: CPT

## 2022-07-07 PROCEDURE — 82746 ASSAY OF FOLIC ACID SERUM: CPT

## 2022-07-07 PROCEDURE — 80048 BASIC METABOLIC PNL TOTAL CA: CPT

## 2022-07-07 PROCEDURE — 83540 ASSAY OF IRON: CPT | Mod: 91

## 2022-07-07 PROCEDURE — 82728 ASSAY OF FERRITIN: CPT

## 2022-07-07 PROCEDURE — 83550 IRON BINDING TEST: CPT

## 2022-07-07 PROCEDURE — 82607 VITAMIN B-12: CPT

## 2022-07-07 PROCEDURE — 85025 COMPLETE CBC W/AUTO DIFF WBC: CPT

## 2022-07-07 PROCEDURE — 82728 ASSAY OF FERRITIN: CPT | Mod: 91

## 2022-07-08 ENCOUNTER — TELEPHONE (OUTPATIENT)
Dept: CARDIOLOGY | Facility: MEDICAL CENTER | Age: 67
End: 2022-07-08
Payer: COMMERCIAL

## 2022-07-08 DIAGNOSIS — I10 ESSENTIAL HYPERTENSION: ICD-10-CM

## 2022-07-08 DIAGNOSIS — Z79.899 HIGH RISK MEDICATION USE: ICD-10-CM

## 2022-07-08 NOTE — LETTER
July 8, 2022         [facility]  [address] [address]        Dear Pac:    This letter is to inform you to repeat your Basic Metabolic Profile test in approximately 1 month . Please call our office to schedule an appointment. Your most recent results did show slightly low Sodium levels and we want to check for stability/resolution.       Sincerely,        Valdo Moser R.N. on behalf of Maddy GARRISON    Electronically Signed

## 2022-07-11 ENCOUNTER — OFFICE VISIT (OUTPATIENT)
Dept: HEMATOLOGY ONCOLOGY | Facility: MEDICAL CENTER | Age: 67
End: 2022-07-11
Attending: INTERNAL MEDICINE
Payer: MEDICARE

## 2022-07-11 VITALS
OXYGEN SATURATION: 98 % | RESPIRATION RATE: 16 BRPM | SYSTOLIC BLOOD PRESSURE: 110 MMHG | HEIGHT: 62 IN | BODY MASS INDEX: 25.66 KG/M2 | TEMPERATURE: 98.5 F | DIASTOLIC BLOOD PRESSURE: 70 MMHG | HEART RATE: 84 BPM | WEIGHT: 139.44 LBS

## 2022-07-11 DIAGNOSIS — D50.9 IRON DEFICIENCY ANEMIA, UNSPECIFIED IRON DEFICIENCY ANEMIA TYPE: ICD-10-CM

## 2022-07-11 PROCEDURE — 99212 OFFICE O/P EST SF 10 MIN: CPT

## 2022-07-11 PROCEDURE — 99214 OFFICE O/P EST MOD 30 MIN: CPT | Performed by: INTERNAL MEDICINE

## 2022-07-11 ASSESSMENT — PAIN SCALES - GENERAL: PAINLEVEL: NO PAIN

## 2022-07-11 ASSESSMENT — FIBROSIS 4 INDEX: FIB4 SCORE: 1.71

## 2022-07-12 ENCOUNTER — NON-PROVIDER VISIT (OUTPATIENT)
Dept: CARDIOLOGY | Facility: MEDICAL CENTER | Age: 67
End: 2022-07-12
Payer: MEDICARE

## 2022-07-12 VITALS
DIASTOLIC BLOOD PRESSURE: 54 MMHG | WEIGHT: 131 LBS | BODY MASS INDEX: 23.95 KG/M2 | HEART RATE: 72 BPM | SYSTOLIC BLOOD PRESSURE: 113 MMHG

## 2022-07-12 DIAGNOSIS — E11.42 DIABETIC POLYNEUROPATHY ASSOCIATED WITH TYPE 2 DIABETES MELLITUS (HCC): ICD-10-CM

## 2022-07-12 PROCEDURE — 99211 OFF/OP EST MAY X REQ PHY/QHP: CPT | Performed by: INTERNAL MEDICINE

## 2022-07-12 RX ORDER — BLOOD-GLUCOSE METER
EACH MISCELLANEOUS
Qty: 1 KIT | Refills: 0 | Status: SHIPPED | OUTPATIENT
Start: 2022-07-12

## 2022-07-12 RX ORDER — BLOOD SUGAR DIAGNOSTIC
STRIP MISCELLANEOUS
Qty: 100 STRIP | Refills: 5 | Status: SHIPPED | OUTPATIENT
Start: 2022-07-12 | End: 2022-07-12

## 2022-07-12 ASSESSMENT — FIBROSIS 4 INDEX: FIB4 SCORE: 1.71

## 2022-07-12 NOTE — PROGRESS NOTES
CHF Pharmacotherapy visit - Visit    Utilized translation services for this encounter.  Language Line - [phone]  [facility] ID - [id number]   ?, ID # [id number]      Informed written consent was given on: 4/26/2022    Pac Keith is here for CHF    HPI  Pertinent Interval History since last visit:   • Patient reports he ran out of some of his medications    Most recent EF:  20%      Current Outpatient Medications:   •  spironolactone, 25 mg, Oral, DAILY  •  furosemide, 20 mg, Oral, DAILY  •  ferrous sulfate, 325 mg, Oral, DAILY  •  OneTouch Verio, CHECK BLOOD SUGAR ONE TIME PER DAY AND AS NEEDED FOR HIGH OR LOW SUGAR  •  aspirin, 81 mg, Oral, DAILY  •  senna-docusate, 1 Tablet, Oral, DAILY  •  magnesium hydroxide, 15 mL, Oral, DAILY  •  clopidogrel, 75 mg, Oral, DAILY  •  metoprolol SR, 25 mg, Oral, DAILY  •  rosuvastatin, 40 mg, Oral, Q EVENING  •  Lancets, Per insurance coverage. Check blood sugar 1x per day and prn ssx of high or low sugar  •  ONE TOUCH ULTRA 2, CHECK BLOOD SUGAR 1 TIME A DAY AND AS NEEDED  •  [employer] Prep, PER INSURANCE COVERAGE. WIPE SITE WITH PREP PAD PRIOR TO INJECTION  •  losartan, 12.5 mg, Oral, DAILY  •  Lantus SoloStar, 15 Units, Subcutaneous, Q EVENING  •  metFORMIN, 500 mg, Oral, BID WITH MEALS  •  vitamin D, 2,000 Units, Oral, DAILY    Current Adherence to CHF Therapies:  Partial- pt presents with some empty bottles of meds, but refills available at the pharmacy.     Current CHF Medications - including dose:   • Entresto or ACE/ARB: Losartan 12.5mg QD  • Beta blocker: Metoprolol XL 25mg QD   • Diuretic: Furosemide 20mg QD  • Aldosterone antagonist: Spironolactone 25mg QD    Home BP and HR:  Pt has not been monitoring at home    Change in weight: Stable    Exercise habits: no regular exercise program - but patient does sporadically walk and do some home exercises    Diet: common adult- focuses on low sodium diet    SOCIAL HISTORY  Social History     Tobacco Use   Smoking Status  Current Every Day Smoker   • Packs/day: 0.50   • Types: Cigarettes   • Last attempt to quit: 12/3/2016   • Years since quittin.6   Smokeless Tobacco Never Used   Tobacco Comment    10 CIGARETTES A DAY        DATA REVIEW  There were no vitals filed for this visit.    Lab Results   Component Value Date/Time    HBA1C 7.0 (H) 2022 12:21 PM          Lab Results   Component Value Date/Time    CHOLSTRLTOT 91 (L) 2022 12:21 PM    LDL 48 2022 12:21 PM    HDL 34 (A) 2022 12:21 PM    TRIGLYCERIDE 46 2022 12:21 PM       Lab Results   Component Value Date/Time    SODIUM 134 (L) 2022 07:03 AM    POTASSIUM 4.4 2022 07:03 AM    CHLORIDE 98 2022 07:03 AM    CO2 26 2022 07:03 AM    GLUCOSE 389 (H) 2022 07:03 AM    BUN 21 2022 07:03 AM    CREATININE 1.25 2022 07:03 AM    CREATININE 0.9 2005 04:08 PM     Lab Results   Component Value Date/Time    ALKPHOSPHAT 84 2022 11:31 AM    ASTSGOT 30 2022 11:31 AM    ALTSGPT 31 2022 11:31 AM    TBILIRUBIN 0.8 2022 11:31 AM    INR 1.11 2017 09:31 AM    ALBUMIN 3.9 2022 11:31 AM      No components found for: MICROALBUMINCREATRATIOURINE    Renal function:  Calculated creatinine clearance: ~71 ml/min      Latest Reference Range & Units 22 11:26   Creatinine 0.50 - 1.40 mg/dL 0.90   GFR (CKD-EPI) >60 mL/min/1.73 m 2 93 [1]     Other Pertinent Blood Work:     Other:  Immunization History   Administered Date(s) Administered   • Hepatitis B Vaccine (Adol/Adult) 2020   • Influenza (IM) Preservative Free - HISTORICAL DATA 2015   • Influenza Seasonal Injectable - Historical Data 10/08/2013, 2015   • Influenza Vaccine Adult HD 2020   • Influenza Vaccine Quad Inj (Pf) 10/12/2018, 10/04/2019   • Influenza Vaccine Quad Inj (Preserved) 10/27/2016   • PFIZER PURPLE CAP SARS-COV-2 VACCINATION (12+) 2021, 2021   • Pneumococcal polysaccharide vaccine (PPSV-23)  08/21/2020, 04/14/2022   • Tdap Vaccine 08/21/2020   • Zoster Vaccine Recombinant (RZV) (SHINGRIX) 08/21/2020, 01/26/2021     Up to date on pneumococcal vaccine? yes    Recent Imaging Studies:    None since last visit      ASSESSMENT AND PLAN  • Patient presents to clinic today for CHF follow up.   • Patient reports doing well with no complaints aside from needing to refill some of his medications. Patient instructed to call the pharmacy for refills  • Patient did report some SOB during household chores  • Gave them PAP for Farxiga and ordered a A1C, but they might get it before Dr. Rahman appt, but pt did not want labs today due to fatigue from last labs.   • No change in med today     Resulting CHF medications today (changes are bolded)  • Entresto or ACE/ARB: Losartan 12.5mg QD  • Beta blocker: Metoprolol XL 25mg QD   • Diuretic: Furosemide 20mg QD  • Aldosterone antagonist: Spironolactone 25mg QD    Lifestyle Recommendations From Today's Visit:   • Continue with low sodium diet  • Exercise as tolerated    Significant changes to laboratory values since last visit that require repeat labs:  • none    Blood Work Ordered At Today's visit:   None    Studies Ordered at Todays Visit:  None     Follow-Up:   5 weeks    Levy Scherer, PharmD, MS, BCACP, LCC  [facility]  Phone: [phone],  Fax: [phone]  On call: [phone]    This note was created using voice recognition software (Dragon). The accuracy of the dictation is limited by the abilities of the software. I have reviewed the note prior to signing, however some errors in grammar and context are still possible. If you have any questions related to this note please do not hesitate to contact our office.     CC:  Mitesh Sorto M.D.  No ref. provider found    Medications reconciled  Flow sheets updated

## 2022-08-15 NOTE — PROGRESS NOTES
CHF Pharmacotherapy visit - Visit    Date of Service: 08/16/22      Informed written consent was given on: 4/26/22    Pac Keith is here for CHF     Utilized translation services for this encounter.  Language Line - [phone] [facility]  [facility] ID - [id number]   ??, ID # [id number]      HPI  Pertinent Interval History since last visit:   None to report    Most recent EF:  20%      Current Outpatient Medications:     furosemide, 20 mg, Oral, DAILY, Taking    metoprolol SR, 25 mg, Oral, DAILY, Taking    losartan, 12.5 mg, Oral, DAILY, Taking    metFORMIN, TAKE 1 TABLET BY MOUTH TWICE A DAY WITH MEALS    ONE TOUCH ULTRA 2, CHECK BLOOD SUGAR 1 TIME A DAY AND AS NEEDED    OneTouch Verio, CHECK BLOOD SUGAR ONE-TWO TIME PER DAY AND AS NEEDED FOR HIGH OR LOW SUGAR    spironolactone, 25 mg, Oral, DAILY    ferrous sulfate, 325 mg, Oral, DAILY    aspirin, 81 mg, Oral, DAILY    senna-docusate, 1 Tablet, Oral, DAILY    magnesium hydroxide, 15 mL, Oral, DAILY    clopidogrel, 75 mg, Oral, DAILY    rosuvastatin, 40 mg, Oral, Q EVENING    Lancets, Per insurance coverage. Check blood sugar 1x per day and prn ssx of high or low sugar    [employer] Prep, PER INSURANCE COVERAGE. WIPE SITE WITH PREP PAD PRIOR TO INJECTION    Lantus SoloStar, 15 Units, Subcutaneous, Q EVENING    vitamin D, 2,000 Units, Oral, DAILY    Current Adherence to CHF Therapies:  Complete    Current CHF Medications - including dose:   Entresto or ACE/ARB: Losartan 12.5mg QD  Beta blocker: Metoprolol XL 25mg QD   Diuretic: Furosemide 20mg QD  Aldosterone antagonist: Spironolactone 25mg QD      Home BP and HR:  Pt does not monitor at home      Change in weight:  Patient is up about 5 lbs this visit    Exercise habits: no regular exercise program but patient reports he walks daily    Diet: Patient tries to focus on low sodium    SOCIAL HISTORY  Social History     Tobacco Use   Smoking Status Every Day    Packs/day: 0.50    Types: Cigarettes    Last attempt to  quit: 12/3/2016    Years since quittin.7   Smokeless Tobacco Never   Tobacco Comments    10 CIGARETTES A DAY        DATA REVIEW  Vitals:    22 0955   BP: 127/63   Pulse: 61       Lab Results   Component Value Date/Time    HBA1C 7.0 (H) 2022 12:21 PM          Lab Results   Component Value Date/Time    CHOLSTRLTOT 91 (L) 2022 12:21 PM    LDL 48 2022 12:21 PM    HDL 34 (A) 2022 12:21 PM    TRIGLYCERIDE 46 2022 12:21 PM       Lab Results   Component Value Date/Time    SODIUM 134 (L) 2022 07:03 AM    POTASSIUM 4.4 2022 07:03 AM    CHLORIDE 98 2022 07:03 AM    CO2 26 2022 07:03 AM    GLUCOSE 389 (H) 2022 07:03 AM    BUN 21 2022 07:03 AM    CREATININE 1.25 2022 07:03 AM    CREATININE 0.9 2005 04:08 PM     Lab Results   Component Value Date/Time    ALKPHOSPHAT 84 2022 11:31 AM    ASTSGOT 30 2022 11:31 AM    ALTSGPT 31 2022 11:31 AM    TBILIRUBIN 0.8 2022 11:31 AM    INR 1.11 2017 09:31 AM    ALBUMIN 3.9 2022 11:31 AM      No components found for: MICROALBUMINCREATRATIOURINE    Renal function:  Calculated creatinine clearance: ~47 ml/min      Latest Reference Range & Units 22 07:03   Creatinine 0.50 - 1.40 mg/dL 1.25   GFR (CKD-EPI) >60 mL/min/1.73 m 2 63     Other Pertinent Blood Work:     Other:  Immunization History   Administered Date(s) Administered    Hepatitis B Vaccine (Adol/Adult) 2020    Influenza (IM) Preservative Free - HISTORICAL DATA 2015    Influenza Seasonal Injectable - Historical Data 10/08/2013, 2015    Influenza Vaccine Adult HD 2020    Influenza Vaccine Quad Inj (Pf) 10/12/2018, 10/04/2019    Influenza Vaccine Quad Inj (Preserved) 10/27/2016    PFIZER PURPLE CAP SARS-COV-2 VACCINATION (12+) 2021, 2021    Pneumococcal polysaccharide vaccine (PPSV-23) 2020, 2022    Tdap Vaccine 2020    Zoster Vaccine Recombinant (RZV)  (SHINGRIX) 08/21/2020, 01/26/2021     Up to date on pneumococcal vaccine? yes    Recent Imaging Studies:    None since last visit      ASSESSMENT AND PLAN  Patient presents to clinic today for follow up on CHF  Patient does not monitor BP at home, but it is within range today in clinic.  Patient inquired about [place] paperwork to obtain Farxiga via PAP program.  They did not bring in their income verification as they are confused as to what is needed. They will obtain a bank statement as this is only verification they can obtain. They will bring in to office once obtained from bank.   Patient will continue with the current management and will follow up again in 6 weeks.     Resulting CHF medications today (changes are bolded)  Entresto or ACE/ARB: Losartan 12.5mg QD   Beta blocker: Metoprolol XL 25mg QD   Diuretic: Furosemide 20mg QD prn   Aldosterone antagonist: Spironolactone 25mg QD      Lifestyle Recommendations From Today's Visit:   Continue with low sodium diet  Exercise as tolerated     Significant changes to laboratory values since last visit that require repeat labs:  A1c due    Blood Work Ordered At Today's visit:   None    Studies Ordered at Todays Visit:  None     Follow-Up:   6 weeks    Tao Guzman  PharmD      CC:  Mitesh Sorto M.D.  No ref. provider found    Medications reconciled  Flow sheets updated

## 2022-08-16 ENCOUNTER — NON-PROVIDER VISIT (OUTPATIENT)
Dept: CARDIOLOGY | Facility: MEDICAL CENTER | Age: 67
End: 2022-08-16
Payer: MEDICARE

## 2022-08-16 VITALS — DIASTOLIC BLOOD PRESSURE: 63 MMHG | SYSTOLIC BLOOD PRESSURE: 127 MMHG | HEART RATE: 61 BPM

## 2022-08-16 PROCEDURE — 99211 OFF/OP EST MAY X REQ PHY/QHP: CPT | Performed by: STUDENT IN AN ORGANIZED HEALTH CARE EDUCATION/TRAINING PROGRAM

## 2022-09-20 ENCOUNTER — NON-PROVIDER VISIT (OUTPATIENT)
Dept: CARDIOLOGY | Facility: MEDICAL CENTER | Age: 67
End: 2022-09-20
Payer: MEDICARE

## 2022-09-20 VITALS
BODY MASS INDEX: 24.32 KG/M2 | DIASTOLIC BLOOD PRESSURE: 49 MMHG | WEIGHT: 133 LBS | HEART RATE: 79 BPM | SYSTOLIC BLOOD PRESSURE: 100 MMHG

## 2022-09-20 DIAGNOSIS — I25.5 ISCHEMIC CARDIOMYOPATHY: ICD-10-CM

## 2022-09-20 DIAGNOSIS — E78.5 DYSLIPIDEMIA: ICD-10-CM

## 2022-09-20 DIAGNOSIS — Z79.899 HIGH RISK MEDICATION USE: ICD-10-CM

## 2022-09-20 DIAGNOSIS — I50.22 CHF (CONGESTIVE HEART FAILURE), NYHA CLASS III, CHRONIC, SYSTOLIC (HCC): ICD-10-CM

## 2022-09-20 DIAGNOSIS — I10 ESSENTIAL HYPERTENSION: ICD-10-CM

## 2022-09-20 DIAGNOSIS — E11.42 DIABETIC POLYNEUROPATHY ASSOCIATED WITH TYPE 2 DIABETES MELLITUS (HCC): ICD-10-CM

## 2022-09-20 PROCEDURE — 99211 OFF/OP EST MAY X REQ PHY/QHP: CPT | Performed by: INTERNAL MEDICINE

## 2022-09-20 ASSESSMENT — FIBROSIS 4 INDEX: FIB4 SCORE: 1.71

## 2022-09-20 NOTE — PROGRESS NOTES
CHF Pharmacotherapy visit - Visit    Date of Service: 08/16/22    Informed written consent was given on: 4/26/22    Pac Keith is here for CHF     Utilized translation services for this encounter.  Language Line - [phone] - [place]  [facility] ID - [id number]   ??, ID # [id number]      HPI  Pertinent Interval History since last visit:   He reports feeling very dizzy and almost falling the last few days.    Most recent EF:  20%      Current Outpatient Medications:     metFORMIN, TAKE 1 TABLET BY MOUTH TWICE A DAY WITH MEALS    ONE TOUCH ULTRA 2, CHECK BLOOD SUGAR 1 TIME A DAY AND AS NEEDED    OneTouch Verio, CHECK BLOOD SUGAR ONE-TWO TIME PER DAY AND AS NEEDED FOR HIGH OR LOW SUGAR    spironolactone, 25 mg, Oral, DAILY    furosemide, 20 mg, Oral, DAILY    ferrous sulfate, 325 mg, Oral, DAILY    aspirin, 81 mg, Oral, DAILY    senna-docusate, 1 Tablet, Oral, DAILY    magnesium hydroxide, 15 mL, Oral, DAILY    clopidogrel, 75 mg, Oral, DAILY    metoprolol SR, 25 mg, Oral, DAILY    rosuvastatin, 40 mg, Oral, Q EVENING    Lancets, Per insurance coverage. Check blood sugar 1x per day and prn ssx of high or low sugar    [employer] Prep, PER INSURANCE COVERAGE. WIPE SITE WITH PREP PAD PRIOR TO INJECTION    losartan, 12.5 mg, Oral, DAILY    Lantus SoloStar, 15 Units, Subcutaneous, Q EVENING    vitamin D, 2,000 Units, Oral, DAILY    Current Adherence to CHF Therapies:  Complete    Current CHF Medications - including dose:   Entresto or ACE/ARB: Losartan 12.5mg QD  Beta blocker: Metoprolol XL 25mg QD   Diuretic: Furosemide 20mg QD  Aldosterone antagonist: Spironolactone 25mg QD      Home BP and HR:  Pt does not monitor at home      Change in weight  up 3 lbs     Exercise habits: no regular exercise program but patient reports he walks daily    Diet: Patient tries to focus on low sodium    SOCIAL HISTORY  Social History     Tobacco Use   Smoking Status Every Day    Packs/day: 0.50    Types: Cigarettes    Last attempt to quit:  12/3/2016    Years since quittin.8   Smokeless Tobacco Never   Tobacco Comments    10 CIGARETTES A DAY        DATA REVIEW  Vitals:    22 1605   BP: 100/49   Pulse: 79         Lab Results   Component Value Date/Time    HBA1C 7.0 (H) 2022 12:21 PM          Lab Results   Component Value Date/Time    CHOLSTRLTOT 91 (L) 2022 12:21 PM    LDL 48 2022 12:21 PM    HDL 34 (A) 2022 12:21 PM    TRIGLYCERIDE 46 2022 12:21 PM       Lab Results   Component Value Date/Time    SODIUM 134 (L) 2022 07:03 AM    POTASSIUM 4.4 2022 07:03 AM    CHLORIDE 98 2022 07:03 AM    CO2 26 2022 07:03 AM    GLUCOSE 389 (H) 2022 07:03 AM    BUN 21 2022 07:03 AM    CREATININE 1.25 2022 07:03 AM    CREATININE 0.9 2005 04:08 PM     Lab Results   Component Value Date/Time    ALKPHOSPHAT 84 2022 11:31 AM    ASTSGOT 30 2022 11:31 AM    ALTSGPT 31 2022 11:31 AM    TBILIRUBIN 0.8 2022 11:31 AM    INR 1.11 2017 09:31 AM    ALBUMIN 3.9 2022 11:31 AM      No components found for: MICROALBUMINCREATRATIOURINE    Renal function:  Calculated creatinine clearance: ~47 ml/min      Latest Reference Range & Units 22 07:03   Creatinine 0.50 - 1.40 mg/dL 1.25   GFR (CKD-EPI) >60 mL/min/1.73 m 2 63     Other Pertinent Blood Work:     Other:  Immunization History   Administered Date(s) Administered    Hepatitis B Vaccine (Adol/Adult) 2020    Influenza (IM) Preservative Free - HISTORICAL DATA 2015    Influenza Seasonal Injectable - Historical Data 10/08/2013, 2015    Influenza Vaccine Adult HD 2020    Influenza Vaccine Quad Inj (Pf) 10/12/2018, 10/04/2019    Influenza Vaccine Quad Inj (Preserved) 10/27/2016    PFIZER PURPLE CAP SARS-COV-2 VACCINATION (12+) 2021, 2021    Pneumococcal polysaccharide vaccine (PPSV-23) 2020, 2022    Tdap Vaccine 2020    Zoster Vaccine Recombinant (RZV) (SHINGRIX)  08/21/2020, 01/26/2021     Up to date on pneumococcal vaccine? yes    Recent Imaging Studies:    None since last visit      ASSESSMENT AND PLAN  Patient presents to clinic today for follow up on CHF and diabetes  Hypotensive in clinic today, and reports feeling dizzy and almost falling the last few days.  He also reports that he has not been eating very much and is using his Lantus twice daily  Difficult to determine if his symptoms today are combination of blood pressure and/or blood sugar.  To simplify his diabetes regimen I would like him to take 15 units of Lantus nightly and use the scale noted below  For his blood pressure, I will have him hold 2 of his blood pressure medications and slowly resume if his symptoms have resolved.    These are notes from the last appointment: Patient inquired about [place] paperwork to obtain Farxiga via PAP program.  They did not bring in their income verification as they are confused as to what is needed. They will obtain a bank statement as this is only verification they can obtain. They will bring in to office once obtained from bank.     Resulting CHF medications today (changes are bolded)  Entresto or ACE/ARB: Losartan 12.5mg QD  Beta blocker: Metoprolol XL 25mg QD   Diuretic: Furosemide 20mg QD prn   Aldosterone antagonist: Spironolactone 25mg QD  Hold losartan x 2 days  Hold spironolactone x 2 days   then restart 1st losartan then spironolactone if symptoms have resolved.       Use less lantus if not eating, and only us 15 units one at night.         Long-acting insulin:   Adjust dose according to FASTING BLOOD GLUCOSE target 100-130 mg/dL  (1) Increase the insulin dose every 3-4 days as needed.   (2) Increase by 2 units if FBG average concentration is 131-170 mg/dL.   (3) Increase by 4 units if FBG average concentration is 171-210 mg/dL.   (4) Increase by 6 units if FBG average concentration is 221-260 mg/dL.   (5) Increase by 8 units if FBG average concentration is  greater than 261mg/dL and call us.   Consider cutting back by 1-2 units to previous dose if glucose concentration is below 100 mg/dL or symptoms of hypoglycemia.       Lifestyle Recommendations From Today's Visit:   Continue with low sodium diet  Exercise as tolerated     Significant changes to laboratory values since last visit that require repeat labs:  A1c due    Blood Work Ordered At Today's visit:   A1c and CMP    Studies Ordered at Todays Visit:  None     Follow-Up:   1 weeks        CC:  Mitesh Sorto M.D.  No ref. provider found    Medications reconciled  Flow sheets updated

## 2022-09-20 NOTE — PATIENT INSTRUCTIONS
Hold losartan x 2 days  Hold spironolactone x 2 days   then restart 1st losartan for 2 days then spironolactone if symptoms have resolved.       Use less lantus if not eating, and only us 15 units one at night.       Long-acting insulin:   Adjust dose according to FASTING BLOOD GLUCOSE target 100-130 mg/dL  (1) Increase the insulin dose every 3-4 days as needed.   (2) Increase by 2 units if FBG average concentration is 131-170 mg/dL.   (3) Increase by 4 units if FBG average concentration is 171-210 mg/dL.   (4) Increase by 6 units if FBG average concentration is 221-260 mg/dL.   (5) Increase by 8 units if FBG average concentration is greater than 261mg/dL and call us.   Consider cutting back by 1-2 units to previous dose if glucose concentration is below 80 mg/dL or symptoms of hypoglycemia.

## 2022-09-27 ENCOUNTER — HOSPITAL ENCOUNTER (OUTPATIENT)
Dept: LAB | Facility: MEDICAL CENTER | Age: 67
End: 2022-09-27
Attending: NURSE PRACTITIONER
Payer: MEDICARE

## 2022-09-27 ENCOUNTER — NON-PROVIDER VISIT (OUTPATIENT)
Dept: CARDIOLOGY | Facility: MEDICAL CENTER | Age: 67
End: 2022-09-27
Payer: MEDICARE

## 2022-09-27 VITALS
HEART RATE: 85 BPM | SYSTOLIC BLOOD PRESSURE: 107 MMHG | BODY MASS INDEX: 23.77 KG/M2 | DIASTOLIC BLOOD PRESSURE: 66 MMHG | WEIGHT: 130 LBS

## 2022-09-27 DIAGNOSIS — E11.42 DIABETIC POLYNEUROPATHY ASSOCIATED WITH TYPE 2 DIABETES MELLITUS (HCC): ICD-10-CM

## 2022-09-27 LAB
ALBUMIN SERPL BCP-MCNC: 4.4 G/DL (ref 3.2–4.9)
ALBUMIN/GLOB SERPL: 1.3 G/DL
ALP SERPL-CCNC: 80 U/L (ref 30–99)
ALT SERPL-CCNC: 14 U/L (ref 2–50)
ANION GAP SERPL CALC-SCNC: 14 MMOL/L (ref 7–16)
AST SERPL-CCNC: 14 U/L (ref 12–45)
BILIRUB SERPL-MCNC: 0.2 MG/DL (ref 0.1–1.5)
BUN SERPL-MCNC: 22 MG/DL (ref 8–22)
CALCIUM SERPL-MCNC: 10 MG/DL (ref 8.5–10.5)
CHLORIDE SERPL-SCNC: 88 MMOL/L (ref 96–112)
CO2 SERPL-SCNC: 25 MMOL/L (ref 20–33)
CREAT SERPL-MCNC: 1.48 MG/DL (ref 0.5–1.4)
GFR SERPLBLD CREATININE-BSD FMLA CKD-EPI: 51 ML/MIN/1.73 M 2
GLOBULIN SER CALC-MCNC: 3.3 G/DL (ref 1.9–3.5)
GLUCOSE SERPL-MCNC: 485 MG/DL (ref 65–99)
POTASSIUM SERPL-SCNC: 4.1 MMOL/L (ref 3.6–5.5)
PROT SERPL-MCNC: 7.7 G/DL (ref 6–8.2)
SODIUM SERPL-SCNC: 127 MMOL/L (ref 135–145)

## 2022-09-27 PROCEDURE — 80053 COMPREHEN METABOLIC PANEL: CPT

## 2022-09-27 PROCEDURE — 36415 COLL VENOUS BLD VENIPUNCTURE: CPT

## 2022-09-27 PROCEDURE — 99211 OFF/OP EST MAY X REQ PHY/QHP: CPT | Performed by: INTERNAL MEDICINE

## 2022-09-27 RX ORDER — INSULIN ASPART 100 [IU]/ML
10 INJECTION, SOLUTION INTRAVENOUS; SUBCUTANEOUS
Qty: 10 ML | Refills: 6 | Status: SHIPPED | OUTPATIENT
Start: 2022-09-27 | End: 2022-09-30 | Stop reason: SDUPTHER

## 2022-09-27 ASSESSMENT — FIBROSIS 4 INDEX: FIB4 SCORE: 1.71

## 2022-09-27 NOTE — PROGRESS NOTES
CHF Pharmacotherapy visit - Visit    Date of Service: 08/16/22    Informed written consent was given on: 4/26/22    Pac Keith is here for CHF     Utilized translation services for this encounter.  Language Line - [phone] - [place]  [facility] ID - [id number]   ??, ID # [id number]      HPI  Pertinent Interval History since last visit:   At the last appointment he reported feeling dizzy and lightheaded.  I was having a hard time determining if this may be his blood pressure meds or his diabetes medication.    Most recent EF:  20%      Current Outpatient Medications:     metFORMIN, TAKE 1 TABLET BY MOUTH TWICE A DAY WITH MEALS    ONE TOUCH ULTRA 2, CHECK BLOOD SUGAR 1 TIME A DAY AND AS NEEDED    OneTouch Verio, CHECK BLOOD SUGAR ONE-TWO TIME PER DAY AND AS NEEDED FOR HIGH OR LOW SUGAR    spironolactone, 25 mg, Oral, DAILY    furosemide, 20 mg, Oral, DAILY    ferrous sulfate, 325 mg, Oral, DAILY    aspirin, 81 mg, Oral, DAILY    senna-docusate, 1 Tablet, Oral, DAILY    magnesium hydroxide, 15 mL, Oral, DAILY    clopidogrel, 75 mg, Oral, DAILY    metoprolol SR, 25 mg, Oral, DAILY    rosuvastatin, 40 mg, Oral, Q EVENING    Lancets, Per insurance coverage. Check blood sugar 1x per day and prn ssx of high or low sugar    [employer] Prep, PER INSURANCE COVERAGE. WIPE SITE WITH PREP PAD PRIOR TO INJECTION    losartan, 12.5 mg, Oral, DAILY    Lantus SoloStar, 15 Units, Subcutaneous, Q EVENING    vitamin D, 2,000 Units, Oral, DAILY    Current Adherence to CHF Therapies:  Complete    Current CHF Medications - including dose:   Entresto or ACE/ARB: Losartan 12.5mg QD-currently holding  Beta blocker: Metoprolol XL 25mg QD   Diuretic: Furosemide 20mg QD  Aldosterone antagonist: Spironolactone 12.5mg QD-currently holding      Home BP and HR:  Pt does not monitor at home      Change in weight: stable     Exercise habits: no regular exercise program but patient reports he walks daily    Diet: Patient tries to focus on low  sodium    SOCIAL HISTORY  Social History     Tobacco Use   Smoking Status Every Day    Packs/day: 0.50    Types: Cigarettes    Last attempt to quit: 12/3/2016    Years since quittin.8   Smokeless Tobacco Never   Tobacco Comments    10 CIGARETTES A DAY        DATA REVIEW  Vitals:    22 1025   BP: 107/66   Pulse: 85           Lab Results   Component Value Date/Time    HBA1C 7.0 (H) 2022 12:21 PM          Lab Results   Component Value Date/Time    CHOLSTRLTOT 91 (L) 2022 12:21 PM    LDL 48 2022 12:21 PM    HDL 34 (A) 2022 12:21 PM    TRIGLYCERIDE 46 2022 12:21 PM       Lab Results   Component Value Date/Time    SODIUM 134 (L) 2022 07:03 AM    POTASSIUM 4.4 2022 07:03 AM    CHLORIDE 98 2022 07:03 AM    CO2 26 2022 07:03 AM    GLUCOSE 389 (H) 2022 07:03 AM    BUN 21 2022 07:03 AM    CREATININE 1.25 2022 07:03 AM    CREATININE 0.9 2005 04:08 PM     Lab Results   Component Value Date/Time    ALKPHOSPHAT 84 2022 11:31 AM    ASTSGOT 30 2022 11:31 AM    ALTSGPT 31 2022 11:31 AM    TBILIRUBIN 0.8 2022 11:31 AM    INR 1.11 2017 09:31 AM    ALBUMIN 3.9 2022 11:31 AM      No components found for: MICROALBUMINCREATRATIOURINE    Renal function:  Calculated creatinine clearance: ~47 ml/min      Latest Reference Range & Units 22 07:03   Creatinine 0.50 - 1.40 mg/dL 1.25   GFR (CKD-EPI) >60 mL/min/1.73 m 2 63     Other Pertinent Blood Work:     Other:  Immunization History   Administered Date(s) Administered    Hepatitis B Vaccine (Adol/Adult) 2020    Influenza (IM) Preservative Free - HISTORICAL DATA 2015    Influenza Seasonal Injectable - Historical Data 10/08/2013, 2015    Influenza Vaccine Adult HD 2020    Influenza Vaccine Quad Inj (Pf) 10/12/2018, 10/04/2019    Influenza Vaccine Quad Inj (Preserved) 10/27/2016    PFIZER PURPLE CAP SARS-COV-2 VACCINATION (12+) 2021,  05/26/2021    Pneumococcal polysaccharide vaccine (PPSV-23) 08/21/2020, 04/14/2022    Tdap Vaccine 08/21/2020    Zoster Vaccine Recombinant (RZV) (SHINGRIX) 08/21/2020, 01/26/2021     Up to date on pneumococcal vaccine? yes    Recent Imaging Studies:    None since last visit      ASSESSMENT AND PLAN  Patient presents to clinic today for follow up on CHF and diabetes  Reports blood sugar in the 400 but I want him to get labs ASAP to verify as it is very difficult to determine what his blood sugars are based on the interview with him and his wife today using translation services.  His daughter helps with his medication, but she was not in clinic today.  Stopping his blood pressure medication did not make any difference and he still continues to feel shaky and dizzy.  I suspect this may be due to poor diabetes control.  He also complains about excessive thirst.  Based on our interview today, using translation services, I will restart his blood pressure medications and add mealtime insulin.  Due to the challenging nature of this appointment I scheduled him for a appointment with us on Monday to verify he is safely using his insulin as he is at high risk patient.  Adding Farxiga or a GLP-1 may be beneficial depending on his A1c, but cost may be a barrier, and his low body weight make these agents challenging.  If we do start Farxiga, we would likely need to decrease his blood pressure medications and/or monitor closely.    Addendum:  Chart review at 3:40 PM on 9/27/2022.  Does not appear patient's went to the lab as recommended.  We will need to reiterate this on Monday.  May be beneficial to physically walk patient to the lab after their appointment.    Resulting CHF medications today (changes are bolded)  Entresto or ACE/ARB: Restart losartan 12.5mg QD  Beta blocker: Metoprolol XL 25mg QD   Diuretic: Furosemide 20mg QD prn   Aldosterone antagonist: Restart spironolactone 12.5mg QD      Diabetes medications:  Use lantus  20 units twice daily (per pt)    Continue with Metformin twice daily   Start novolog 10 units with meals.        (1) Rapid-acting insulin     Glucose reading  Change to be made to insulin dose    < 80 mg/dL  Subtract 1 unit     mg/dL  10 units.   Usual premeal dose   (fixed or based on carbohydrate intake)    126-170 mg/dL  Add 1 unit    171-215 mg/dL  Add 2 units    216-260 mg/dL  Add 3 units         Long-acting insulin:   Adjust dose according to FASTING BLOOD GLUCOSE target 100-130 mg/dL  (1) Increase the insulin dose every 3-4 days as needed.   (2) Increase by 2 units if FBG average concentration is 131-170 mg/dL.   (3) Increase by 4 units if FBG average concentration is 171-210 mg/dL.   (4) Increase by 6 units if FBG average concentration is 221-260 mg/dL.   (5) Increase by 8 units if FBG average concentration is greater than 261mg/dL and call us.   Consider cutting back by 1-2 units to previous dose if glucose concentration is below 100 mg/dL or symptoms of hypoglycemia.       Lifestyle Recommendations From Today's Visit:   Continue with low sodium diet  Exercise as tolerated     Significant changes to laboratory values since last visit that require repeat labs:  A1c due    Blood Work Ordered At Today's visit:   A1c and CMP      Studies Ordered at Todays Visit:  None     Follow-Up:   1 weeks        CC:  Mitesh Sorto M.D.  No ref. provider found    Medications reconciled  Flow sheets updated

## 2022-09-27 NOTE — Clinical Note
Stu,  I had to schedule him Monday in our pharmacotherapy clinic at the main [place].  We see him in the CHF clinic, but I need to get him in again next week due to the language barrier as well as the high risk nature of starting mealtime insulin.  We were 100% booked for the next few weeks so I figured this was the best thing for the patient.  Let me know if there are any problems with this.  Thanks, Vladimir

## 2022-09-27 NOTE — PATIENT INSTRUCTIONS
Entresto or ACE/ARB: Losartan 12.5mg QD  Beta blocker: Metoprolol XL 25mg QD   Diuretic: Furosemide 20mg QD prn   Aldosterone antagonist: Spironolactone 12.5mg QD   Restart losartan    Restart spironolactone            Use lantus 20 units twice daily    Continue with Metformin twice daily   Start novolog 10 units with meals.        (1) Rapid-acting insulin     Glucose reading  Change to be made to insulin dose    < 80 mg/dL  Subtract 1 unit     mg/dL  10 units.   Usual premeal dose   (fixed or based on carbohydrate intake)    126-170 mg/dL  Add 1 unit    171-215 mg/dL  Add 2 units    216-260 mg/dL  Add 3 units           Long-acting insulin:   Adjust dose according to FASTING BLOOD GLUCOSE target 100-130 mg/dL  (1) Increase the insulin dose every 3-4 days as needed.   (2) Increase by 2 units if FBG average concentration is 131-170 mg/dL.   (3) Increase by 4 units if FBG average concentration is 171-210 mg/dL.   (4) Increase by 6 units if FBG average concentration is 221-260 mg/dL.   (5) Increase by 8 units if FBG average concentration is greater than 261mg/dL and call us.   Consider cutting back by 1-2 units to previous dose if glucose concentration is below 100 mg/dL or symptoms of hypoglycemia.

## 2022-09-29 ENCOUNTER — DOCUMENTATION (OUTPATIENT)
Dept: MEDICAL GROUP | Facility: PHYSICIAN GROUP | Age: 67
End: 2022-09-29
Payer: MEDICARE

## 2022-09-29 NOTE — PROGRESS NOTES
After discussion with medical group doctors today, I was recommended to send patient to the ER for his labs and symptoms.     He reported feeling dizzy and lightheaded at his most recent appointment.  He also complained of elevated blood glucose levels, so I recommended a stat lab to verify.    I did start him on mealtime insulin based on his reported blood sugar, but due to language barriers he has consistently used medications incorrectly and the safest course of action would be medical supervision until his glucose is more appropriately controlled.    The following service was used to leave a voice message with the patient    Utilized translation services for this encounter.  Language Line - [phone] - [place]  [facility] ID - [id number]   ?, ID # [id number]      Lab Results   Component Value Date/Time    SODIUM 127 (L) 09/27/2022 12:45 PM    POTASSIUM 4.1 09/27/2022 12:45 PM    CHLORIDE 88 (L) 09/27/2022 12:45 PM    CO2 25 09/27/2022 12:45 PM    GLUCOSE 485 (H) 09/27/2022 12:45 PM    BUN 22 09/27/2022 12:45 PM    CREATININE 1.48 (H) 09/27/2022 12:45 PM    CREATININE 0.9 12/29/2005 04:08 PM        Levy Scherer, PharmD, MS, BCACP, LCC  [facility]  Phone: [phone]  Fax: [phone]  On call: [phone]  General scheduling/information [phone]  For emergencies please dial 911  Please do not use [facility] for urgent matters, call the phone numbers listed above.      Addendum 9/30/22 at 10:11am   Called again using translation services and left him a voice message to go to the ER

## 2022-10-03 ENCOUNTER — NON-PROVIDER VISIT (OUTPATIENT)
Dept: VASCULAR LAB | Facility: MEDICAL CENTER | Age: 67
End: 2022-10-03
Attending: INTERNAL MEDICINE
Payer: MEDICARE

## 2022-10-03 DIAGNOSIS — E11.42 DIABETIC POLYNEUROPATHY ASSOCIATED WITH TYPE 2 DIABETES MELLITUS (HCC): ICD-10-CM

## 2022-10-03 LAB
HBA1C MFR BLD: NORMAL % (ref ?–5.8)
INT CON NEG: NORMAL
INT CON POS: NORMAL

## 2022-10-03 PROCEDURE — 99213 OFFICE O/P EST LOW 20 MIN: CPT

## 2022-10-03 PROCEDURE — 83036 HEMOGLOBIN GLYCOSYLATED A1C: CPT

## 2022-10-03 NOTE — PROGRESS NOTES
Patient Consult Note    Utilized translation services for this encounter.  Language Line - [phone]  [facility] ID - [id number]   Sharif Levine, ID # [id number]      Primary care physician: Mitesh Sorto M.D.    Reason for consult: Management of Uncontrolled Type 2 Diabetes    HPI:  Chucky Keith is a 66 y.o. old patient who comes in today for evaluation of above stated problem.    Most Recent HbA1c and POCT glucose:   Lab Results   Component Value Date/Time    HBA1C  10/03/2022 12:00 AM      Comment:      A1C too high            Most Recent Scr:  Lab Results   Component Value Date/Time    CREATININE 1.48 (H) 09/27/2022 12:45 PM    CREATININE 0.9 12/29/2005 04:08 PM        Current Diabetes Medication Regimen  Metformin: IR 500mg BID with meals       Basal Insulin: glargine 15 qpm  Prandial Insulin: 10 TID AC      Previous Diabetes Medications and Reason for Discontinuation  N/A    Pt has home glucometer and proper testing technique - No, has been unable to obtain. Has already received prescription, but it's not covered by [facility]. Advised patient that next best option would likely be the true-metrix glucometers and glucose strips, as they are the cheapest cash option I am aware of.      Hypoglycemia awareness - describes weakness and numbness in legs, more reflective of neuropathy  Nocturnal hypoglycemia- none  Hypoglycemia:  None    Pt's treatment of Hypoglycemia - Stands still until shakiness goes away, then the tremors go away after 10 minutes. Likely to be neuropathy  - 15:15 Rule    Current Exercise - Walks for half an hour, every day  Exercise Goal - Continue exercising    Dietary - vegetables, meat, soup, 3 table spoons of rice daily. Rarely fruits.  Lunch - Noodles, bread, porridge  Dinner - rice, meat, and vegetables  Snacks - Biscuits  Drinks - water, coffee with sugar. No soda.      Preventative Management  BP regimen (ACE/ARB) - Losartan 12.5 mg every day  ASA - 81 mg   Statin - rosuvastatin 40 qpm  Last  Retinal Scan - 2022  Last Foot Exam - 2022  Last A1c -   Lab Results   Component Value Date/Time    HBA1C  10/03/2022 12:00 AM      Comment:      A1C too high      Last Microalbuminuria -    Latest Reference Range & Units 3/28/22 12:21   Micro Alb Creat Ratio 0 - 30 mg/g 267 (H)   (H): Data is abnormally high    updated caregaps    Past Medical History:  Patient Active Problem List    Diagnosis Date Noted    Diabetic retinopathy associated with type 2 diabetes mellitus (HCC) 04/15/2022    CAD (coronary artery disease) 2022    Ischemic cardiomyopathy 2022    Congestive heart failure (HCC) 2022    Leg edema 2022    Right inguinal hernia 2022    Pelvic fluid collection 2022    Tobacco dependence 2022    Elevated troponin 2022    Liver enzyme elevation 2022    Proteinuria 2022    Anemia 2022    History of hepatitis B 2016    Vitamin D deficiency 2016    Dyslipidemia 2016    Diabetic polyneuropathy associated with type 2 diabetes mellitus (HCC) 2016    Essential hypertension 2016       Past Surgical History:  No past surgical history on file.    Allergies:  Patient has no known allergies.    Social History:  Social History     Socioeconomic History    Marital status:      Spouse name: Not on file    Number of children: Not on file    Years of education: Not on file    Highest education level: Not on file   Occupational History    Not on file   Tobacco Use    Smoking status: Every Day     Packs/day: 0.50     Types: Cigarettes     Last attempt to quit: 12/3/2016     Years since quittin.8    Smokeless tobacco: Never    Tobacco comments:     10 CIGARETTES A DAY   Vaping Use    Vaping Use: Never used   Substance and Sexual Activity    Alcohol use: No     Alcohol/week: 0.0 oz    Drug use: No    Sexual activity: Not on file   Other Topics Concern    Not on file   Social History Narrative    Not on file     Social  Determinants of Health     Financial Resource Strain: Not on file   Food Insecurity: Not on file   Transportation Needs: Not on file   Physical Activity: Not on file   Stress: Not on file   Social Connections: Not on file   Intimate Partner Violence: Not on file   Housing Stability: Not on file       Family History:  Family History   Problem Relation Age of Onset    Heart Disease Neg Hx        Medications:    Current Outpatient Medications:     insulin lispro (HUMALOG,ADMELOG) 100 UNIT/ML Solution Pen-injector injection PEN, Inject 10 Units under the skin 3 times a day before meals. And adjust per sliding scale as provided in clinic., Disp: 10 mL, Rfl: 6    metFORMIN (GLUCOPHAGE) 500 MG Tab, TAKE 1 TABLET BY MOUTH TWICE A DAY WITH MEALS, Disp: 180 Tablet, Rfl: 2    Blood Glucose Monitoring Suppl (ONE TOUCH ULTRA 2) w/Device Kit, CHECK BLOOD SUGAR 1 TIME A DAY AND AS NEEDED, Disp: 1 Kit, Rfl: 0    ONETOUCH VERIO strip, CHECK BLOOD SUGAR ONE-TWO TIME PER DAY AND AS NEEDED FOR HIGH OR LOW SUGAR, Disp: 100 Strip, Rfl: 5    spironolactone (ALDACTONE) 25 MG Tab, Take 1 Tablet by mouth every day., Disp: 90 Tablet, Rfl: 3    furosemide (LASIX) 20 MG Tab, Take 1 Tablet by mouth every day. With additional tablet in afternoon for weight gain greater than 3 lbs in 1 day or 5 lbs in 1 week, Disp: 60 Tablet, Rfl: 3    ferrous sulfate 325 (65 Fe) MG tablet, Take 1 Tablet by mouth every day., Disp: 90 Tablet, Rfl: 3    aspirin 81 MG EC tablet, Take 1 Tablet by mouth every day., Disp: 100 Tablet, Rfl: 4    senna-docusate (SENNA PLUS) 8.6-50 MG Tab, Take 1 Tablet by mouth every day., Disp: 100 Tablet, Rfl: 1    magnesium hydroxide (MILK OF MAGNESIA) 400 MG/5ML Suspension, Take 15 mL by mouth every day., Disp: , Rfl:     clopidogrel (PLAVIX) 75 MG Tab, Take 1 Tablet by mouth every day., Disp: 90 Tablet, Rfl: 3    metoprolol SR (TOPROL XL) 25 MG TABLET SR 24 HR, Take 1 Tablet by mouth every day., Disp: 90 Tablet, Rfl: 3    rosuvastatin  (CRESTOR) 40 MG tablet, Take 1 Tablet by mouth every evening., Disp: 90 Tablet, Rfl: 3    Lancets, Per insurance coverage. Check blood sugar 1x per day and prn ssx of high or low sugar, Disp: 200 Each, Rfl: 6    Alcohol Swabs (CVS PREP) 70 % Pads, PER INSURANCE COVERAGE. WIPE SITE WITH PREP PAD PRIOR TO INJECTION, Disp: 200 Each, Rfl: 5    losartan (COZAAR) 25 MG Tab, Take 0.5 Tablets by mouth every day., Disp: 30 Tablet, Rfl: 6    insulin glargine (LANTUS SOLOSTAR) 100 UNIT/ML Solution Pen-injector injection, Inject 15 Units under the skin every evening. (Patient taking differently: Inject 20 Units under the skin 2 times a day.), Disp: 5 Each, Rfl: 6    vitamin D (CHOLECALCIFEROL) 1000 UNIT Tab, Take 2 Tabs by mouth every day., Disp: 30 Tab, Rfl: 3    Labs: Reviewed    Physical Examination:  Vital signs: There were no vitals taken for this visit. There is no height or weight on file to calculate BMI.    Assessment and Plan:    1. DM2  Basic physiology of DMII was explained to patient as well as microvascular/macrovascular complications. The importance of increasing physical activity to improve diabetes control was discussed with the patient. Patient was also educated on changing diet and making better choices to help control blood sugar.   Discussed Goals: FBG 80 - 130, 2hPP < 180, a1c < 7.0%   Significant language barrier, as both patient and partner required translations.  Patient was supposed to continue insulin aspart 2 weeks ago, but has not been able to obtain medication from [facility]  Recently, patient has had an upset stomach for the past month. Patient unsure if result of metformin or medication changes from heart failure visit (09/27/2022)  Wife manages medications, daughter refills them  A1c today is too high for our machine to register, diabetes is currently uncontrolled.  After visit, called [facility] to verify insulin aspart status, medication is ready for pickup. Left voicemail to daughter Yen notifying that  medication was ready.      - Medication changes:  Restart insulin lispro 10 mg TID, verified filled by pharmacy  Due to GI symptoms, would defer increasing metformin dose at this time.    - Lifestyle changes:  Reduce rice, noodle, and bread intake as much as possible.    Follow Up:  1 month, ensure access to medications, glucometer, and follow-up blood glucose logs. Would consider increasing metformin or initiating insulin.    Demond Koch, PharmD    CC:   BENOIT King M.D.

## 2022-10-11 ENCOUNTER — HOSPITAL ENCOUNTER (OUTPATIENT)
Dept: CARDIOLOGY | Facility: MEDICAL CENTER | Age: 67
End: 2022-10-11
Attending: NURSE PRACTITIONER
Payer: MEDICARE

## 2022-10-11 ENCOUNTER — HOSPITAL ENCOUNTER (OUTPATIENT)
Dept: LAB | Facility: MEDICAL CENTER | Age: 67
End: 2022-10-11
Attending: PAIN MEDICINE
Payer: MEDICARE

## 2022-10-11 ENCOUNTER — HOSPITAL ENCOUNTER (OUTPATIENT)
Dept: LAB | Facility: MEDICAL CENTER | Age: 67
End: 2022-10-11
Attending: INTERNAL MEDICINE
Payer: MEDICARE

## 2022-10-11 ENCOUNTER — TELEPHONE (OUTPATIENT)
Dept: CARDIOLOGY | Facility: MEDICAL CENTER | Age: 67
End: 2022-10-11

## 2022-10-11 DIAGNOSIS — E11.42 DIABETIC POLYNEUROPATHY ASSOCIATED WITH TYPE 2 DIABETES MELLITUS (HCC): ICD-10-CM

## 2022-10-11 DIAGNOSIS — I10 ESSENTIAL HYPERTENSION: Chronic | ICD-10-CM

## 2022-10-11 DIAGNOSIS — I50.21 ACUTE SYSTOLIC HEART FAILURE (HCC): ICD-10-CM

## 2022-10-11 DIAGNOSIS — I25.10 CORONARY ARTERY DISEASE INVOLVING NATIVE HEART WITHOUT ANGINA PECTORIS, UNSPECIFIED VESSEL OR LESION TYPE: ICD-10-CM

## 2022-10-11 DIAGNOSIS — Z79.899 HIGH RISK MEDICATION USE: ICD-10-CM

## 2022-10-11 DIAGNOSIS — E78.5 DYSLIPIDEMIA: Chronic | ICD-10-CM

## 2022-10-11 DIAGNOSIS — D50.9 IRON DEFICIENCY ANEMIA, UNSPECIFIED IRON DEFICIENCY ANEMIA TYPE: ICD-10-CM

## 2022-10-11 DIAGNOSIS — I10 ESSENTIAL HYPERTENSION: ICD-10-CM

## 2022-10-11 DIAGNOSIS — I25.5 ISCHEMIC CARDIOMYOPATHY: ICD-10-CM

## 2022-10-11 LAB
ANION GAP SERPL CALC-SCNC: 9 MMOL/L (ref 7–16)
BASOPHILS # BLD AUTO: 0.8 % (ref 0–1.8)
BASOPHILS # BLD: 0.06 K/UL (ref 0–0.12)
BUN SERPL-MCNC: 13 MG/DL (ref 8–22)
CALCIUM SERPL-MCNC: 9.3 MG/DL (ref 8.5–10.5)
CHLORIDE SERPL-SCNC: 97 MMOL/L (ref 96–112)
CO2 SERPL-SCNC: 27 MMOL/L (ref 20–33)
CREAT SERPL-MCNC: 1.26 MG/DL (ref 0.5–1.4)
EOSINOPHIL # BLD AUTO: 0.26 K/UL (ref 0–0.51)
EOSINOPHIL NFR BLD: 3.3 % (ref 0–6.9)
ERYTHROCYTE [DISTWIDTH] IN BLOOD BY AUTOMATED COUNT: 44.2 FL (ref 35.9–50)
FERRITIN SERPL-MCNC: 782 NG/ML (ref 22–322)
GFR SERPLBLD CREATININE-BSD FMLA CKD-EPI: 62 ML/MIN/1.73 M 2
GLUCOSE SERPL-MCNC: 564 MG/DL (ref 65–99)
HCT VFR BLD AUTO: 36.9 % (ref 42–52)
HGB BLD-MCNC: 11.8 G/DL (ref 14–18)
IMM GRANULOCYTES # BLD AUTO: 0.02 K/UL (ref 0–0.11)
IMM GRANULOCYTES NFR BLD AUTO: 0.3 % (ref 0–0.9)
IRON SATN MFR SERPL: 34 % (ref 15–55)
IRON SERPL-MCNC: 72 UG/DL (ref 50–180)
LV EJECT FRACT  99904: 55
LV EJECT FRACT MOD 2C 99903: 55.71
LV EJECT FRACT MOD 4C 99902: 52.47
LV EJECT FRACT MOD BP 99901: 53.29
LYMPHOCYTES # BLD AUTO: 2.95 K/UL (ref 1–4.8)
LYMPHOCYTES NFR BLD: 37.5 % (ref 22–41)
MCH RBC QN AUTO: 30.1 PG (ref 27–33)
MCHC RBC AUTO-ENTMCNC: 32 G/DL (ref 33.7–35.3)
MCV RBC AUTO: 94.1 FL (ref 81.4–97.8)
MONOCYTES # BLD AUTO: 0.55 K/UL (ref 0–0.85)
MONOCYTES NFR BLD AUTO: 7 % (ref 0–13.4)
NEUTROPHILS # BLD AUTO: 4.02 K/UL (ref 1.82–7.42)
NEUTROPHILS NFR BLD: 51.1 % (ref 44–72)
NRBC # BLD AUTO: 0 K/UL
NRBC BLD-RTO: 0 /100 WBC
PLATELET # BLD AUTO: 224 K/UL (ref 164–446)
PMV BLD AUTO: 9.3 FL (ref 9–12.9)
POTASSIUM SERPL-SCNC: 4.8 MMOL/L (ref 3.6–5.5)
RBC # BLD AUTO: 3.92 M/UL (ref 4.7–6.1)
SODIUM SERPL-SCNC: 133 MMOL/L (ref 135–145)
TIBC SERPL-MCNC: 210 UG/DL (ref 250–450)
UIBC SERPL-MCNC: 138 UG/DL (ref 110–370)
WBC # BLD AUTO: 7.9 K/UL (ref 4.8–10.8)

## 2022-10-11 PROCEDURE — 85025 COMPLETE CBC W/AUTO DIFF WBC: CPT

## 2022-10-11 PROCEDURE — 93306 TTE W/DOPPLER COMPLETE: CPT | Mod: 26 | Performed by: INTERNAL MEDICINE

## 2022-10-11 PROCEDURE — 82728 ASSAY OF FERRITIN: CPT

## 2022-10-11 PROCEDURE — 36415 COLL VENOUS BLD VENIPUNCTURE: CPT

## 2022-10-11 PROCEDURE — 83540 ASSAY OF IRON: CPT

## 2022-10-11 PROCEDURE — 83550 IRON BINDING TEST: CPT

## 2022-10-11 PROCEDURE — 80048 BASIC METABOLIC PNL TOTAL CA: CPT

## 2022-10-11 PROCEDURE — 93306 TTE W/DOPPLER COMPLETE: CPT

## 2022-10-12 ENCOUNTER — TELEPHONE (OUTPATIENT)
Dept: MEDICAL GROUP | Facility: PHYSICIAN GROUP | Age: 67
End: 2022-10-12
Payer: MEDICARE

## 2022-10-12 NOTE — TELEPHONE ENCOUNTER
Called by lab from critical glucose value. Rooting to PCP and can we call Dr. Sorto's office first thing in the AM to help ensure they know about this?  I will cc this to him as well. Thanks team!

## 2022-10-12 NOTE — TELEPHONE ENCOUNTER
Phone Number Called: [phone] (home)       Call outcome: Did not leave a detailed message. Requested patient to call back.    Message: Phone went straight to .  full. Unable to M SMS notification sent

## 2022-10-12 NOTE — TELEPHONE ENCOUNTER
Called: [phone]     1st attempt to contact patient    Did not answer, left VM to call back at [phone] for more information     Called a 2nd time VM is full; sent a SMS message via voicemail.

## 2022-10-12 NOTE — TELEPHONE ENCOUNTER
Please call .   Sugar level is extremily high over 500   Please advise pt to go ER now immediate evaluation and treatment    If you are unable to contact pt by phone.  Please contact next of kin /Daughter Yen listed in chart    Please let me know if you are unable to get in touch with both pt and Daughter > please let me know    Thanks.

## 2022-10-12 NOTE — TELEPHONE ENCOUNTER
Chart note:    10/12/2022 at 3:55 PM     Pt's name: Pac [facility] [phone] (home)    /  PCP: Mitesh Sorto M.D..    -Our office has been notified by Dr. Reddy today that the patient has critical value with glucose level 564    I have tried to call the patient personally on my own total 4 times since this morning, unsuccessful.  Patient's phone number and next of kin daughter all have the same number    I have also requested my medical assistant Mr Cris Reyes to call the patient several times also unsuccessful.    At this time I have instructed my medical assistant to contact the [place] [employer] to send someone out to the patient's house to do a welfare check              Mitesh Sorto M.D.

## 2022-10-13 ENCOUNTER — TELEPHONE (OUTPATIENT)
Dept: MEDICAL GROUP | Facility: PHYSICIAN GROUP | Age: 67
End: 2022-10-13
Payer: MEDICARE

## 2022-10-13 DIAGNOSIS — E11.42 DIABETIC POLYNEUROPATHY ASSOCIATED WITH TYPE 2 DIABETES MELLITUS (HCC): ICD-10-CM

## 2022-10-13 NOTE — TELEPHONE ENCOUNTER
This pt with very high sugar level >500s  Pt refused to go to emergency room dept after police doing a welfare check and pt also refused appt to see pcp    Recommendation: I have submitted a referral for pt to be evaluated by ENDOCRINOLOGIST    pLEASE   scan the letter in chart  >>then send a letter to pt. Pls see my note below   Please document in note the date and time when you mailed out the letter  ---------------------------------------------    Dear Mr Keith,  Your recent  blood test showed a very high sugar level over 500s.  [Normal level below 100]    Yesterday we have requested the police to come to your house for a welfare check as we were unable to get in touch with you by telephone.      We also requested  for the  to recommend for you to go to the emergency room yesterday for immediate evaluation and treatment however the officer stated that he refused to go to the emergency room.    Therefore, at this time I would like to submit a referral for you to be evaluated by diabetes specialist [ENDOCRINOLOGIST] for consultation.  It is very important to get your diabetes under control    After a few days, please call [facility] Referral Department at [phone] to check the status authorization of the referral.

## 2022-10-13 NOTE — TELEPHONE ENCOUNTER
VOICEMAIL  1. Caller Name: Officer Sanchez                      Call Back Number: [phone]    2. Message: Officer that took wellfare check called to notify us that the patient and his daughter were at home and they seemed fine. The patient seemed fined and he was offered medical attention but the patient denied it and stated that he was okay. Officer Bradford provided me with the reference number in case of needing further details. Reference number is [id number]    3. Patient approves office to leave a detailed voicemail/MyChart message: N\A

## 2022-10-14 ENCOUNTER — OFFICE VISIT (OUTPATIENT)
Dept: CARDIOLOGY | Facility: MEDICAL CENTER | Age: 67
End: 2022-10-14
Payer: MEDICARE

## 2022-10-14 DIAGNOSIS — E11.42 DIABETIC POLYNEUROPATHY ASSOCIATED WITH TYPE 2 DIABETES MELLITUS (HCC): ICD-10-CM

## 2022-10-14 DIAGNOSIS — R80.0 ISOLATED PROTEINURIA WITHOUT SPECIFIC MORPHOLOGIC LESION: Chronic | ICD-10-CM

## 2022-10-14 DIAGNOSIS — F17.200 TOBACCO DEPENDENCE: Chronic | ICD-10-CM

## 2022-10-14 DIAGNOSIS — R79.89 ELEVATED TROPONIN: ICD-10-CM

## 2022-10-14 DIAGNOSIS — E78.5 DYSLIPIDEMIA: Chronic | ICD-10-CM

## 2022-10-14 DIAGNOSIS — D64.9 ANEMIA, UNSPECIFIED TYPE: Chronic | ICD-10-CM

## 2022-10-14 DIAGNOSIS — I25.5 ISCHEMIC CARDIOMYOPATHY: ICD-10-CM

## 2022-10-14 DIAGNOSIS — I50.22 CHRONIC SYSTOLIC CONGESTIVE HEART FAILURE (HCC): ICD-10-CM

## 2022-10-14 DIAGNOSIS — R74.8 LIVER ENZYME ELEVATION: ICD-10-CM

## 2022-10-14 DIAGNOSIS — I10 ESSENTIAL HYPERTENSION: Chronic | ICD-10-CM

## 2022-10-14 DIAGNOSIS — R60.0 LEG EDEMA: ICD-10-CM

## 2022-10-14 DIAGNOSIS — I25.10 CORONARY ARTERY DISEASE INVOLVING NATIVE HEART WITHOUT ANGINA PECTORIS, UNSPECIFIED VESSEL OR LESION TYPE: ICD-10-CM

## 2022-10-14 PROCEDURE — 99214 OFFICE O/P EST MOD 30 MIN: CPT | Performed by: INTERNAL MEDICINE

## 2022-10-14 ASSESSMENT — FIBROSIS 4 INDEX: FIB4 SCORE: 1.135860278127803635

## 2022-10-14 ASSESSMENT — ENCOUNTER SYMPTOMS
WEAKNESS: 0
SPUTUM PRODUCTION: 0
PALPITATIONS: 0
GASTROINTESTINAL NEGATIVE: 1
LOSS OF CONSCIOUSNESS: 0
CLAUDICATION: 0
ORTHOPNEA: 0
CHILLS: 0
STRIDOR: 0
NEUROLOGICAL NEGATIVE: 1
COUGH: 0
HEMOPTYSIS: 0
WHEEZING: 0
BRUISES/BLEEDS EASILY: 0
CONSTITUTIONAL NEGATIVE: 1
SHORTNESS OF BREATH: 0
SORE THROAT: 0
DIZZINESS: 0
PND: 0
FEVER: 0
RESPIRATORY NEGATIVE: 1
CARDIOVASCULAR NEGATIVE: 1
EYES NEGATIVE: 1
MUSCULOSKELETAL NEGATIVE: 1

## 2022-10-14 NOTE — PROGRESS NOTES
Chief Complaint   Patient presents with    Dyslipidemia    Cardiomyopathy (Ischemic)    Congestive Heart Failure       Subjective     Pac Keith is a 66 y.o. male who presents today as a follow-up from a prior provider for history of type 2 diabetes hypertension hyperlipidemia and CAD status post stent.  His last echocardiogram point from 20% to 65%.  He is having no chest pain shortness of breath or PND.  Is been compliant with his medications.  His only issue has been his elevated blood sugars.  He is only on metformin and insulin.    Past Medical History:   Diagnosis Date    Diabetes (HCC)     DM type 2 (diabetes mellitus, type 2) (HCC) 2016    Essential hypertension 2016    GERD (gastroesophageal reflux disease) 2016    Hepatitis B 2016    Hyperlipidemia 2016    Vitamin D deficiency 2016     History reviewed. No pertinent surgical history.  Family History   Problem Relation Age of Onset    Heart Disease Neg Hx      Social History     Socioeconomic History    Marital status:      Spouse name: Not on file    Number of children: Not on file    Years of education: Not on file    Highest education level: Not on file   Occupational History    Not on file   Tobacco Use    Smoking status: Every Day     Packs/day: 0.50     Types: Cigarettes     Last attempt to quit: 12/3/2016     Years since quittin.0    Smokeless tobacco: Never    Tobacco comments:     10 CIGARETTES A DAY   Vaping Use    Vaping Use: Never used   Substance and Sexual Activity    Alcohol use: No     Alcohol/week: 0.0 oz    Drug use: No    Sexual activity: Not on file   Other Topics Concern    Not on file   Social History Narrative    Not on file     Social Determinants of Health     Financial Resource Strain: Not on file   Food Insecurity: Not on file   Transportation Needs: Not on file   Physical Activity: Not on file   Stress: Not on file   Social Connections: Not on file   Intimate Partner Violence: Not on file    Housing Stability: Not on file     No Known Allergies  Outpatient Encounter Medications as of 10/14/2022   Medication Sig Dispense Refill    insulin lispro (HUMALOG,ADMELOG) 100 UNIT/ML Solution Pen-injector injection PEN Inject 10 Units under the skin 3 times a day before meals. And adjust per sliding scale as provided in clinic. 10 mL 6    metFORMIN (GLUCOPHAGE) 500 MG Tab TAKE 1 TABLET BY MOUTH TWICE A DAY WITH MEALS 180 Tablet 2    Blood Glucose Monitoring Suppl (ONE TOUCH ULTRA 2) w/Device Kit CHECK BLOOD SUGAR 1 TIME A DAY AND AS NEEDED 1 Kit 0    ONETOUCH VERIO strip CHECK BLOOD SUGAR ONE-TWO TIME PER DAY AND AS NEEDED FOR HIGH OR LOW SUGAR 100 Strip 5    spironolactone (ALDACTONE) 25 MG Tab Take 1 Tablet by mouth every day. 90 Tablet 3    ferrous sulfate 325 (65 Fe) MG tablet Take 1 Tablet by mouth every day. 90 Tablet 3    aspirin 81 MG EC tablet Take 1 Tablet by mouth every day. 100 Tablet 4    clopidogrel (PLAVIX) 75 MG Tab Take 1 Tablet by mouth every day. 90 Tablet 3    metoprolol SR (TOPROL XL) 25 MG TABLET SR 24 HR Take 1 Tablet by mouth every day. 90 Tablet 3    rosuvastatin (CRESTOR) 40 MG tablet Take 1 Tablet by mouth every evening. 90 Tablet 3    Lancets Per insurance coverage. Check blood sugar 1x per day and prn ssx of high or low sugar 200 Each 6    Alcohol Swabs ([employer] PREP) 70 % Pads PER INSURANCE COVERAGE. WIPE SITE WITH PREP PAD PRIOR TO INJECTION 200 Each 5    losartan (COZAAR) 25 MG Tab Take 0.5 Tablets by mouth every day. 30 Tablet 6    [DISCONTINUED] insulin glargine (LANTUS SOLOSTAR) 100 UNIT/ML Solution Pen-injector injection Inject 15 Units under the skin every evening. (Patient taking differently: Inject 20 Units under the skin 2 times a day.) 5 Each 6    vitamin D (CHOLECALCIFEROL) 1000 UNIT Tab Take 2 Tabs by mouth every day. 30 Tab 3    [DISCONTINUED] furosemide (LASIX) 20 MG Tab Take 1 Tablet by mouth every day. With additional tablet in afternoon for weight gain greater than 3  lbs in 1 day or 5 lbs in 1 week (Patient taking differently: Take 40 mg by mouth every day. With additional tablet in afternoon for weight gain greater than 3 lbs in 1 day or 5 lbs in 1 week) 60 Tablet 3    [DISCONTINUED] senna-docusate (SENNA PLUS) 8.6-50 MG Tab Take 1 Tablet by mouth every day. (Patient not taking: Reported on 10/14/2022) 100 Tablet 1    [DISCONTINUED] magnesium hydroxide (MILK OF MAGNESIA) 400 MG/5ML Suspension Take 15 mL by mouth every day. (Patient not taking: Reported on 10/14/2022)       No facility-administered encounter medications on file as of 10/14/2022.     Review of Systems   Constitutional: Negative.  Negative for chills, fever and malaise/fatigue.   HENT: Negative.  Negative for sore throat.    Eyes: Negative.    Respiratory: Negative.  Negative for cough, hemoptysis, sputum production, shortness of breath, wheezing and stridor.    Cardiovascular: Negative.  Negative for chest pain, palpitations, orthopnea, claudication, leg swelling and PND.   Gastrointestinal: Negative.    Genitourinary: Negative.    Musculoskeletal: Negative.    Skin: Negative.    Neurological: Negative.  Negative for dizziness, loss of consciousness and weakness.   Endo/Heme/Allergies: Negative.  Does not bruise/bleed easily.   All other systems reviewed and are negative.           Objective     /78   Pulse 65   Resp 20   Wt 59.9 kg (132 lb)   SpO2 99%   BMI 24.14 kg/m²     Physical Exam  Vitals and nursing note reviewed.   Constitutional:       General: He is not in acute distress.     Appearance: He is well-developed. He is not diaphoretic.   HENT:      Head: Normocephalic and atraumatic.      Right Ear: External ear normal.      Left Ear: External ear normal.      Nose: Nose normal.      Mouth/Throat:      Pharynx: No oropharyngeal exudate.   Eyes:      General: No scleral icterus.        Right eye: No discharge.         Left eye: No discharge.      Conjunctiva/sclera: Conjunctivae normal.       Pupils: Pupils are equal, round, and reactive to light.   Neck:      Vascular: No JVD.   Cardiovascular:      Rate and Rhythm: Normal rate and regular rhythm.      Heart sounds: No murmur heard.    No friction rub. No gallop.   Pulmonary:      Effort: Pulmonary effort is normal. No respiratory distress.      Breath sounds: No stridor. No wheezing or rales.   Chest:      Chest wall: No tenderness.   Abdominal:      General: There is no distension.      Palpations: Abdomen is soft.      Tenderness: There is no guarding.   Musculoskeletal:         General: No tenderness or deformity. Normal range of motion.      Cervical back: Neck supple.   Skin:     General: Skin is warm and dry.      Coloration: Skin is not pale.      Findings: No erythema or rash.   Neurological:      Mental Status: He is alert.      Cranial Nerves: No cranial nerve deficit.      Motor: No abnormal muscle tone.      Coordination: Coordination normal.      Deep Tendon Reflexes: Reflexes are normal and symmetric. Reflexes normal.   Psychiatric:         Behavior: Behavior normal.         Thought Content: Thought content normal.         Judgment: Judgment normal.          Echocardiogram: Dated 4/3/2022 personally viewed inter by myself showing an EF of 20% no valvular heart disease.    Echocardiogram: Dated 10/11/2022 personally viewed inter myself showing normal LV systolic function no valvular heart disease.    Cardiac cath: Dated 2022 personally viewed inter myself showin. Successful staged PCI of the RCA, LAD using MARTA, IVUS guidance  2. Successful preservation of the D2 with POBA  3. Normal LVEDP    Assessment & Plan     1. Dyslipidemia        2. Essential hypertension        3. Proteinuria        4. Anemia, unspecified type        5. Tobacco dependence  Referral to Pharmacotherapy Service      6. Diabetic polyneuropathy associated with type 2 diabetes mellitus (HCC)  Referral to Pharmacotherapy Service      7. Liver enzyme elevation   Referral to Pharmacotherapy Service      8. Congestive heart failure (HCC)        9. Leg edema        10. Elevated troponin        11. Ischemic cardiomyopathy        12. Coronary artery disease involving native heart without angina pectoris, unspecified vessel or lesion type            Medical Decision Making: Today's Assessment/Status/Plan:        66-year-old male with diabetes hypertension hyperlipidemia CAD status post stent with heart failure likely from ischemia as well as diabetes.  His EF is normalized.  We will keep the same medications with changes.  I think he could use optimization of his diabetes.  I will perform to the pharmacotherapy program.  I will see him back in 6 months.  His stent was placed on 4/7/2022 and we will stop it after that visit.

## 2022-10-27 ENCOUNTER — TELEPHONE (OUTPATIENT)
Dept: CARDIOLOGY | Facility: MEDICAL CENTER | Age: 67
End: 2022-10-27
Payer: MEDICARE

## 2022-10-27 NOTE — TELEPHONE ENCOUNTER
10/27/2022 - VERNON informing Pt that the packet that was dropped off yesterday is now ready for P/U in [place]. Packet will be in a drawer until Pt comes to picks up. -JTH

## 2022-10-27 NOTE — TELEPHONE ENCOUNTER
Received signed paperwork from RO. Reviewed and completed. Given to Lore GIRALDO for scanning and notifying pt for .

## 2022-10-27 NOTE — TELEPHONE ENCOUNTER
Received notice from Lore GIRALDO yesterday afternoon. Pt's daughter had dropped off [facility] paperwork for review and completion. Pt had FV with RO 10/14/22. Scanned paperwork through [employer] and tagged provider portion pages. Given to RO for review and completion.

## 2022-10-31 ENCOUNTER — TELEPHONE (OUTPATIENT)
Dept: HEMATOLOGY ONCOLOGY | Facility: MEDICAL CENTER | Age: 67
End: 2022-10-31
Payer: MEDICARE

## 2022-10-31 NOTE — TELEPHONE ENCOUNTER
----- Message from Sally Swann Med Ass't sent at 10/20/2022  1:46 PM PDT -----  Regarding: FW: please change appointment  Just an FYI - I've attempted to call the daughter multiple times to get this patient back on the schedule with no luck!     Maybe you can try? I've already tried once today!    Thank you,  C.   ----- Message -----  From: Anisha Montez  Sent: 10/11/2022   4:34 PM PDT  To: Salyl Swann Med Ass't  Subject: please change appointment                        Daughter, Yen called - they got the labs drawn.  Please call her to reschedule the appointment from earlier this afternoon.

## 2022-11-04 ENCOUNTER — TELEPHONE (OUTPATIENT)
Dept: CARDIOLOGY | Facility: MEDICAL CENTER | Age: 67
End: 2022-11-04
Payer: MEDICARE

## 2022-11-04 NOTE — TELEPHONE ENCOUNTER
Received paperwork from Lore GIRALDO. Pt and spouse dropped off additional information and questions regarding paperwork, needs revision and completion by 11/15/22. Need to discuss further about what type of care is needed from pt's family member and if they are needing block/continuous or intermittent leave.    Called pt and left message to further discuss with pt's child Yen, confirmed contact. Will await call back.    Paperwork scanned through [employer].

## 2022-11-07 ENCOUNTER — NON-PROVIDER VISIT (OUTPATIENT)
Dept: VASCULAR LAB | Facility: MEDICAL CENTER | Age: 67
End: 2022-11-07
Attending: INTERNAL MEDICINE
Payer: MEDICARE

## 2022-11-07 DIAGNOSIS — E11.42 DIABETIC POLYNEUROPATHY ASSOCIATED WITH TYPE 2 DIABETES MELLITUS (HCC): ICD-10-CM

## 2022-11-07 PROCEDURE — 99213 OFFICE O/P EST LOW 20 MIN: CPT

## 2022-11-07 RX ORDER — METFORMIN HYDROCHLORIDE 500 MG/1
1000 TABLET, EXTENDED RELEASE ORAL 2 TIMES DAILY
Qty: 120 TABLET | Refills: 3 | Status: SHIPPED | OUTPATIENT
Start: 2022-11-07 | End: 2022-11-23

## 2022-11-07 RX ORDER — INSULIN GLARGINE 100 [IU]/ML
20 INJECTION, SOLUTION SUBCUTANEOUS EVERY EVENING
Qty: 6 ML | Refills: 3 | Status: SHIPPED | OUTPATIENT
Start: 2022-11-07 | End: 2023-02-21

## 2022-11-07 NOTE — PROGRESS NOTES
Patient Consult Note    Utilized translation services for this encounter.  Language Line - [phone]  [employer] ID - [id number]   Sharif Levine, ID # [id number]    Primary care physician: Mitesh Sorto M.D.    Reason for consult: Management of Uncontrolled Type 2 Diabetes    HPI:  Chucky Keith is a 66 y.o. old patient who comes in today for evaluation of above stated problem.    Most Recent HbA1c and POCT glucose:   Lab Results   Component Value Date/Time    HBA1C  10/03/2022 12:00 AM      Comment:      A1C too high            Most Recent Scr:  Lab Results   Component Value Date/Time    CREATININE 1.26 10/11/2022 03:56 PM    CREATININE 0.9 12/29/2005 04:08 PM        Current Diabetes Medication Regimen  Metformin: IR 500mg BID with meals         Basal Insulin: glargine pen 15 units qpm  Prandial Insulin: humalog pen 30 units qam    Previous Diabetes Medications and Reason for Discontinuation  N/A    Pt has home glucometer and proper testing technique - tests once a day in the morning before meals    Pt reports blood sugars:   Before Breakfast: 170-380, highs of 438, lows of 92 or 102      Hypoglycemia awareness - Educated patient signs and symptoms of low blood sugar, such as sweating, shakiness, hunger, and irritability.  Nocturnal hypoglycemia- None  Hypoglycemia:  None    Pt's treatment of Hypoglycemia - Stands still until shakiness goes away, then the tremors go away after 10 minutes. Likely to be neuropathy  - 15:15 Rule    Current Exercise - gym bike at home, every morning for 20 minutes  Exercise Goal - Continue exercising    Dietary - Patient eats one meal per day, which may consist of vegetables, meat, soup, or a half-bowl of rice. Rarely fruits.   Lunch - Noodles, bread, porridge  Dinner - rice, meat, and vegetables  Snacks - Biscuits  Drinks - water, coffee with sugar. No soda.    Preventative Management  BP regimen (ACE/ARB) - Losartan 12.5 mg every day  ASA - 81 mg   Statin - rosuvastatin 40 qpm  Last  Retinal Scan - 2022  Last Foot Exam - 2022  Last A1c -   Lab Results   Component Value Date/Time    HBA1C  10/03/2022 12:00 AM      Comment:      A1C too high      Last Microalbuminuria -     Latest Reference Range & Units 3/28/22 12:21   Micro Alb Creat Ratio 0 - 30 mg/g 267 (H)   (H): Data is abnormally high      Past Medical History:  Patient Active Problem List    Diagnosis Date Noted    Diabetic retinopathy associated with type 2 diabetes mellitus (HCC) 04/15/2022    CAD (coronary artery disease) 2022    Ischemic cardiomyopathy 2022    Congestive heart failure (HCC) 2022    Leg edema 2022    Right inguinal hernia 2022    Pelvic fluid collection 2022    Tobacco dependence 2022    Elevated troponin 2022    Liver enzyme elevation 2022    Proteinuria 2022    Anemia 2022    History of hepatitis B 2016    Vitamin D deficiency 2016    Dyslipidemia 2016    Diabetic polyneuropathy associated with type 2 diabetes mellitus (HCC) 2016    Essential hypertension 2016       Past Surgical History:  No past surgical history on file.    Allergies:  Patient has no known allergies.    Social History:  Social History     Socioeconomic History    Marital status:      Spouse name: Not on file    Number of children: Not on file    Years of education: Not on file    Highest education level: Not on file   Occupational History    Not on file   Tobacco Use    Smoking status: Every Day     Packs/day: 0.50     Types: Cigarettes     Last attempt to quit: 12/3/2016     Years since quittin.9    Smokeless tobacco: Never    Tobacco comments:     10 CIGARETTES A DAY   Vaping Use    Vaping Use: Never used   Substance and Sexual Activity    Alcohol use: No     Alcohol/week: 0.0 oz    Drug use: No    Sexual activity: Not on file   Other Topics Concern    Not on file   Social History Narrative    Not on file     Social Determinants of  Health     Financial Resource Strain: Not on file   Food Insecurity: Not on file   Transportation Needs: Not on file   Physical Activity: Not on file   Stress: Not on file   Social Connections: Not on file   Intimate Partner Violence: Not on file   Housing Stability: Not on file       Family History:  Family History   Problem Relation Age of Onset    Heart Disease Neg Hx        Medications:    Current Outpatient Medications:     insulin lispro (HUMALOG,ADMELOG) 100 UNIT/ML Solution Pen-injector injection PEN, Inject 10 Units under the skin 3 times a day before meals. And adjust per sliding scale as provided in clinic., Disp: 10 mL, Rfl: 6    metFORMIN (GLUCOPHAGE) 500 MG Tab, TAKE 1 TABLET BY MOUTH TWICE A DAY WITH MEALS, Disp: 180 Tablet, Rfl: 2    Blood Glucose Monitoring Suppl (ONE TOUCH ULTRA 2) w/Device Kit, CHECK BLOOD SUGAR 1 TIME A DAY AND AS NEEDED, Disp: 1 Kit, Rfl: 0    ONETOUCH VERIO strip, CHECK BLOOD SUGAR ONE-TWO TIME PER DAY AND AS NEEDED FOR HIGH OR LOW SUGAR, Disp: 100 Strip, Rfl: 5    spironolactone (ALDACTONE) 25 MG Tab, Take 1 Tablet by mouth every day., Disp: 90 Tablet, Rfl: 3    ferrous sulfate 325 (65 Fe) MG tablet, Take 1 Tablet by mouth every day., Disp: 90 Tablet, Rfl: 3    aspirin 81 MG EC tablet, Take 1 Tablet by mouth every day., Disp: 100 Tablet, Rfl: 4    clopidogrel (PLAVIX) 75 MG Tab, Take 1 Tablet by mouth every day., Disp: 90 Tablet, Rfl: 3    metoprolol SR (TOPROL XL) 25 MG TABLET SR 24 HR, Take 1 Tablet by mouth every day., Disp: 90 Tablet, Rfl: 3    rosuvastatin (CRESTOR) 40 MG tablet, Take 1 Tablet by mouth every evening., Disp: 90 Tablet, Rfl: 3    Lancets, Per insurance coverage. Check blood sugar 1x per day and prn ssx of high or low sugar, Disp: 200 Each, Rfl: 6    Alcohol Swabs ([employer]S PREP) 70 % Pads, PER INSURANCE COVERAGE. WIPE SITE WITH PREP PAD PRIOR TO INJECTION, Disp: 200 Each, Rfl: 5    losartan (COZAAR) 25 MG Tab, Take 0.5 Tablets by mouth every day., Disp: 30  Tablet, Rfl: 6    insulin glargine (LANTUS SOLOSTAR) 100 UNIT/ML Solution Pen-injector injection, Inject 15 Units under the skin every evening. (Patient taking differently: Inject 20 Units under the skin 2 times a day.), Disp: 5 Each, Rfl: 6    vitamin D (CHOLECALCIFEROL) 1000 UNIT Tab, Take 2 Tabs by mouth every day., Disp: 30 Tab, Rfl: 3    Labs: Reviewed    Physical Examination:  Vital signs: There were no vitals taken for this visit. There is no height or weight on file to calculate BMI.    Assessment and Plan:    1. DM2  Basic physiology of DMII was explained to patient as well as microvascular/macrovascular complications. The importance of increasing physical activity to improve diabetes control was discussed with the patient. Patient was also educated on changing diet and making better choices to help control blood sugar.   Discussed Goals: FBG 80 - 130, 2hPP < 180, a1c < 7.0%   Patient endorses taking metformin 500 bid, insulin glargine 15 mg every night, and humalog 30 units every morning.  Has been compliant with metformin for months, re-started insulin 2 weeks ago.  Encouraged patient to continue blood sugar log.  Pt endorses eating 1, sometimes 2 meals per day. Unfeasible to split insulin with second meal.    - Medication changes:  Increase metformin to 1000 mg bid  Increase glargine to 20 units at bedtime   Continue humalog 30 units every morning    - Lifestyle changes:  Continue exercise regimen    Follow Up:  4 weeks    Demond Koch PharmD    CC:   BENOIT King M.D. Jason Dent, WilliamD

## 2022-11-08 NOTE — TELEPHONE ENCOUNTER
Patient and family walked into office today. Handed paperwork to this RN with note on needed adjustments. This RN will complete needed adjustments and call Yen once paperwork completed to be picked back up per pt and family request.

## 2022-11-10 NOTE — TELEPHONE ENCOUNTER
Phone Number Called: [phone]    Call outcome: Left detailed message for patient. Informed to call back with any additional questions.    Message: Called to inform patient that paperwork is ready to .

## 2022-11-22 DIAGNOSIS — E11.42 DIABETIC POLYNEUROPATHY ASSOCIATED WITH TYPE 2 DIABETES MELLITUS (HCC): ICD-10-CM

## 2022-11-23 RX ORDER — METFORMIN HYDROCHLORIDE 500 MG/1
TABLET, EXTENDED RELEASE ORAL
Qty: 360 TABLET | Refills: 2 | Status: SHIPPED | OUTPATIENT
Start: 2022-11-23 | End: 2023-09-26 | Stop reason: SDUPTHER

## 2022-12-05 ENCOUNTER — NON-PROVIDER VISIT (OUTPATIENT)
Dept: VASCULAR LAB | Facility: MEDICAL CENTER | Age: 67
End: 2022-12-05
Attending: INTERNAL MEDICINE
Payer: MEDICARE

## 2022-12-05 PROCEDURE — 99213 OFFICE O/P EST LOW 20 MIN: CPT

## 2022-12-05 NOTE — PROGRESS NOTES
Patient Consult Note    Tal  [id number]    Primary care physician: Mitesh Sorto M.D.    Reason for consult: Management of Uncontrolled Type 2 Diabetes    HPI:  Chucky Keith is a 66 y.o. old patient who comes in today for evaluation of above stated problem.    Most Recent HbA1c and POCT glucose:   Lab Results   Component Value Date/Time    HBA1C  10/03/2022 12:00 AM      Comment:      A1C too high            Most Recent Scr:  Lab Results   Component Value Date/Time    CREATININE 1.26 10/11/2022 03:56 PM    CREATININE 0.9 12/29/2005 04:08 PM        Current Diabetes Medication Regimen  Metformin: IR/ mg BID   GLP-1 Agent: none   SGLT-2 Inhibitor: none   Sulfonylurea: none    Basal Insulin: Lantus 20 units (pt was taking in the morning)  Prandial Insulin: Pt was taking 30 units right before bedtime.   Mixed Insulin: none    Previous Diabetes Medications and Reason for Discontinuation  none    Pt has home glucometer and proper testing technique - Yes    Pt reports blood sugars:   Before Breakfast: 170's-250's    Hypoglycemia awareness - Yes,  took a long time explaining it for them.   Nocturnal hypoglycemia- not mentioned   Hypoglycemia:  None    Pt's treatment of Hypoglycemia - Provided this instruction at length today.   - 15:15 Rule    Current Exercise - neglected to discuss    Preventative Management  BP regimen (ACE/ARB) - Losartan 12.5 mg every day  ASA - 81 mg   Statin - rosuvastatin 40 qpm  Last Retinal Scan - 5/26/2022  Last Foot Exam - 2/17/2022  Last A1c -   Lab Results   Component Value Date/Time    HBA1C  10/03/2022 12:00 AM      Comment:      A1C too high      Last Microalbuminuria -     Latest Reference Range & Units 3/28/22 12:21   Micro Alb Creat Ratio 0 - 30 mg/g 267 (H)   (H): Data is abnormally high    Past Medical History:  Patient Active Problem List    Diagnosis Date Noted    Diabetic retinopathy associated with type 2 diabetes mellitus (HCC) 04/15/2022    CAD (coronary artery  disease) 2022    Ischemic cardiomyopathy 2022    Congestive heart failure (HCC) 2022    Leg edema 2022    Right inguinal hernia 2022    Pelvic fluid collection 2022    Tobacco dependence 2022    Elevated troponin 2022    Liver enzyme elevation 2022    Proteinuria 2022    Anemia 2022    History of hepatitis B 2016    Vitamin D deficiency 2016    Dyslipidemia 2016    Diabetic polyneuropathy associated with type 2 diabetes mellitus (HCC) 2016    Essential hypertension 2016       Past Surgical History:  No past surgical history on file.    Allergies:  Patient has no known allergies.    Social History:  Social History     Socioeconomic History    Marital status:      Spouse name: Not on file    Number of children: Not on file    Years of education: Not on file    Highest education level: Not on file   Occupational History    Not on file   Tobacco Use    Smoking status: Every Day     Packs/day: 0.50     Types: Cigarettes     Last attempt to quit: 12/3/2016     Years since quittin.0    Smokeless tobacco: Never    Tobacco comments:     10 CIGARETTES A DAY   Vaping Use    Vaping Use: Never used   Substance and Sexual Activity    Alcohol use: No     Alcohol/week: 0.0 oz    Drug use: No    Sexual activity: Not on file   Other Topics Concern    Not on file   Social History Narrative    Not on file     Social Determinants of Health     Financial Resource Strain: Not on file   Food Insecurity: Not on file   Transportation Needs: Not on file   Physical Activity: Not on file   Stress: Not on file   Social Connections: Not on file   Intimate Partner Violence: Not on file   Housing Stability: Not on file       Family History:  Family History   Problem Relation Age of Onset    Heart Disease Neg Hx        Medications:    Current Outpatient Medications:     metFORMIN ER (GLUCOPHAGE XR) 500 MG TABLET SR 24 HR, TAKE 2 TABLETS BY  MOUTH TWICE A DAY, Disp: 360 Tablet, Rfl: 2    insulin glargine (LANTUS SOLOSTAR) 100 UNIT/ML Solution Pen-injector injection, Inject 20 Units under the skin every evening., Disp: 6 mL, Rfl: 3    insulin lispro (HUMALOG,ADMELOG) 100 UNIT/ML Solution Pen-injector injection PEN, Inject 10 Units under the skin 3 times a day before meals. And adjust per sliding scale as provided in clinic., Disp: 10 mL, Rfl: 6    metFORMIN (GLUCOPHAGE) 500 MG Tab, TAKE 1 TABLET BY MOUTH TWICE A DAY WITH MEALS, Disp: 180 Tablet, Rfl: 2    Blood Glucose Monitoring Suppl (ONE TOUCH ULTRA 2) w/Device Kit, CHECK BLOOD SUGAR 1 TIME A DAY AND AS NEEDED, Disp: 1 Kit, Rfl: 0    ONETOUCH VERIO strip, CHECK BLOOD SUGAR ONE-TWO TIME PER DAY AND AS NEEDED FOR HIGH OR LOW SUGAR, Disp: 100 Strip, Rfl: 5    spironolactone (ALDACTONE) 25 MG Tab, Take 1 Tablet by mouth every day., Disp: 90 Tablet, Rfl: 3    ferrous sulfate 325 (65 Fe) MG tablet, Take 1 Tablet by mouth every day., Disp: 90 Tablet, Rfl: 3    aspirin 81 MG EC tablet, Take 1 Tablet by mouth every day., Disp: 100 Tablet, Rfl: 4    clopidogrel (PLAVIX) 75 MG Tab, Take 1 Tablet by mouth every day., Disp: 90 Tablet, Rfl: 3    metoprolol SR (TOPROL XL) 25 MG TABLET SR 24 HR, Take 1 Tablet by mouth every day., Disp: 90 Tablet, Rfl: 3    rosuvastatin (CRESTOR) 40 MG tablet, Take 1 Tablet by mouth every evening., Disp: 90 Tablet, Rfl: 3    Lancets, Per insurance coverage. Check blood sugar 1x per day and prn ssx of high or low sugar, Disp: 200 Each, Rfl: 6    Alcohol Swabs ([employer] PREP) 70 % Pads, PER INSURANCE COVERAGE. WIPE SITE WITH PREP PAD PRIOR TO INJECTION, Disp: 200 Each, Rfl: 5    losartan (COZAAR) 25 MG Tab, Take 0.5 Tablets by mouth every day., Disp: 30 Tablet, Rfl: 6    insulin glargine (LANTUS SOLOSTAR) 100 UNIT/ML Solution Pen-injector injection, Inject 15 Units under the skin every evening. (Patient taking differently: Inject 20 Units under the skin 2 times a day.), Disp: 5 Each, Rfl:  6    vitamin D (CHOLECALCIFEROL) 1000 UNIT Tab, Take 2 Tabs by mouth every day., Disp: 30 Tab, Rfl: 3    Labs: Reviewed    Physical Examination:  Vital signs: There were no vitals taken for this visit. There is no height or weight on file to calculate BMI.    Assessment and Plan:    1. DM2  Majority of the visit was used getting clarification with the .  Pt was taking humalog right before bedtime.  Basic physiology of DMII was explained to patient as well as microvascular/macrovascular complications. The importance of increasing physical activity to improve diabetes control was discussed with the patient. Patient was also educated on changing diet and making better choices to help control blood sugar.   Pt to change time of mealtime insulin to before his one meal of the day (Dinner)    - Medication changes:  Take humalog at dinner time, not bed time  Continue Lantus at night     - Lifestyle changes:  none    Follow Up:  2 weeks to address insulin.  Pt is very well educated how to treat hypoglycemia.     Stu Solorzano, PharmD    CC:   BENOIT King MD

## 2022-12-19 ENCOUNTER — NON-PROVIDER VISIT (OUTPATIENT)
Dept: VASCULAR LAB | Facility: MEDICAL CENTER | Age: 67
End: 2022-12-19
Attending: INTERNAL MEDICINE
Payer: MEDICARE

## 2022-12-19 DIAGNOSIS — Z23 NEED FOR VACCINATION: ICD-10-CM

## 2022-12-19 PROCEDURE — 99213 OFFICE O/P EST LOW 20 MIN: CPT

## 2022-12-19 PROCEDURE — 90471 IMMUNIZATION ADMIN: CPT

## 2022-12-19 NOTE — PROGRESS NOTES
Patient Consult Note    Primary care physician: Mitesh Sorto M.D.    Reason for consult: Management of Uncontrolled Type 2 Diabetes    HPI:  Chucky Keith is a 66 y.o. old patient who comes in today for evaluation of above stated problem.    Daughter Maria Del Carmen was translating.    Most Recent HbA1c and POCT glucose:   Lab Results   Component Value Date/Time    HBA1C  10/03/2022 12:00 AM      Comment:      A1C too high            Most Recent Scr:  Lab Results   Component Value Date/Time    CREATININE 1.26 10/11/2022 03:56 PM    CREATININE 0.9 12/29/2005 04:08 PM        Current Diabetes Medication Regimen  Metformin: IR/ER 1000 mg BID   GLP-1 Agent: none   SGLT-2 Inhibitor: none   Sulfonylurea: none     Basal Insulin: Lantus 20 units at night  Lantus Detemir 30 units in the morning  Prandial Insulin: Pt was taking 30 units right before bedtime.   Mixed Insulin: none      Previous Diabetes Medications and Reason for Discontinuation  none    Pt has home glucometer and proper testing technique - twice a day, morning and lunchtime. Documents blood sugar but left log at home.     Pt reports blood sugars:   Before Breakfast: 120s per pt  Before Lunch: 120s per pt    Hypoglycemia awareness - no  Nocturnal hypoglycemia- zeferino  Hypoglycemia:  None    Pt's treatment of Hypoglycemia - 15:15 Rule    Current Exercise - every morning, cycling at home, may walk a lap around neighborhood.  Exercise Goal - neglected to discuss    Dietary - Patient usually eats a snack for breakfast, 1 meal a day at dinner time  Drinks - coffee, hot water    Preventative Management  BP regimen (ACE/ARB) - Losartan 12.5 mg every day  ASA - 81 mg   Statin - rosuvastatin 40 qpm  Last Retinal Scan - 5/26/2022  Last Foot Exam - 2/17/2022  Last A1c -           Lab Results   Component Value Date/Time     HBA1C   10/03/2022 12:00 AM         Comment:         A1C too high      Last Microalbuminuria -     Latest Reference Range & Units 3/28/22 12:21   Micro Alb Creat Ratio 0 -  30 mg/g 267 (H)   (H): Data is abnormally high      Past Medical History:  Patient Active Problem List    Diagnosis Date Noted    Diabetic retinopathy associated with type 2 diabetes mellitus (HCC) 04/15/2022    CAD (coronary artery disease) 2022    Ischemic cardiomyopathy 2022    Congestive heart failure (HCC) 2022    Leg edema 2022    Right inguinal hernia 2022    Pelvic fluid collection 2022    Tobacco dependence 2022    Elevated troponin 2022    Liver enzyme elevation 2022    Proteinuria 2022    Anemia 2022    History of hepatitis B 2016    Vitamin D deficiency 2016    Dyslipidemia 2016    Diabetic polyneuropathy associated with type 2 diabetes mellitus (HCC) 2016    Essential hypertension 2016       Past Surgical History:  No past surgical history on file.    Allergies:  Patient has no known allergies.    Social History:  Social History     Socioeconomic History    Marital status:      Spouse name: Not on file    Number of children: Not on file    Years of education: Not on file    Highest education level: Not on file   Occupational History    Not on file   Tobacco Use    Smoking status: Every Day     Packs/day: 0.50     Types: Cigarettes     Last attempt to quit: 12/3/2016     Years since quittin.0    Smokeless tobacco: Never    Tobacco comments:     10 CIGARETTES A DAY   Vaping Use    Vaping Use: Never used   Substance and Sexual Activity    Alcohol use: No     Alcohol/week: 0.0 oz    Drug use: No    Sexual activity: Not on file   Other Topics Concern    Not on file   Social History Narrative    Not on file     Social Determinants of Health     Financial Resource Strain: Not on file   Food Insecurity: Not on file   Transportation Needs: Not on file   Physical Activity: Not on file   Stress: Not on file   Social Connections: Not on file   Intimate Partner Violence: Not on file   Housing Stability: Not on  file       Family History:  Family History   Problem Relation Age of Onset    Heart Disease Neg Hx        Medications:    Current Outpatient Medications:     metFORMIN ER (GLUCOPHAGE XR) 500 MG TABLET SR 24 HR, TAKE 2 TABLETS BY MOUTH TWICE A DAY, Disp: 360 Tablet, Rfl: 2    insulin glargine (LANTUS SOLOSTAR) 100 UNIT/ML Solution Pen-injector injection, Inject 20 Units under the skin every evening., Disp: 6 mL, Rfl: 3    insulin lispro (HUMALOG,ADMELOG) 100 UNIT/ML Solution Pen-injector injection PEN, Inject 10 Units under the skin 3 times a day before meals. And adjust per sliding scale as provided in clinic., Disp: 10 mL, Rfl: 6    metFORMIN (GLUCOPHAGE) 500 MG Tab, TAKE 1 TABLET BY MOUTH TWICE A DAY WITH MEALS, Disp: 180 Tablet, Rfl: 2    Blood Glucose Monitoring Suppl (ONE TOUCH ULTRA 2) w/Device Kit, CHECK BLOOD SUGAR 1 TIME A DAY AND AS NEEDED, Disp: 1 Kit, Rfl: 0    ONETOUCH VERIO strip, CHECK BLOOD SUGAR ONE-TWO TIME PER DAY AND AS NEEDED FOR HIGH OR LOW SUGAR, Disp: 100 Strip, Rfl: 5    spironolactone (ALDACTONE) 25 MG Tab, Take 1 Tablet by mouth every day., Disp: 90 Tablet, Rfl: 3    ferrous sulfate 325 (65 Fe) MG tablet, Take 1 Tablet by mouth every day., Disp: 90 Tablet, Rfl: 3    aspirin 81 MG EC tablet, Take 1 Tablet by mouth every day., Disp: 100 Tablet, Rfl: 4    clopidogrel (PLAVIX) 75 MG Tab, Take 1 Tablet by mouth every day., Disp: 90 Tablet, Rfl: 3    metoprolol SR (TOPROL XL) 25 MG TABLET SR 24 HR, Take 1 Tablet by mouth every day., Disp: 90 Tablet, Rfl: 3    rosuvastatin (CRESTOR) 40 MG tablet, Take 1 Tablet by mouth every evening., Disp: 90 Tablet, Rfl: 3    Lancets, Per insurance coverage. Check blood sugar 1x per day and prn ssx of high or low sugar, Disp: 200 Each, Rfl: 6    Alcohol Swabs ([employer] PREP) 70 % Pads, PER INSURANCE COVERAGE. WIPE SITE WITH PREP PAD PRIOR TO INJECTION, Disp: 200 Each, Rfl: 5    losartan (COZAAR) 25 MG Tab, Take 0.5 Tablets by mouth every day., Disp: 30 Tablet, Rfl:  6    insulin glargine (LANTUS SOLOSTAR) 100 UNIT/ML Solution Pen-injector injection, Inject 15 Units under the skin every evening. (Patient taking differently: Inject 20 Units under the skin 2 times a day.), Disp: 5 Each, Rfl: 6    vitamin D (CHOLECALCIFEROL) 1000 UNIT Tab, Take 2 Tabs by mouth every day., Disp: 30 Tab, Rfl: 3    Labs: Reviewed    Physical Examination:  Vital signs: There were no vitals taken for this visit. There is no height or weight on file to calculate BMI.    Assessment and Plan:    1. DM2  Basic physiology of DMII was explained to patient as well as microvascular/macrovascular complications. The importance of increasing physical activity to improve diabetes control was discussed with the patient. Patient was also educated on changing diet and making better choices to help control blood sugar.   Discussed Goals: FBG 80 - 130, 2hPP < 180, a1c < 7.0%   Pt brought in 30 units of detemir pen. Per pt they grabbed an old pen, have been taking 30 units of humalog every morning at home  Pt requested a flu shot today, they were not feeling ill or reported previous intolerance. Provided a high dose influenza vaccine in office today.  Pt has actually been taking metformin 500 mg BID, will plan to increase to 1000 mg BID.  Pt reported blood sugar levels are at goal, therefore would not increase insulin at this time.   Encouraged patient to bring blood glucose logs at next visit.    - Medication changes:  Increase metformin to 1000mg BID   Continue insulin glargine   Continue humalog    - Lifestyle changes:  Continue exercise    Follow Up:  4 weeks    Demond Koch, WilliamD    CC:   BENOIT King M.D.  Stu Solorzano, Sanya

## 2022-12-29 VITALS
HEART RATE: 65 BPM | BODY MASS INDEX: 24.14 KG/M2 | OXYGEN SATURATION: 99 % | RESPIRATION RATE: 20 BRPM | DIASTOLIC BLOOD PRESSURE: 78 MMHG | SYSTOLIC BLOOD PRESSURE: 139 MMHG | WEIGHT: 132 LBS

## 2023-01-16 ENCOUNTER — NON-PROVIDER VISIT (OUTPATIENT)
Dept: VASCULAR LAB | Facility: MEDICAL CENTER | Age: 68
End: 2023-01-16
Attending: INTERNAL MEDICINE
Payer: MEDICARE

## 2023-01-16 DIAGNOSIS — E11.42 DIABETIC POLYNEUROPATHY ASSOCIATED WITH TYPE 2 DIABETES MELLITUS (HCC): ICD-10-CM

## 2023-01-16 LAB
HBA1C MFR BLD: 10.2 % (ref 0–5.6)
INT CON NEG: ABNORMAL
INT CON POS: ABNORMAL

## 2023-01-16 PROCEDURE — 83036 HEMOGLOBIN GLYCOSYLATED A1C: CPT

## 2023-01-16 PROCEDURE — 99213 OFFICE O/P EST LOW 20 MIN: CPT

## 2023-01-16 RX ORDER — DAPAGLIFLOZIN 5 MG/1
5 TABLET, FILM COATED ORAL DAILY
Qty: 30 TABLET | Refills: 1 | Status: SHIPPED | OUTPATIENT
Start: 2023-01-16 | End: 2023-02-21

## 2023-01-16 NOTE — NON-PROVIDER
Patient Consult Note    Translation language = Cantonese  : Lucy [id number]  [facility] [id number]    Primary care physician: Pcp Pt States None    Reason for consult: Management of Uncontrolled Type 2 Diabetes    HPI:  Chucky Keith is a 67 y.o. old patient who comes in today for evaluation of above stated problem.    Most Recent HbA1c and POCT glucose:   Lab Results   Component Value Date/Time    HBA1C 10.2 (A) 01/16/2023 12:00 AM            Most Recent Scr:  Lab Results   Component Value Date/Time    CREATININE 1.26 10/11/2022 03:56 PM    CREATININE 0.9 12/29/2005 04:08 PM        Current Diabetes Medication Regimen  Metformin: ER 1000 mg BID      Basal Insulin: Lantus 30 units in the morning  Prandial Insulin: Humalog 20 units before lunch   Mixed Insulin: none    Previous Diabetes Medications and Reason for Discontinuation  In the morning before breakfast. Documents blood sugar but left log at home.    Pt has home glucometer and proper testing technique - yes    Pt reports blood sugars:   Before Breakfast: 120-280    Hypoglycemia awareness - no  Nocturnal hypoglycemia- no  Hypoglycemia:  None    Pt's treatment of Hypoglycemia - 15:15 Rule    Current Exercise - every morning, cycling at home, may walk a lap around neighborhood.  Exercise Goal - neglected to discuss    Dietary - minimal  Breakfast - snack such as bread, crackers, junk food  Dinner - rice, boodles, pasta, leafy greens, pepper  Drinks - water, coffee, tea      Preventative Management  BP regimen (ACE/ARB) - Losartan 12.5 mg every day  ASA - 81 mg   Statin - rosuvastatin 40 qpm  Last Retinal Scan - 5/26/2022  Last Foot Exam - 2/17/2022  Last A1c -   Lab Results   Component Value Date/Time    HBA1C 10.2 (A) 01/16/2023 12:00 AM      Last Microalbuminuria -     Latest Reference Range & Units 3/28/22 12:21   Micro Alb Creat Ratio 0 - 30 mg/g 267 (H)   (H): Data is abnormally high      Past Medical History:  Patient Active Problem List    Diagnosis Date  Noted    Diabetic retinopathy associated with type 2 diabetes mellitus (HCC) 04/15/2022    CAD (coronary artery disease) 2022    Ischemic cardiomyopathy 2022    Congestive heart failure (HCC) 2022    Leg edema 2022    Right inguinal hernia 2022    Pelvic fluid collection 2022    Tobacco dependence 2022    Elevated troponin 2022    Liver enzyme elevation 2022    Proteinuria 2022    Anemia 2022    History of hepatitis B 2016    Vitamin D deficiency 2016    Dyslipidemia 2016    Diabetic polyneuropathy associated with type 2 diabetes mellitus (HCC) 2016    Essential hypertension 2016       Past Surgical History:  No past surgical history on file.    Allergies:  Patient has no known allergies.    Social History:  Social History     Socioeconomic History    Marital status:      Spouse name: Not on file    Number of children: Not on file    Years of education: Not on file    Highest education level: Not on file   Occupational History    Not on file   Tobacco Use    Smoking status: Every Day     Packs/day: 0.50     Types: Cigarettes     Last attempt to quit: 12/3/2016     Years since quittin.1    Smokeless tobacco: Never    Tobacco comments:     10 CIGARETTES A DAY   Vaping Use    Vaping Use: Never used   Substance and Sexual Activity    Alcohol use: No     Alcohol/week: 0.0 oz    Drug use: No    Sexual activity: Not on file   Other Topics Concern    Not on file   Social History Narrative    Not on file     Social Determinants of Health     Financial Resource Strain: Not on file   Food Insecurity: Not on file   Transportation Needs: Not on file   Physical Activity: Not on file   Stress: Not on file   Social Connections: Not on file   Intimate Partner Violence: Not on file   Housing Stability: Not on file       Family History:  Family History   Problem Relation Age of Onset    Heart Disease Neg Hx         Medications:    Current Outpatient Medications:     dapagliflozin propanediol (FARXIGA) 5 MG Tab, Take 1 Tablet by mouth every day., Disp: 30 Tablet, Rfl: 1    metFORMIN ER (GLUCOPHAGE XR) 500 MG TABLET SR 24 HR, TAKE 2 TABLETS BY MOUTH TWICE A DAY, Disp: 360 Tablet, Rfl: 2    insulin glargine (LANTUS SOLOSTAR) 100 UNIT/ML Solution Pen-injector injection, Inject 20 Units under the skin every evening. (Patient taking differently: Inject 35 Units under the skin every morning.), Disp: 6 mL, Rfl: 3    insulin lispro (HUMALOG,ADMELOG) 100 UNIT/ML Solution Pen-injector injection PEN, Inject 10 Units under the skin 3 times a day before meals. And adjust per sliding scale as provided in clinic. (Patient taking differently: Inject 20 Units under the skin every morning before breakfast. And adjust per sliding scale as provided in clinic.), Disp: 10 mL, Rfl: 6    metFORMIN (GLUCOPHAGE) 500 MG Tab, TAKE 1 TABLET BY MOUTH TWICE A DAY WITH MEALS, Disp: 180 Tablet, Rfl: 2    Blood Glucose Monitoring Suppl (ONE TOUCH ULTRA 2) w/Device Kit, CHECK BLOOD SUGAR 1 TIME A DAY AND AS NEEDED, Disp: 1 Kit, Rfl: 0    ONETOUCH VERIO strip, CHECK BLOOD SUGAR ONE-TWO TIME PER DAY AND AS NEEDED FOR HIGH OR LOW SUGAR, Disp: 100 Strip, Rfl: 5    spironolactone (ALDACTONE) 25 MG Tab, Take 1 Tablet by mouth every day., Disp: 90 Tablet, Rfl: 3    ferrous sulfate 325 (65 Fe) MG tablet, Take 1 Tablet by mouth every day., Disp: 90 Tablet, Rfl: 3    aspirin 81 MG EC tablet, Take 1 Tablet by mouth every day., Disp: 100 Tablet, Rfl: 4    clopidogrel (PLAVIX) 75 MG Tab, Take 1 Tablet by mouth every day., Disp: 90 Tablet, Rfl: 3    metoprolol SR (TOPROL XL) 25 MG TABLET SR 24 HR, Take 1 Tablet by mouth every day., Disp: 90 Tablet, Rfl: 3    rosuvastatin (CRESTOR) 40 MG tablet, Take 1 Tablet by mouth every evening., Disp: 90 Tablet, Rfl: 3    Lancets, Per insurance coverage. Check blood sugar 1x per day and prn ssx of high or low sugar, Disp: 200 Each,  Rfl: 6    Alcohol Swabs ([employer] PREP) 70 % Pads, PER INSURANCE COVERAGE. WIPE SITE WITH PREP PAD PRIOR TO INJECTION, Disp: 200 Each, Rfl: 5    losartan (COZAAR) 25 MG Tab, Take 0.5 Tablets by mouth every day., Disp: 30 Tablet, Rfl: 6    vitamin D (CHOLECALCIFEROL) 1000 UNIT Tab, Take 2 Tabs by mouth every day., Disp: 30 Tab, Rfl: 3    Labs: Reviewed    Physical Examination:  Vital signs: There were no vitals taken for this visit. There is no height or weight on file to calculate BMI.    Assessment and Plan:    1. DM2  Basic physiology of DMII was explained to patient as well as microvascular/macrovascular complications. The importance of increasing physical activity to improve diabetes control was discussed with the patient. Patient was also educated on changing diet and making better choices to help control blood sugar.   Discussed Goals: FBG 80 - 130, 2hPP < 180, a1c < 7.0%   A1c is 10.2 taken today. Improved from last test (was too high to measure)  A1c above goal, increase regimen  Prefer to increase basal insulin at this time due to limited meals  Continue prandial insulin at lunchtime, pt does not endorse any hypoglycemia and understands to take this with meals.  Will aim to initiate an SGLT2-I to reduce insulin requirement and for chf benefit.  Continue to measure blood sugar once daily before meals, bring log to next visit.    - Medication changes:  Continue metformin 1000 mg BID  Increase Lantus to 30 units first thing in the morning  Continue Humalog 20 units before lunch  Begin farxiga 5 mg daily  Advised pt to stay hydrated to prevent dizziness    - Lifestyle changes:  Unable to discuss at this visit.    Follow Up:  1 month (CHF + DM) at [facility] clinic    Demond Koch, PharmD    CC:   Gary Mcfarlane M.D.  Stu Solorzano, WilliamD

## 2023-02-21 ENCOUNTER — NON-PROVIDER VISIT (OUTPATIENT)
Dept: CARDIOLOGY | Facility: MEDICAL CENTER | Age: 68
End: 2023-02-21
Payer: MEDICARE

## 2023-02-21 DIAGNOSIS — E11.42 DIABETIC POLYNEUROPATHY ASSOCIATED WITH TYPE 2 DIABETES MELLITUS (HCC): ICD-10-CM

## 2023-02-21 PROCEDURE — 99211 OFF/OP EST MAY X REQ PHY/QHP: CPT | Performed by: INTERNAL MEDICINE

## 2023-02-21 RX ORDER — PEN NEEDLE, DIABETIC 33 GX5/32"
NEEDLE, DISPOSABLE MISCELLANEOUS
Qty: 100 EACH | Refills: 99 | Status: SHIPPED | OUTPATIENT
Start: 2023-02-21 | End: 2023-09-26 | Stop reason: SDUPTHER

## 2023-02-21 RX ORDER — INSULIN GLARGINE 100 [IU]/ML
37 INJECTION, SOLUTION SUBCUTANEOUS EVERY EVENING
Qty: 12 ML | Refills: 6 | Status: SHIPPED | OUTPATIENT
Start: 2023-02-21 | End: 2023-09-26 | Stop reason: SDUPTHER

## 2023-02-21 NOTE — PROGRESS NOTES
Patient Consult Note     TIME IN: 340pm    TIME OUT: 425      Utilized translation services for this encounter.  Language Line - [phone]  [facility] ID - [id number]   [place], ID # [id number]      Primary care physician: Pcp Pt States None    Reason for consult: Management of Uncontrolled Type 2 Diabetes    HPI:  Pac Keith is a 67 y.o. old patient who comes in today for evaluation of above stated problem.    Allergies:  Patient has no known allergies.    Most Recent labs, A1c, and immunizations:    Lab Results   Component Value Date/Time    HBA1C 10.2 (A) 01/16/2023 12:00 AM          Lab Results   Component Value Date/Time    CHOLSTRLTOT 91 (L) 03/28/2022 12:21 PM    LDL 48 03/28/2022 12:21 PM    HDL 34 (A) 03/28/2022 12:21 PM    TRIGLYCERIDE 46 03/28/2022 12:21 PM       Lab Results   Component Value Date/Time    SODIUM 133 (L) 10/11/2022 03:56 PM    POTASSIUM 4.8 10/11/2022 03:56 PM    CHLORIDE 97 10/11/2022 03:56 PM    CO2 27 10/11/2022 03:56 PM    GLUCOSE 564 (HH) 10/11/2022 03:56 PM    BUN 13 10/11/2022 03:56 PM    CREATININE 1.26 10/11/2022 03:56 PM    CREATININE 0.9 12/29/2005 04:08 PM     Lab Results   Component Value Date/Time    ALKPHOSPHAT 80 09/27/2022 12:45 PM    ASTSGOT 14 09/27/2022 12:45 PM    ALTSGPT 14 09/27/2022 12:45 PM    TBILIRUBIN 0.2 09/27/2022 12:45 PM    INR 1.11 07/17/2017 09:31 AM    ALBUMIN 4.4 09/27/2022 12:45 PM      Lab Results   Component Value Date/Time    MALBCRT 267 (H) 03/28/2022 12:21 PM    MICROALBUR 18.0 03/28/2022 12:21 PM     Immunization History   Administered Date(s) Administered    Hepatitis B Vaccine (Adol/Adult) 03/13/2020    Influenza (IM) Preservative Free - HISTORICAL DATA 11/19/2015    Influenza Seasonal Injectable - Historical Data 10/08/2013, 04/09/2015    Influenza Vaccine Adult HD 08/21/2020, 12/19/2022    Influenza Vaccine Quad Inj (Pf) 10/12/2018, 10/04/2019    Influenza Vaccine Quad Inj (Preserved) 10/27/2016    PFIZER PURPLE CAP SARS-COV-2 VACCINATION  (12+) 05/05/2021, 05/26/2021    Pneumococcal polysaccharide vaccine (PPSV-23) 08/21/2020, 04/14/2022    Tdap Vaccine 08/21/2020    Zoster Vaccine Recombinant (RZV) (SHINGRIX) 08/21/2020, 01/26/2021           Physical Examination:  Vital signs: There were no vitals taken for this visit. There is no height or weight on file to calculate BMI.    Change in weight: Stable  Exercise habits: no regular exercise program   Diet: common adult    Medications:    Current Outpatient Medications:     dapagliflozin propanediol, 5 mg, Oral, DAILY    metFORMIN ER, TAKE 2 TABLETS BY MOUTH TWICE A DAY    Lantus SoloStar, 20 Units, Subcutaneous, Q EVENING (Patient taking differently: 35 Units, Subcutaneous, EVERY MORNING)    insulin lispro, 10 Units, Subcutaneous, TID AC (Patient taking differently: 20 Units, Subcutaneous, EVERY MORNING BEFORE BREAKFAST, And adjust per sliding scale as provided in clinic.)    metFORMIN, TAKE 1 TABLET BY MOUTH TWICE A DAY WITH MEALS    ONE TOUCH ULTRA 2, CHECK BLOOD SUGAR 1 TIME A DAY AND AS NEEDED    OneTouch Verio, CHECK BLOOD SUGAR ONE-TWO TIME PER DAY AND AS NEEDED FOR HIGH OR LOW SUGAR    spironolactone, 25 mg, Oral, DAILY    ferrous sulfate, 325 mg, Oral, DAILY    aspirin, 81 mg, Oral, DAILY    clopidogrel, 75 mg, Oral, DAILY    metoprolol SR, 25 mg, Oral, DAILY    rosuvastatin, 40 mg, Oral, Q EVENING    Lancets, Per insurance coverage. Check blood sugar 1x per day and prn ssx of high or low sugar    [employer] Prep, PER INSURANCE COVERAGE. WIPE SITE WITH PREP PAD PRIOR TO INJECTION    losartan, 12.5 mg, Oral, DAILY    vitamin D, 2,000 Units, Oral, DAILY      Assessment and Plan:  1. DM2   Most recent A1c is: above goal   -218 in the AM.   Cost of Farxiga is too much.   >30 min was spent on DM meds and insulin safety.   Kidney function:  46 ml/min creatinine clearance    Medication(s) recommended:   Continue metformin 1000 mg BID  Increase Lantus to 37 units HS  Continue Humalog 20 units before  lunch    Long-acting insulin:   Adjust dose according to FASTING BLOOD GLUCOSE target 100-130 mg/dL  (1) Increase the insulin dose every 3-4 days as needed.   (2) Increase by 2 units if FBG average concentration is 131-170 mg/dL.   (3) Increase by 4 units if FBG average concentration is 171-210 mg/dL.   (4) Increase by 6 units if FBG average concentration is 221-260 mg/dL.   (5) Increase by 8 units if FBG average concentration is greater than 261mg/dL and call us.   Consider cutting back by 1-2 units to previous dose if glucose concentration is below 100 mg/dL or symptoms of hypoglycemia.      (1) Rapid-acting insulin  Glucose reading  Change to be made to insulin dose    < 80 mg/dL  Subtract 1 unit     mg/dL  10 units.   (fixed or based on carbohydrate intake)    126-170 mg/dL  Add 1 unit    171-215 mg/dL  Add 2 units    216-260 mg/dL  Add 3 units       -Blood glucose and A1c target    However, more aggressive diabetic control is reasonable when tolerated.  Pt's treatment of Hypoglycemia. 15:15 Rule discussed with patient      2.  Cardiovascular  Is LDL <100: y  Is Blood pressure <130/80:  most office visits   Medication(s) recommended.   No change       3.  Lifestyle changes   Focus on eating a DASH/Mediterranean-style diet.   Exercise 30 minutes daily at least 5 days/week, as tolerated.  https://www.niddk.nih.gov/bwp          Follow-up appointment in 4 week(s)    Levy Scherer, PharmD, MS, BCACP, C  [facility]  Phone [phone] fax [phone]    This note was created using voice recognition software (Dragon). The accuracy of the dictation is limited by the abilities of the software. I have reviewed the note prior to signing, however some errors in grammar and context are still possible. If you have any questions related to this note please do not hesitate to contact our office.     CC:   Pcp Pt States None  No ref. provider found  Dr. Gary Mcfarlane

## 2023-03-21 ENCOUNTER — NON-PROVIDER VISIT (OUTPATIENT)
Dept: CARDIOLOGY | Facility: MEDICAL CENTER | Age: 68
End: 2023-03-21
Payer: MEDICARE

## 2023-03-21 DIAGNOSIS — E78.5 DYSLIPIDEMIA: ICD-10-CM

## 2023-03-21 DIAGNOSIS — E11.9 TYPE 2 DIABETES MELLITUS WITHOUT COMPLICATION, WITH LONG-TERM CURRENT USE OF INSULIN (HCC): ICD-10-CM

## 2023-03-21 DIAGNOSIS — Z79.4 TYPE 2 DIABETES MELLITUS WITHOUT COMPLICATION, WITH LONG-TERM CURRENT USE OF INSULIN (HCC): ICD-10-CM

## 2023-03-21 PROCEDURE — 99211 OFF/OP EST MAY X REQ PHY/QHP: CPT | Performed by: INTERNAL MEDICINE

## 2023-03-21 RX ORDER — FUROSEMIDE 20 MG/1
20 TABLET ORAL 2 TIMES DAILY
COMMUNITY
End: 2023-07-07

## 2023-03-21 NOTE — PROGRESS NOTES
Patient Consult Note     Utilized translation services for this encounter.  Language Line - [phone]  [facility] ID - [id number]    ID # [id number]    Primary care physician: Pcp Pt States None    Reason for consult: Management of Uncontrolled Type 2 Diabetes    HPI:  Pac Keith is a 67 y.o. old patient who comes in today for evaluation of above stated problem.    Allergies:  Patient has no known allergies.    Most Recent labs, A1c, and immunizations:    Lab Results   Component Value Date/Time    HBA1C 10.2 (A) 01/16/2023 12:00 AM          Lab Results   Component Value Date/Time    CHOLSTRLTOT 91 (L) 03/28/2022 12:21 PM    LDL 48 03/28/2022 12:21 PM    HDL 34 (A) 03/28/2022 12:21 PM    TRIGLYCERIDE 46 03/28/2022 12:21 PM       Lab Results   Component Value Date/Time    SODIUM 133 (L) 10/11/2022 03:56 PM    POTASSIUM 4.8 10/11/2022 03:56 PM    CHLORIDE 97 10/11/2022 03:56 PM    CO2 27 10/11/2022 03:56 PM    GLUCOSE 564 (HH) 10/11/2022 03:56 PM    BUN 13 10/11/2022 03:56 PM    CREATININE 1.26 10/11/2022 03:56 PM    CREATININE 0.9 12/29/2005 04:08 PM     Lab Results   Component Value Date/Time    ALKPHOSPHAT 80 09/27/2022 12:45 PM    ASTSGOT 14 09/27/2022 12:45 PM    ALTSGPT 14 09/27/2022 12:45 PM    TBILIRUBIN 0.2 09/27/2022 12:45 PM    INR 1.11 07/17/2017 09:31 AM    ALBUMIN 4.4 09/27/2022 12:45 PM      Lab Results   Component Value Date/Time    MALBCRT 267 (H) 03/28/2022 12:21 PM    MICROALBUR 18.0 03/28/2022 12:21 PM     Immunization History   Administered Date(s) Administered    Hepatitis B Vaccine (Adol/Adult) 03/13/2020    Influenza (IM) Preservative Free - HISTORICAL DATA 11/19/2015    Influenza Seasonal Injectable - Historical Data 10/08/2013, 04/09/2015    Influenza Vaccine Adult HD 08/21/2020, 12/19/2022    Influenza Vaccine Quad Inj (Pf) 10/12/2018, 10/04/2019    Influenza Vaccine Quad Inj (Preserved) 10/27/2016    PFIZER PURPLE CAP SARS-COV-2 VACCINATION (12+) 05/05/2021, 05/26/2021    Pneumococcal  polysaccharide vaccine (PPSV-23) 08/21/2020, 04/14/2022    Tdap Vaccine 08/21/2020    Zoster Vaccine Recombinant (RZV) (SHINGRIX) 08/21/2020, 01/26/2021           Physical Examination:  Vital signs: There were no vitals taken for this visit. There is no height or weight on file to calculate BMI.    Change in weight: Stable  Exercise habits: no regular exercise program   Diet: common adult    Medications:    Current Outpatient Medications:     Lantus SoloStar, 37 Units, Subcutaneous, Q EVENING    Pen Needles, Use with lantus and humalog, use at least 2 per day. May change per pt preference.    metFORMIN ER, TAKE 2 TABLETS BY MOUTH TWICE A DAY    insulin lispro, 10 Units, Subcutaneous, TID AC (Patient taking differently: 20 Units, Subcutaneous, EVERY MORNING BEFORE BREAKFAST, And adjust per sliding scale as provided in clinic.)    ONE TOUCH ULTRA 2, CHECK BLOOD SUGAR 1 TIME A DAY AND AS NEEDED    OneTouch Verio, CHECK BLOOD SUGAR ONE-TWO TIME PER DAY AND AS NEEDED FOR HIGH OR LOW SUGAR    spironolactone, 25 mg, Oral, DAILY    ferrous sulfate, 325 mg, Oral, DAILY    aspirin, 81 mg, Oral, DAILY    clopidogrel, 75 mg, Oral, DAILY    metoprolol SR, 25 mg, Oral, DAILY    rosuvastatin, 40 mg, Oral, Q EVENING    Lancets, Per insurance coverage. Check blood sugar 1x per day and prn ssx of high or low sugar    [employer] Prep, PER INSURANCE COVERAGE. WIPE SITE WITH PREP PAD PRIOR TO INJECTION    losartan, 12.5 mg, Oral, DAILY    vitamin D, 2,000 Units, Oral, DAILY      Assessment and Plan:  1. DM2   Most recent A1c is: above goal   BS  in the AM.   Some s/s of low BS after meals with Humalag, we will decrease the dose.   Cost of Farxiga is too much.   >30 min was spent on DM meds and insulin safety.   Kidney function:  46 ml/min creatinine clearance    Medication(s) recommended:   Continue metformin 1000 mg BID  Continue Lantus to 37 units HS  Decrease Humalog to 15 (or less) units before lunch/dinner.     Long-acting insulin:    Adjust dose according to FASTING BLOOD GLUCOSE target 100-130 mg/dL  (1) Increase the insulin dose every 3-4 days as needed.   (2) Increase by 2 units if FBG average concentration is 131-170 mg/dL.   (3) Increase by 4 units if FBG average concentration is 171-210 mg/dL.   (4) Increase by 6 units if FBG average concentration is 221-260 mg/dL.   (5) Increase by 8 units if FBG average concentration is greater than 261mg/dL and call us.   Consider cutting back by 1-2 units to previous dose if glucose concentration is below 100 mg/dL or symptoms of hypoglycemia.      (1) Rapid-acting insulin  Glucose reading  Change to be made to insulin dose    < 80 mg/dL  Subtract 1 unit     mg/dL  10 units.   (fixed or based on carbohydrate intake)    126-170 mg/dL  Add 1 unit    171-215 mg/dL  Add 2 units    216-260 mg/dL  Add 3 units       -Blood glucose and A1c target    However, more aggressive diabetic control is reasonable when tolerated.  Pt's treatment of Hypoglycemia. 15:15 Rule discussed with patient      2.  Cardiovascular  Is LDL <100: y  Is Blood pressure <130/80:  most office visits   Medication(s) recommended.   No change       3.  Lifestyle changes   Focus on eating a DASH/Mediterranean-style diet.   Exercise 30 minutes daily at least 5 days/week, as tolerated.  https://www.niddk.nih.gov/bwp          Follow-up appointment in 6 week(s)    Levy Scherer, PharmD, MS, BCACP, LCC  [facility]  Phone [phone] fax [phone]    This note was created using voice recognition software (Dragon). The accuracy of the dictation is limited by the abilities of the software. I have reviewed the note prior to signing, however some errors in grammar and context are still possible. If you have any questions related to this note please do not hesitate to contact our office.     CC:   Pcp Pt States None  No ref. provider found  Dr. Gary Mcfarlnae

## 2023-05-01 ENCOUNTER — HOSPITAL ENCOUNTER (OUTPATIENT)
Dept: LAB | Facility: MEDICAL CENTER | Age: 68
End: 2023-05-01
Attending: NURSE PRACTITIONER
Payer: MEDICARE

## 2023-05-01 DIAGNOSIS — E78.5 DYSLIPIDEMIA: ICD-10-CM

## 2023-05-01 DIAGNOSIS — Z79.4 TYPE 2 DIABETES MELLITUS WITHOUT COMPLICATION, WITH LONG-TERM CURRENT USE OF INSULIN (HCC): ICD-10-CM

## 2023-05-01 DIAGNOSIS — E11.9 TYPE 2 DIABETES MELLITUS WITHOUT COMPLICATION, WITH LONG-TERM CURRENT USE OF INSULIN (HCC): ICD-10-CM

## 2023-05-01 LAB
ALBUMIN SERPL BCP-MCNC: 4.4 G/DL (ref 3.2–4.9)
ALBUMIN/GLOB SERPL: 1.4 G/DL
ALP SERPL-CCNC: 46 U/L (ref 30–99)
ALT SERPL-CCNC: 14 U/L (ref 2–50)
ANION GAP SERPL CALC-SCNC: 12 MMOL/L (ref 7–16)
AST SERPL-CCNC: 16 U/L (ref 12–45)
BILIRUB SERPL-MCNC: 0.3 MG/DL (ref 0.1–1.5)
BUN SERPL-MCNC: 18 MG/DL (ref 8–22)
CALCIUM ALBUM COR SERPL-MCNC: 9.5 MG/DL (ref 8.5–10.5)
CALCIUM SERPL-MCNC: 9.8 MG/DL (ref 8.5–10.5)
CHLORIDE SERPL-SCNC: 102 MMOL/L (ref 96–112)
CHOLEST SERPL-MCNC: 110 MG/DL (ref 100–199)
CO2 SERPL-SCNC: 25 MMOL/L (ref 20–33)
CREAT SERPL-MCNC: 0.98 MG/DL (ref 0.5–1.4)
EST. AVERAGE GLUCOSE BLD GHB EST-MCNC: 183 MG/DL
FASTING STATUS PATIENT QL REPORTED: NORMAL
GFR SERPLBLD CREATININE-BSD FMLA CKD-EPI: 83 ML/MIN/1.73 M 2
GLOBULIN SER CALC-MCNC: 3.2 G/DL (ref 1.9–3.5)
GLUCOSE SERPL-MCNC: 88 MG/DL (ref 65–99)
HBA1C MFR BLD: 8 % (ref 4–5.6)
HDLC SERPL-MCNC: 41 MG/DL
LDLC SERPL CALC-MCNC: 45 MG/DL
POTASSIUM SERPL-SCNC: 4.3 MMOL/L (ref 3.6–5.5)
PROT SERPL-MCNC: 7.6 G/DL (ref 6–8.2)
SODIUM SERPL-SCNC: 139 MMOL/L (ref 135–145)
TRIGL SERPL-MCNC: 119 MG/DL (ref 0–149)

## 2023-05-01 PROCEDURE — 36415 COLL VENOUS BLD VENIPUNCTURE: CPT

## 2023-05-01 PROCEDURE — 80053 COMPREHEN METABOLIC PANEL: CPT

## 2023-05-01 PROCEDURE — 80061 LIPID PANEL: CPT

## 2023-05-01 PROCEDURE — 83036 HEMOGLOBIN GLYCOSYLATED A1C: CPT | Mod: GA

## 2023-05-04 DIAGNOSIS — I50.21 ACUTE SYSTOLIC HEART FAILURE (HCC): ICD-10-CM

## 2023-05-04 RX ORDER — ROSUVASTATIN CALCIUM 40 MG/1
TABLET, COATED ORAL
Qty: 90 TABLET | Refills: 1 | Status: SHIPPED | OUTPATIENT
Start: 2023-05-04 | End: 2023-09-26 | Stop reason: SDUPTHER

## 2023-05-04 RX ORDER — METOPROLOL SUCCINATE 25 MG/1
25 TABLET, EXTENDED RELEASE ORAL DAILY
Qty: 90 TABLET | Refills: 1 | Status: SHIPPED | OUTPATIENT
Start: 2023-05-04 | End: 2023-09-26 | Stop reason: SDUPTHER

## 2023-05-04 RX ORDER — CLOPIDOGREL BISULFATE 75 MG/1
75 TABLET ORAL DAILY
Qty: 90 TABLET | Refills: 1 | Status: SHIPPED | OUTPATIENT
Start: 2023-05-04 | End: 2023-11-14

## 2023-05-04 RX ORDER — LOSARTAN POTASSIUM 25 MG/1
TABLET ORAL
Qty: 45 TABLET | Refills: 0 | Status: SHIPPED | OUTPATIENT
Start: 2023-05-04 | End: 2023-07-31

## 2023-05-04 NOTE — TELEPHONE ENCOUNTER
Received request via: Pharmacy    Was the patient seen in the last year in this department? No 04/14/2022    Does the patient have an active prescription (recently filled or refills available) for medication(s) requested? No    Does the patient have alf Plus and need 100 day supply (blood pressure, diabetes and cholesterol meds only)? Patient does not have SCP

## 2023-05-04 NOTE — TELEPHONE ENCOUNTER
Is the patient due for a refill? Yes    Was the patient seen the past year? Yes    Date of last office visit: 10/14/2022    Does the patient have an upcoming appointment?  No   If yes, When?     Provider to refill:ANDREWS, ADD for RO    Does the patients insurance require a 100 day supply?  No

## 2023-05-09 ENCOUNTER — NON-PROVIDER VISIT (OUTPATIENT)
Dept: CARDIOLOGY | Facility: MEDICAL CENTER | Age: 68
End: 2023-05-09
Payer: MEDICARE

## 2023-05-09 VITALS
DIASTOLIC BLOOD PRESSURE: 63 MMHG | BODY MASS INDEX: 24.69 KG/M2 | WEIGHT: 135 LBS | SYSTOLIC BLOOD PRESSURE: 123 MMHG | HEART RATE: 85 BPM

## 2023-05-09 PROCEDURE — 99211 OFF/OP EST MAY X REQ PHY/QHP: CPT | Performed by: INTERNAL MEDICINE

## 2023-05-09 ASSESSMENT — FIBROSIS 4 INDEX: FIB4 SCORE: 1.31721612085409161

## 2023-05-09 NOTE — PROGRESS NOTES
Patient Consult Note     Utilized translation services for this encounter.  Language Line - [phone]  [facility] ID - [id number]    ID # [id number]    Primary care physician: Pcp Pt States None    Reason for consult: Management of Uncontrolled Type 2 Diabetes    HPI:  Pac Keith is a 67 y.o. old patient who comes in today for evaluation of above stated problem.    Allergies:  Patient has no known allergies.    Most Recent labs, A1c, and immunizations:    Lab Results   Component Value Date/Time    HBA1C 8.0 (H) 05/01/2023 11:48 AM          Lab Results   Component Value Date/Time    CHOLSTRLTOT 110 05/01/2023 11:48 AM    LDL 45 05/01/2023 11:48 AM    HDL 41 05/01/2023 11:48 AM    TRIGLYCERIDE 119 05/01/2023 11:48 AM       Lab Results   Component Value Date/Time    SODIUM 139 05/01/2023 11:48 AM    POTASSIUM 4.3 05/01/2023 11:48 AM    CHLORIDE 102 05/01/2023 11:48 AM    CO2 25 05/01/2023 11:48 AM    GLUCOSE 88 05/01/2023 11:48 AM    BUN 18 05/01/2023 11:48 AM    CREATININE 0.98 05/01/2023 11:48 AM    CREATININE 0.9 12/29/2005 04:08 PM     Lab Results   Component Value Date/Time    ALKPHOSPHAT 46 05/01/2023 11:48 AM    ASTSGOT 16 05/01/2023 11:48 AM    ALTSGPT 14 05/01/2023 11:48 AM    TBILIRUBIN 0.3 05/01/2023 11:48 AM    INR 1.11 07/17/2017 09:31 AM    ALBUMIN 4.4 05/01/2023 11:48 AM      Lab Results   Component Value Date/Time    MALBCRT 267 (H) 03/28/2022 12:21 PM    MICROALBUR 18.0 03/28/2022 12:21 PM     Immunization History   Administered Date(s) Administered    Hepatitis B Vaccine (Adol/Adult) 03/13/2020    Influenza (IM) Preservative Free - HISTORICAL DATA 11/19/2015    Influenza Seasonal Injectable - Historical Data 10/08/2013, 04/09/2015    Influenza Vaccine Adult HD 08/21/2020, 12/19/2022    Influenza Vaccine Quad Inj (Pf) 10/12/2018, 10/04/2019    Influenza Vaccine Quad Inj (Preserved) 10/27/2016    PFIZER PURPLE CAP SARS-COV-2 VACCINATION (12+) 05/05/2021, 05/26/2021    Pneumococcal polysaccharide  vaccine (PPSV-23) 08/21/2020, 04/14/2022    Tdap Vaccine 08/21/2020    Zoster Vaccine Recombinant (RZV) (SHINGRIX) 08/21/2020, 01/26/2021           Physical Examination:  Vital signs: /63   Pulse 85   Wt 61.2 kg (135 lb)   BMI 24.69 kg/m²  Body mass index is 24.69 kg/m².    Change in weight: Stable  Exercise habits: no regular exercise program   Diet: common adult    Medications:    Current Outpatient Medications:     losartan, TAKE 1/2 TABLET BY MOUTH EVERY DAY    clopidogrel, 75 mg, Oral, DAILY    rosuvastatin, TAKE 1 TABLET BY MOUTH EVERY DAY IN THE EVENING    metoprolol SR, 25 mg, Oral, DAILY    furosemide, 20 mg, Oral, BID    Lantus SoloStar, 37 Units, Subcutaneous, Q EVENING    Pen Needles, Use with lantus and humalog, use at least 2 per day. May change per pt preference.    metFORMIN ER, TAKE 2 TABLETS BY MOUTH TWICE A DAY    insulin lispro, 10 Units, Subcutaneous, TID AC (Patient taking differently: 20 Units, Subcutaneous, EVERY MORNING BEFORE BREAKFAST, And adjust per sliding scale as provided in clinic.)    ONE TOUCH ULTRA 2, CHECK BLOOD SUGAR 1 TIME A DAY AND AS NEEDED    OneTouch Verio, CHECK BLOOD SUGAR ONE-TWO TIME PER DAY AND AS NEEDED FOR HIGH OR LOW SUGAR    spironolactone, 25 mg, Oral, DAILY    ferrous sulfate, 325 mg, Oral, DAILY    aspirin, 81 mg, Oral, DAILY    Lancets, Per insurance coverage. Check blood sugar 1x per day and prn ssx of high or low sugar    [employer] Prep, PER INSURANCE COVERAGE. WIPE SITE WITH PREP PAD PRIOR TO INJECTION    vitamin D, 2,000 Units, Oral, DAILY      Assessment and Plan:  1. DM2   Most recent A1c is: above goal, but much better   BS  in the AM.   He presented with his daughter in clinic today, she will help him understand how to titrate his insulin more effectively  Cost of Farxiga is too much.   They declined Januvia, or similar drug due to cost  Kidney function:   Latest Reference Range & Units 09/27/22 12:45 10/11/22 15:56 05/01/23 11:48   GFR  (CKD-EPI) >60 mL/min/1.73 m 2 51 ! 62 83   !: Data is abnormal    Medication(s) recommended:   Continue metformin 1000 mg twice daily   Continue Lantus to 37 -39 units at night ( no more than 50 units)  Continue Humalog to 15 units before lunch/dinner.     Long-acting insulin:   Adjust dose according to FASTING BLOOD GLUCOSE target 100-130 mg/dL  (1) Increase the insulin dose every 3-4 days as needed.   (2) Increase by 1 units if FBG average concentration is 131-170 mg/dL.   (3) Increase by 2 units if FBG average concentration is 171-210 mg/dL.   (4) Increase by 4 units if FBG average concentration is 221-260 mg/dL.   (5) Increase by 6 units if FBG average concentration is greater than 261mg/dL and call us.   Consider cutting back by 1-2 units to previous dose if glucose concentration is below 100 mg/dL or symptoms of hypoglycemia.      (1) Rapid-acting insulin  Glucose reading  Change to be made to insulin dose    < 80 mg/dL  Subtract 1 unit    100-130 mg/dL  15 units.   (fixed or based on carbohydrate intake)    130-170 mg/dL  Add 1 unit    171-215 mg/dL  Add 2 units    216-260 mg/dL  Add 3 units       -Blood glucose and A1c target    However, more aggressive diabetic control is reasonable when tolerated.  Pt's treatment of Hypoglycemia. 15:15 Rule discussed with patient      2.  Cardiovascular  Is LDL <100: y  Is Blood pressure <130/80:  most office visits   Medication(s) recommended.   No change to medications today    3.  Lifestyle changes   Focus on eating a DASH/Mediterranean-style diet.   Exercise 30 minutes daily at least 5 days/week, as tolerated.  https://www.niddk.nih.gov/bwp          Follow-up appointment in 6 week(s)    Levy Scherer, PharmD, MS, BCACP, C  [facility]  Phone [phone] fax [phone]    This note was created using voice recognition software (Dragon). The accuracy of the dictation is limited by the abilities of the software. I have reviewed the  note prior to signing, however some errors in grammar and context are still possible. If you have any questions related to this note please do not hesitate to contact our office.     CC:   Pcp Pt States None  No ref. provider found  Dr. Gary Mcfarlane

## 2023-05-09 NOTE — PATIENT INSTRUCTIONS
Continue metformin 1000 mg twice daily   Continue Lantus to 37 -39 units at night ( no more than 50 units)  Decrease Humalog to 15 (or less) units before lunch/dinner.     Long-acting insulin:   Adjust dose according to FASTING BLOOD GLUCOSE target 100-130 mg/dL  (1) Increase the insulin dose every 3-4 days as needed.   (2) Increase by 1 units if FBG average concentration is 131-170 mg/dL.   (3) Increase by 2 units if FBG average concentration is 171-210 mg/dL.   (4) Increase by 4 units if FBG average concentration is 221-260 mg/dL.   (5) Increase by 6 units if FBG average concentration is greater than 261mg/dL and call us.   Consider cutting back by 1-2 units to previous dose if glucose concentration is below 100 mg/dL or symptoms of hypoglycemia.      (1) Rapid-acting insulin  Glucose reading  Change to be made to insulin dose    < 80 mg/dL  Subtract 1 unit    100-130 mg/dL  15 units.   (fixed or based on carbohydrate intake)    130-170 mg/dL  Add 1 unit    171-215 mg/dL  Add 2 units    216-260 mg/dL  Add 3 units       -Blood glucose and A1c target

## 2023-06-22 DIAGNOSIS — I50.21 ACUTE SYSTOLIC HEART FAILURE (HCC): ICD-10-CM

## 2023-06-22 RX ORDER — ASPIRIN 81 MG/1
TABLET, COATED ORAL
Qty: 100 TABLET | Refills: 3 | Status: SHIPPED | OUTPATIENT
Start: 2023-06-22

## 2023-06-22 NOTE — TELEPHONE ENCOUNTER
Is the patient due for a refill? Yes    Was the patient seen the past year? Yes    Date of last office visit: 10/14/2022    Does the patient have an upcoming appointment?  No   If yes, When?     Provider to refill:DORI, ADD for RO    Does the patients insurance require a 100 day supply?  No

## 2023-06-27 ENCOUNTER — NON-PROVIDER VISIT (OUTPATIENT)
Dept: CARDIOLOGY | Facility: MEDICAL CENTER | Age: 68
End: 2023-06-27
Attending: INTERNAL MEDICINE
Payer: MEDICARE

## 2023-06-27 VITALS
BODY MASS INDEX: 27.25 KG/M2 | SYSTOLIC BLOOD PRESSURE: 113 MMHG | WEIGHT: 149 LBS | HEART RATE: 76 BPM | DIASTOLIC BLOOD PRESSURE: 49 MMHG

## 2023-06-27 DIAGNOSIS — E11.9 TYPE 2 DIABETES MELLITUS WITHOUT COMPLICATION, WITH LONG-TERM CURRENT USE OF INSULIN (HCC): ICD-10-CM

## 2023-06-27 DIAGNOSIS — Z79.4 TYPE 2 DIABETES MELLITUS WITHOUT COMPLICATION, WITH LONG-TERM CURRENT USE OF INSULIN (HCC): ICD-10-CM

## 2023-06-27 ASSESSMENT — FIBROSIS 4 INDEX: FIB4 SCORE: 1.31721612085409161

## 2023-06-27 NOTE — PROGRESS NOTES
Patient Consult Note     Utilized translation services for this encounter.  Language Line - [phone]  [facility] ID - [id number]    ID # [id number]    Primary care physician: Pcp Pt States None    Reason for consult: Management of Uncontrolled Type 2 Diabetes    HPI:  Pac Keith is a 67 y.o. old patient who comes in today for evaluation of above stated problem.    Allergies:  Patient has no known allergies.    Most Recent labs, A1c, and immunizations:    Lab Results   Component Value Date/Time    HBA1C 8.0 (H) 05/01/2023 11:48 AM          Lab Results   Component Value Date/Time    CHOLSTRLTOT 110 05/01/2023 11:48 AM    LDL 45 05/01/2023 11:48 AM    HDL 41 05/01/2023 11:48 AM    TRIGLYCERIDE 119 05/01/2023 11:48 AM       Lab Results   Component Value Date/Time    SODIUM 139 05/01/2023 11:48 AM    POTASSIUM 4.3 05/01/2023 11:48 AM    CHLORIDE 102 05/01/2023 11:48 AM    CO2 25 05/01/2023 11:48 AM    GLUCOSE 88 05/01/2023 11:48 AM    BUN 18 05/01/2023 11:48 AM    CREATININE 0.98 05/01/2023 11:48 AM    CREATININE 0.9 12/29/2005 04:08 PM     Lab Results   Component Value Date/Time    ALKPHOSPHAT 46 05/01/2023 11:48 AM    ASTSGOT 16 05/01/2023 11:48 AM    ALTSGPT 14 05/01/2023 11:48 AM    TBILIRUBIN 0.3 05/01/2023 11:48 AM    INR 1.11 07/17/2017 09:31 AM    ALBUMIN 4.4 05/01/2023 11:48 AM      Lab Results   Component Value Date/Time    MALBCRT 267 (H) 03/28/2022 12:21 PM    MICROALBUR 18.0 03/28/2022 12:21 PM     Immunization History   Administered Date(s) Administered    Hepatitis B Vaccine (Adol/Adult) 03/13/2020    Influenza (IM) Preservative Free - HISTORICAL DATA 11/19/2015    Influenza Seasonal Injectable - Historical Data 10/08/2013, 04/09/2015    Influenza Vaccine Adult HD 08/21/2020, 12/19/2022    Influenza Vaccine Quad Inj (Pf) 10/12/2018, 10/04/2019    Influenza Vaccine Quad Inj (Preserved) 10/27/2016    PFIZER PURPLE CAP SARS-COV-2 VACCINATION (12+) 05/05/2021, 05/26/2021    Pneumococcal polysaccharide  vaccine (PPSV-23) 08/21/2020, 04/14/2022    Tdap Vaccine 08/21/2020    Zoster Vaccine Recombinant (RZV) (SHINGRIX) 08/21/2020, 01/26/2021           Physical Examination:  Vital signs: /49   Pulse 76   Wt 67.6 kg (149 lb)   BMI 27.25 kg/m²  Body mass index is 27.25 kg/m².    Change in weight: Stable  Exercise habits: no regular exercise program   Diet: common adult    Medications:    Current Outpatient Medications:     losartan, TAKE 1/2 TABLET BY MOUTH EVERY DAY    clopidogrel, 75 mg, Oral, DAILY    rosuvastatin, TAKE 1 TABLET BY MOUTH EVERY DAY IN THE EVENING    metoprolol SR, 25 mg, Oral, DAILY    Aspirin Low Dose, TAKE 1 TABLET BY MOUTH EVERY DAY    furosemide, 20 mg, Oral, BID    Lantus SoloStar, 37 Units, Subcutaneous, Q EVENING (Patient taking differently: 35 Units, Subcutaneous, EVERY EVENING, This rx was submitted by a pharmacist working under a collaborative practice agreement.)    Pen Needles, Use with lantus and humalog, use at least 2 per day. May change per pt preference.    metFORMIN ER, TAKE 2 TABLETS BY MOUTH TWICE A DAY    insulin lispro, 10 Units, Subcutaneous, TID AC (Patient taking differently: 20 Units, Subcutaneous, EVERY MORNING BEFORE BREAKFAST, And adjust per sliding scale as provided in clinic.)    ONE TOUCH ULTRA 2, CHECK BLOOD SUGAR 1 TIME A DAY AND AS NEEDED    OneTouch Verio, CHECK BLOOD SUGAR ONE-TWO TIME PER DAY AND AS NEEDED FOR HIGH OR LOW SUGAR    spironolactone, 25 mg, Oral, DAILY    ferrous sulfate, 325 mg, Oral, DAILY    Lancets, Per insurance coverage. Check blood sugar 1x per day and prn ssx of high or low sugar    [facility] Prep, PER INSURANCE COVERAGE. WIPE SITE WITH PREP PAD PRIOR TO INJECTION    vitamin D, 2,000 Units, Oral, DAILY      Assessment and Plan:  1. DM2   Most recent A1c is: above goal, but much better   BS  in the AM, some symptoms of hypoglycemia   He presented with his daughter in clinic today, she will help him understand how to titrate his insulin  more effectively  Cost of Farxiga is too much.   They declined Januvia, or similar drug due to cost  Kidney function:   Latest Reference Range & Units 09/27/22 12:45 10/11/22 15:56 05/01/23 11:48   GFR (CKD-EPI) >60 mL/min/1.73 m 2 51 ! 62 83   !: Data is abnormal    Medication(s) recommended:   Continue metformin 1000 mg twice daily   Decrease  Lantus to 35 units at night ( no more than 50 units)  Continue Humalog to 15 units before lunch/dinner.     Long-acting insulin:   Adjust dose according to FASTING BLOOD GLUCOSE target 100-130 mg/dL  (1) Increase the insulin dose every 3-4 days as needed.   (2) Increase by 1 units if FBG average concentration is 131-170 mg/dL.   (3) Increase by 2 units if FBG average concentration is 171-210 mg/dL.   (4) Increase by 4 units if FBG average concentration is 221-260 mg/dL.   (5) Increase by 6 units if FBG average concentration is greater than 261mg/dL and call us.   Consider cutting back by 1-2 units to previous dose if glucose concentration is below 100 mg/dL or symptoms of hypoglycemia.      (1) Rapid-acting insulin  Glucose reading  Change to be made to insulin dose    < 80 mg/dL  Subtract 1 unit    100-130 mg/dL  15 units.   (fixed or based on carbohydrate intake)    130-170 mg/dL  Add 1 unit    171-215 mg/dL  Add 2 units    216-260 mg/dL  Add 3 units       -Blood glucose and A1c target    However, more aggressive diabetic control is reasonable when tolerated.  Pt's treatment of Hypoglycemia. 15:15 Rule discussed with patient      2.  Cardiovascular  Is LDL <100: y  Is Blood pressure <130/80:  most office visits   Some dizziness with standing, discussed in clinic today.   Medication(s) recommended.   No change to medications today    3.  Lifestyle changes   Focus on eating a DASH/Mediterranean-style diet.   Exercise 30 minutes daily at least 5 days/week, as tolerated.  https://www.niddk.nih.gov/bwp          Follow-up appointment in 8 week(s)    Levy Scherer, PharmD, MS,  DENNISCP, [facility]  Phone [phone] fax [phone]    This note was created using voice recognition software (Dragon). The accuracy of the dictation is limited by the abilities of the software. I have reviewed the note prior to signing, however some errors in grammar and context are still possible. If you have any questions related to this note please do not hesitate to contact our office.     CC:   Pcp Pt States None  No ref. provider found  Dr. Gary Mcfarlane

## 2023-06-27 NOTE — PATIENT INSTRUCTIONS
Medication(s) recommended:   Continue metformin 1000 mg twice daily   Decrease  Lantus to 35 units at night ( no more than 50 units)  Continue Humalog to 15 units before lunch/dinner.     Long-acting insulin:   Adjust dose according to FASTING BLOOD GLUCOSE target 100-130 mg/dL  (1) Increase the insulin dose every 3-4 days as needed.   (2) Increase by 1 units if FBG average concentration is 131-170 mg/dL.   (3) Increase by 2 units if FBG average concentration is 171-210 mg/dL.   (4) Increase by 4 units if FBG average concentration is 221-260 mg/dL.   (5) Increase by 6 units if FBG average concentration is greater than 261mg/dL and call us.   Consider cutting back by 1-2 units to previous dose if glucose concentration is below 100 mg/dL or symptoms of hypoglycemia.      (1) Rapid-acting insulin  Glucose reading  Change to be made to insulin dose    < 80 mg/dL  Subtract 1 unit    100-130 mg/dL  15 units.   (fixed or based on carbohydrate intake)    130-170 mg/dL  Add 1 unit    171-215 mg/dL  Add 2 units    216-260 mg/dL  Add 3 units

## 2023-07-06 DIAGNOSIS — I50.21 ACUTE SYSTOLIC HEART FAILURE (HCC): ICD-10-CM

## 2023-07-07 RX ORDER — FUROSEMIDE 20 MG/1
TABLET ORAL
Qty: 100 TABLET | Refills: 1 | Status: SHIPPED | OUTPATIENT
Start: 2023-07-07 | End: 2023-09-26 | Stop reason: SDUPTHER

## 2023-08-04 DIAGNOSIS — D50.9 IRON DEFICIENCY ANEMIA, UNSPECIFIED IRON DEFICIENCY ANEMIA TYPE: ICD-10-CM

## 2023-08-07 RX ORDER — FERROUS SULFATE 325(65) MG
325 TABLET ORAL DAILY
Qty: 90 TABLET | Refills: 3 | Status: SHIPPED | OUTPATIENT
Start: 2023-08-07 | End: 2023-08-08

## 2023-08-08 NOTE — PROGRESS NOTES
Per Dr Rahman the patient would need to have an appointment since he has not preeti seen in over a year and the Pt can get the medication over the counter. Med discontinued.   Pt's desire is to return home with increase functional activity.

## 2023-08-25 DIAGNOSIS — I50.21 ACUTE SYSTOLIC HEART FAILURE (HCC): ICD-10-CM

## 2023-08-25 RX ORDER — SPIRONOLACTONE 25 MG/1
25 TABLET ORAL DAILY
Qty: 90 TABLET | Refills: 0 | Status: SHIPPED | OUTPATIENT
Start: 2023-08-25 | End: 2023-09-26 | Stop reason: SDUPTHER

## 2023-08-29 ENCOUNTER — NON-PROVIDER VISIT (OUTPATIENT)
Dept: CARDIOLOGY | Facility: MEDICAL CENTER | Age: 68
End: 2023-08-29
Attending: INTERNAL MEDICINE
Payer: MEDICARE

## 2023-08-29 DIAGNOSIS — Z79.4 TYPE 2 DIABETES MELLITUS WITHOUT COMPLICATION, WITH LONG-TERM CURRENT USE OF INSULIN (HCC): ICD-10-CM

## 2023-08-29 DIAGNOSIS — I50.21 ACUTE SYSTOLIC HEART FAILURE (HCC): ICD-10-CM

## 2023-08-29 DIAGNOSIS — E11.9 TYPE 2 DIABETES MELLITUS WITHOUT COMPLICATION, WITH LONG-TERM CURRENT USE OF INSULIN (HCC): ICD-10-CM

## 2023-08-29 PROCEDURE — 99212 OFFICE O/P EST SF 10 MIN: CPT

## 2023-08-29 NOTE — PROGRESS NOTES
Patient Consult Note     Utilized translation services for this encounter.  Language Line - [phone]  [facility] ID - [id number]    ID # [id number]    Primary care physician: Pcp Pt States None    Reason for consult: Management of Uncontrolled Type 2 Diabetes    HPI:  Pac Keith is a 67 y.o. old patient who comes in today for evaluation of above stated problem.    Allergies:  Patient has no known allergies.    Most Recent labs, A1c, and immunizations:    Lab Results   Component Value Date/Time    HBA1C 8.0 (H) 05/01/2023 11:48 AM          Lab Results   Component Value Date/Time    CHOLSTRLTOT 110 05/01/2023 11:48 AM    LDL 45 05/01/2023 11:48 AM    HDL 41 05/01/2023 11:48 AM    TRIGLYCERIDE 119 05/01/2023 11:48 AM       Lab Results   Component Value Date/Time    SODIUM 139 05/01/2023 11:48 AM    POTASSIUM 4.3 05/01/2023 11:48 AM    CHLORIDE 102 05/01/2023 11:48 AM    CO2 25 05/01/2023 11:48 AM    GLUCOSE 88 05/01/2023 11:48 AM    BUN 18 05/01/2023 11:48 AM    CREATININE 0.98 05/01/2023 11:48 AM    CREATININE 0.9 12/29/2005 04:08 PM     Lab Results   Component Value Date/Time    ALKPHOSPHAT 46 05/01/2023 11:48 AM    ASTSGOT 16 05/01/2023 11:48 AM    ALTSGPT 14 05/01/2023 11:48 AM    TBILIRUBIN 0.3 05/01/2023 11:48 AM    INR 1.11 07/17/2017 09:31 AM    ALBUMIN 4.4 05/01/2023 11:48 AM      Lab Results   Component Value Date/Time    MALBCRT 267 (H) 03/28/2022 12:21 PM    MICROALBUR 18.0 03/28/2022 12:21 PM     Immunization History   Administered Date(s) Administered    Hepatitis B Vaccine (Adol/Adult) 03/13/2020    Influenza (IM) Preservative Free - HISTORICAL DATA 11/19/2015    Influenza Seasonal Injectable - Historical Data 10/08/2013, 04/09/2015    Influenza Vaccine Adult HD 08/21/2020, 12/19/2022    Influenza Vaccine Quad Inj (Pf) 10/12/2018, 10/04/2019    Influenza Vaccine Quad Inj (Preserved) 10/27/2016    PFIZER PURPLE CAP SARS-COV-2 VACCINATION (12+) 05/05/2021, 05/26/2021    Pneumococcal polysaccharide  vaccine (PPSV-23) 08/21/2020, 04/14/2022    Tdap Vaccine 08/21/2020    Zoster Vaccine Recombinant (RZV) (SHINGRIX) 08/21/2020, 01/26/2021           Physical Examination:  Vital signs: There were no vitals taken for this visit. There is no height or weight on file to calculate BMI.    Change in weight: Stable  Exercise habits: no regular exercise program   Diet: common adult    Medications:    Current Outpatient Medications:     clopidogrel, 75 mg, Oral, DAILY    rosuvastatin, TAKE 1 TABLET BY MOUTH EVERY DAY IN THE EVENING    metoprolol SR, 25 mg, Oral, DAILY    spironolactone, 25 mg, Oral, DAILY    losartan, TAKE 1/2 TABLET BY MOUTH EVERY DAY    furosemide, TAKE 1 TABLET BY MOUTH EVERY DAY    Aspirin Low Dose, TAKE 1 TABLET BY MOUTH EVERY DAY    Lantus SoloStar, 37 Units, Subcutaneous, Q EVENING (Patient taking differently: 35 Units, Subcutaneous, EVERY EVENING, This rx was submitted by a pharmacist working under a collaborative practice agreement.)    Pen Needles, Use with lantus and humalog, use at least 2 per day. May change per pt preference.    metFORMIN ER, TAKE 2 TABLETS BY MOUTH TWICE A DAY    insulin lispro, 10 Units, Subcutaneous, TID AC (Patient taking differently: 20 Units, Subcutaneous, EVERY MORNING BEFORE BREAKFAST, And adjust per sliding scale as provided in clinic.)    ONE TOUCH ULTRA 2, CHECK BLOOD SUGAR 1 TIME A DAY AND AS NEEDED    OneTouch Verio, CHECK BLOOD SUGAR ONE-TWO TIME PER DAY AND AS NEEDED FOR HIGH OR LOW SUGAR    Lancets, Per insurance coverage. Check blood sugar 1x per day and prn ssx of high or low sugar    [facility] Prep, PER INSURANCE COVERAGE. WIPE SITE WITH PREP PAD PRIOR TO INJECTION    vitamin D, 2,000 Units, Oral, DAILY      Assessment and Plan:  1. DM2   Most recent A1c is: above goal, but much better   BS 75-135in the AM,   2 episodes of hypoglycemia since the last appt   He presented with his wife in clinic today  Cost of Farxiga is too much.   Consider PAP at the next  appt  They declined Januvia, or similar drug due to cost  Kidney function:   Latest Reference Range & Units 09/27/22 12:45 10/11/22 15:56 05/01/23 11:48   GFR (CKD-EPI) >60 mL/min/1.73 m 2 51 ! 62 83     Medication(s) recommended:   Continue metformin 1000 mg twice daily   Decrease  Lantus to 33 units at night   Continue Humalog to 15 units before lunch/dinner.     Long-acting insulin:   Adjust dose according to FASTING BLOOD GLUCOSE target 100-130 mg/dL  (1) Increase the insulin dose every 3-4 days as needed.   (2) Increase by 1 units if FBG average concentration is 131-170 mg/dL.   (3) Increase by 2 units if FBG average concentration is 171-210 mg/dL.   (4) Increase by 4 units if FBG average concentration is 221-260 mg/dL.   (5) Increase by 6 units if FBG average concentration is greater than 261mg/dL and call us.   Consider cutting back by 1-2 units to previous dose if glucose concentration is below 100 mg/dL or symptoms of hypoglycemia.      (1) Rapid-acting insulin  Glucose reading  Change to be made to insulin dose    < 80 mg/dL  Subtract 1 unit    100-130 mg/dL  15 units.   (fixed or based on carbohydrate intake)    130-170 mg/dL  Add 1 unit    171-215 mg/dL  Add 2 units    216-260 mg/dL  Add 3 units       -Blood glucose and A1c target    However, more aggressive diabetic control is reasonable when tolerated.  Pt's treatment of Hypoglycemia. 15:15 Rule discussed with patient      2.  Cardiovascular  Is LDL <100: y  Is Blood pressure <130/80:  most office visits   Some dizziness with standing, discussed in clinic today.   Medication(s) recommended.   No change to medications today    3.  Lifestyle changes   Focus on eating a DASH/Mediterranean-style diet.   Exercise 30 minutes daily at least 5 days/week, as tolerated.  https://www.niddk.nih.gov/bwp          Follow-up appointment in 8 week(s)    Levy Scherer, PharmD, MS, BCACP, C  [facility]  Phone [phone] fax  [phone]    This note was created using voice recognition software (Dragon). The accuracy of the dictation is limited by the abilities of the software. I have reviewed the note prior to signing, however some errors in grammar and context are still possible. If you have any questions related to this note please do not hesitate to contact our office.     CC:   Pcp Pt States None  No ref. provider found  Dr. Gary Mcfarlane

## 2023-09-19 ENCOUNTER — HOSPITAL ENCOUNTER (OUTPATIENT)
Dept: LAB | Facility: MEDICAL CENTER | Age: 68
End: 2023-09-19
Attending: NURSE PRACTITIONER
Payer: MEDICARE

## 2023-09-19 DIAGNOSIS — Z79.4 TYPE 2 DIABETES MELLITUS WITHOUT COMPLICATION, WITH LONG-TERM CURRENT USE OF INSULIN (HCC): ICD-10-CM

## 2023-09-19 DIAGNOSIS — E11.9 TYPE 2 DIABETES MELLITUS WITHOUT COMPLICATION, WITH LONG-TERM CURRENT USE OF INSULIN (HCC): ICD-10-CM

## 2023-09-19 LAB
ALBUMIN SERPL BCP-MCNC: 4.2 G/DL (ref 3.2–4.9)
ALBUMIN/GLOB SERPL: 1.4 G/DL
ALP SERPL-CCNC: 52 U/L (ref 30–99)
ALT SERPL-CCNC: 11 U/L (ref 2–50)
ANION GAP SERPL CALC-SCNC: 9 MMOL/L (ref 7–16)
AST SERPL-CCNC: 12 U/L (ref 12–45)
BILIRUB SERPL-MCNC: 0.3 MG/DL (ref 0.1–1.5)
BUN SERPL-MCNC: 10 MG/DL (ref 8–22)
CALCIUM ALBUM COR SERPL-MCNC: 9.7 MG/DL (ref 8.5–10.5)
CALCIUM SERPL-MCNC: 9.9 MG/DL (ref 8.5–10.5)
CHLORIDE SERPL-SCNC: 102 MMOL/L (ref 96–112)
CO2 SERPL-SCNC: 27 MMOL/L (ref 20–33)
CREAT SERPL-MCNC: 1 MG/DL (ref 0.5–1.4)
EST. AVERAGE GLUCOSE BLD GHB EST-MCNC: 235 MG/DL
GFR SERPLBLD CREATININE-BSD FMLA CKD-EPI: 81 ML/MIN/1.73 M 2
GLOBULIN SER CALC-MCNC: 2.9 G/DL (ref 1.9–3.5)
GLUCOSE SERPL-MCNC: 90 MG/DL (ref 65–99)
HBA1C MFR BLD: 9.8 % (ref 4–5.6)
POTASSIUM SERPL-SCNC: 4 MMOL/L (ref 3.6–5.5)
PROT SERPL-MCNC: 7.1 G/DL (ref 6–8.2)
SODIUM SERPL-SCNC: 138 MMOL/L (ref 135–145)

## 2023-09-19 PROCEDURE — 83036 HEMOGLOBIN GLYCOSYLATED A1C: CPT | Mod: GA

## 2023-09-19 PROCEDURE — 80053 COMPREHEN METABOLIC PANEL: CPT

## 2023-09-19 PROCEDURE — 36415 COLL VENOUS BLD VENIPUNCTURE: CPT | Mod: GA

## 2023-09-26 ENCOUNTER — NON-PROVIDER VISIT (OUTPATIENT)
Dept: CARDIOLOGY | Facility: MEDICAL CENTER | Age: 68
End: 2023-09-26
Attending: INTERNAL MEDICINE
Payer: MEDICARE

## 2023-09-26 DIAGNOSIS — E11.42 DIABETIC POLYNEUROPATHY ASSOCIATED WITH TYPE 2 DIABETES MELLITUS (HCC): ICD-10-CM

## 2023-09-26 DIAGNOSIS — I50.21 ACUTE SYSTOLIC HEART FAILURE (HCC): ICD-10-CM

## 2023-09-26 PROCEDURE — 99212 OFFICE O/P EST SF 10 MIN: CPT

## 2023-09-26 RX ORDER — SPIRONOLACTONE 25 MG/1
25 TABLET ORAL DAILY
Qty: 90 TABLET | Refills: 1 | Status: SHIPPED | OUTPATIENT
Start: 2023-09-26 | End: 2023-10-17 | Stop reason: SDUPTHER

## 2023-09-26 RX ORDER — FUROSEMIDE 20 MG/1
20 TABLET ORAL
Qty: 100 TABLET | Refills: 1 | Status: SHIPPED | OUTPATIENT
Start: 2023-09-26 | End: 2023-10-17 | Stop reason: SDUPTHER

## 2023-09-26 RX ORDER — EMPAGLIFLOZIN 10 MG/1
10 TABLET, FILM COATED ORAL DAILY
COMMUNITY

## 2023-09-26 RX ORDER — LOSARTAN POTASSIUM 25 MG/1
12.5 TABLET ORAL
Qty: 45 TABLET | Refills: 1 | Status: SHIPPED | OUTPATIENT
Start: 2023-09-26 | End: 2024-03-05

## 2023-09-26 RX ORDER — PEN NEEDLE, DIABETIC 33 GX5/32"
NEEDLE, DISPOSABLE MISCELLANEOUS
Qty: 100 EACH | Refills: 99 | Status: SHIPPED | OUTPATIENT
Start: 2023-09-26

## 2023-09-26 RX ORDER — INSULIN LISPRO 100 [IU]/ML
10 INJECTION, SOLUTION INTRAVENOUS; SUBCUTANEOUS
Qty: 10 ML | Refills: 6 | Status: SHIPPED | OUTPATIENT
Start: 2023-09-26 | End: 2023-10-17 | Stop reason: SDUPTHER

## 2023-09-26 RX ORDER — METOPROLOL SUCCINATE 25 MG/1
25 TABLET, EXTENDED RELEASE ORAL DAILY
Qty: 90 TABLET | Refills: 1 | Status: SHIPPED | OUTPATIENT
Start: 2023-09-26 | End: 2023-10-17 | Stop reason: SDUPTHER

## 2023-09-26 RX ORDER — ROSUVASTATIN CALCIUM 40 MG/1
40 TABLET, COATED ORAL EVERY EVENING
Qty: 90 TABLET | Refills: 1 | Status: SHIPPED | OUTPATIENT
Start: 2023-09-26 | End: 2023-10-17 | Stop reason: SDUPTHER

## 2023-09-26 RX ORDER — INSULIN GLARGINE 100 [IU]/ML
35 INJECTION, SOLUTION SUBCUTANEOUS EVERY EVENING
Qty: 15 ML | Refills: 6 | Status: SHIPPED | OUTPATIENT
Start: 2023-09-26 | End: 2023-10-17 | Stop reason: SDUPTHER

## 2023-09-26 RX ORDER — METFORMIN HYDROCHLORIDE 500 MG/1
1000 TABLET, EXTENDED RELEASE ORAL 2 TIMES DAILY
Qty: 360 TABLET | Refills: 1 | Status: SHIPPED | OUTPATIENT
Start: 2023-09-26 | End: 2023-10-17 | Stop reason: SDUPTHER

## 2023-09-26 NOTE — PROGRESS NOTES
Patient Consult Note     Utilized translation services for this encounter.  Language Line - [phone]  [facility] ID - [id number]    ID #       Primary care physician: Pcp Pt States None    Reason for consult: Management of Uncontrolled Type 2 Diabetes    HPI:  Pac Keith is a 67 y.o. old patient who comes in today for evaluation of above stated problem.    Allergies:  Patient has no known allergies.    Most Recent labs, A1c, and immunizations:    Lab Results   Component Value Date/Time    HBA1C 9.8 (H) 09/19/2023 11:57 AM          Lab Results   Component Value Date/Time    CHOLSTRLTOT 110 05/01/2023 11:48 AM    LDL 45 05/01/2023 11:48 AM    HDL 41 05/01/2023 11:48 AM    TRIGLYCERIDE 119 05/01/2023 11:48 AM       Lab Results   Component Value Date/Time    SODIUM 138 09/19/2023 11:57 AM    POTASSIUM 4.0 09/19/2023 11:57 AM    CHLORIDE 102 09/19/2023 11:57 AM    CO2 27 09/19/2023 11:57 AM    GLUCOSE 90 09/19/2023 11:57 AM    BUN 10 09/19/2023 11:57 AM    CREATININE 1.00 09/19/2023 11:57 AM    CREATININE 0.9 12/29/2005 04:08 PM     Lab Results   Component Value Date/Time    ALKPHOSPHAT 52 09/19/2023 11:57 AM    ASTSGOT 12 09/19/2023 11:57 AM    ALTSGPT 11 09/19/2023 11:57 AM    TBILIRUBIN 0.3 09/19/2023 11:57 AM    INR 1.11 07/17/2017 09:31 AM    ALBUMIN 4.2 09/19/2023 11:57 AM      Lab Results   Component Value Date/Time    MALBCRT 267 (H) 03/28/2022 12:21 PM    MICROALBUR 18.0 03/28/2022 12:21 PM     Immunization History   Administered Date(s) Administered    Hepatitis B Vaccine (Adol/Adult) 03/13/2020    Influenza (IM) Preservative Free - HISTORICAL DATA 11/19/2015    Influenza Seasonal Injectable - Historical Data 10/08/2013, 04/09/2015    Influenza Vaccine Adult HD 08/21/2020, 12/19/2022    Influenza Vaccine Quad Inj (Pf) 10/12/2018, 10/04/2019    Influenza Vaccine Quad Inj (Preserved) 10/27/2016    PFIZER PURPLE CAP SARS-COV-2 VACCINATION (12+) 05/05/2021, 05/26/2021    Pneumococcal polysaccharide  vaccine (PPSV-23) 08/21/2020, 04/14/2022    Tdap Vaccine 08/21/2020    Zoster Vaccine Recombinant (RZV) (SHINGRIX) 08/21/2020, 01/26/2021           Physical Examination:  Vital signs: There were no vitals taken for this visit. There is no height or weight on file to calculate BMI.    Change in weight: Stable  Exercise habits: no regular exercise program   Diet: common adult    Medications:    Current Outpatient Medications:     clopidogrel, 75 mg, Oral, DAILY    rosuvastatin, TAKE 1 TABLET BY MOUTH EVERY DAY IN THE EVENING    metoprolol SR, 25 mg, Oral, DAILY    spironolactone, 25 mg, Oral, DAILY    losartan, TAKE 1/2 TABLET BY MOUTH EVERY DAY    furosemide, TAKE 1 TABLET BY MOUTH EVERY DAY    Aspirin Low Dose, TAKE 1 TABLET BY MOUTH EVERY DAY    Lantus SoloStar, 37 Units, Subcutaneous, Q EVENING (Patient taking differently: 33 Units, Subcutaneous, EVERY EVENING, This rx was submitted by a pharmacist working under a collaborative practice agreement.)    Pen Needles, Use with lantus and humalog, use at least 2 per day. May change per pt preference.    metFORMIN ER, TAKE 2 TABLETS BY MOUTH TWICE A DAY    insulin lispro, 10 Units, Subcutaneous, TID AC (Patient taking differently: 20 Units, Subcutaneous, EVERY MORNING BEFORE BREAKFAST, And adjust per sliding scale as provided in clinic.)    ONE TOUCH ULTRA 2, CHECK BLOOD SUGAR 1 TIME A DAY AND AS NEEDED    OneTouch Verio, CHECK BLOOD SUGAR ONE-TWO TIME PER DAY AND AS NEEDED FOR HIGH OR LOW SUGAR    Lancets, Per insurance coverage. Check blood sugar 1x per day and prn ssx of high or low sugar    [employer] Prep, PER INSURANCE COVERAGE. WIPE SITE WITH PREP PAD PRIOR TO INJECTION    vitamin D, 2,000 Units, Oral, DAILY      Assessment and Plan:  1. DM2   Most recent A1c is: above goal  but he reports AM BS <130, sometimes even near 80, so no increase in Lantus today   He reports his blood sugars near meals is near goal.   at the prior appt he had 2 episodes of hypoglycemia which  was why we decreased his dose of lantus, overall better today.    He presented with his daughter in clinic today, but still used translation line.   Cost of Farxiga is too much.   Will get it via PAP, provided them the paperwork and they will get it back to me at the next appt  They declined Januvia in the past, or similar drug due to cost  Due to his low body weight I want to use a SGLT2 first rather than a GLP-1  Kidney function:   Latest Reference Range & Units 05/01/23 11:48 09/19/23 11:57   Creatinine 0.50 - 1.40 mg/dL 0.98 1.00   GFR (CKD-EPI) >60 mL/min/1.73 m 2 83 81     Medication(s) recommended:   Continue metformin 1000 mg twice daily   Continue  Lantus to 35 units at night   Continue Humalog to 15 units before lunch/dinner.   Start Farxiga 10mg daily - via PAP   Sent in a CGM to pharmacy, but cost might be too much.     Long-acting insulin:   Adjust dose according to FASTING BLOOD GLUCOSE target 100-130 mg/dL  (1) Increase the insulin dose every 3-4 days as needed.   (2) Increase by 1 units if FBG average concentration is 131-170 mg/dL.   (3) Increase by 2 units if FBG average concentration is 171-210 mg/dL.   (4) Increase by 4 units if FBG average concentration is 221-260 mg/dL.   (5) Increase by 6 units if FBG average concentration is greater than 261mg/dL and call us.   Consider cutting back by 1-2 units to previous dose if glucose concentration is below 100 mg/dL or symptoms of hypoglycemia.      (1) Rapid-acting insulin  Glucose reading  Change to be made to insulin dose    < 80 mg/dL  Subtract 1 unit    100-130 mg/dL  15 units.   (fixed or based on carbohydrate intake)    130-170 mg/dL  Add 1 unit    171-215 mg/dL  Add 2 units    216-260 mg/dL  Add 3 units       -Blood glucose and A1c target    However, more aggressive diabetic control is reasonable when tolerated.  Pt's treatment of Hypoglycemia. 15:15 Rule discussed with patient      2.  Cardiovascular  Is LDL <100: y  Is Blood pressure <130/80:   most office visits   Some dizziness with standing, discussed in clinic today.   Medication(s) recommended.   No change to medications today  Decrease to 10mg of lasix daily.     3.  Lifestyle changes   Focus on eating a DASH/Mediterranean-style diet.   Exercise 30 minutes daily at least 5 days/week, as tolerated.  https://www.niddk.nih.gov/bwp          Follow-up appointment in 3 week(s)    Levy Scherer, PharmD, MS, BCACP, LCC  [facility]  Phone [phone] fax [phone]    This note was created using voice recognition software (Dragon). The accuracy of the dictation is limited by the abilities of the software. I have reviewed the note prior to signing, however some errors in grammar and context are still possible. If you have any questions related to this note please do not hesitate to contact our office.     CC:   Pcp Pt States None  No ref. provider found  Dr. Gary Mcfarlane

## 2023-09-27 ENCOUNTER — DOCUMENTATION (OUTPATIENT)
Dept: VASCULAR LAB | Facility: MEDICAL CENTER | Age: 68
End: 2023-09-27
Payer: MEDICARE

## 2023-10-17 ENCOUNTER — NON-PROVIDER VISIT (OUTPATIENT)
Dept: CARDIOLOGY | Facility: MEDICAL CENTER | Age: 68
End: 2023-10-17
Attending: INTERNAL MEDICINE
Payer: MEDICARE

## 2023-10-17 VITALS
DIASTOLIC BLOOD PRESSURE: 55 MMHG | HEART RATE: 92 BPM | SYSTOLIC BLOOD PRESSURE: 115 MMHG | WEIGHT: 154 LBS | BODY MASS INDEX: 28.16 KG/M2

## 2023-10-17 DIAGNOSIS — I50.21 ACUTE SYSTOLIC HEART FAILURE (HCC): ICD-10-CM

## 2023-10-17 DIAGNOSIS — E11.42 DIABETIC POLYNEUROPATHY ASSOCIATED WITH TYPE 2 DIABETES MELLITUS (HCC): ICD-10-CM

## 2023-10-17 PROCEDURE — 99213 OFFICE O/P EST LOW 20 MIN: CPT

## 2023-10-17 RX ORDER — SPIRONOLACTONE 25 MG/1
25 TABLET ORAL DAILY
Qty: 90 TABLET | Refills: 1 | Status: SHIPPED | OUTPATIENT
Start: 2023-10-17

## 2023-10-17 RX ORDER — METOPROLOL SUCCINATE 25 MG/1
25 TABLET, EXTENDED RELEASE ORAL DAILY
Qty: 90 TABLET | Refills: 1 | Status: SHIPPED | OUTPATIENT
Start: 2023-10-17

## 2023-10-17 RX ORDER — INSULIN LISPRO 100 [IU]/ML
15 INJECTION, SOLUTION INTRAVENOUS; SUBCUTANEOUS
Qty: 10 ML | Refills: 6 | Status: SHIPPED | OUTPATIENT
Start: 2023-10-17

## 2023-10-17 RX ORDER — METFORMIN HYDROCHLORIDE 500 MG/1
1000 TABLET, EXTENDED RELEASE ORAL 2 TIMES DAILY
Qty: 360 TABLET | Refills: 1 | Status: SHIPPED | OUTPATIENT
Start: 2023-10-17

## 2023-10-17 RX ORDER — INSULIN GLARGINE 100 [IU]/ML
35 INJECTION, SOLUTION SUBCUTANEOUS EVERY EVENING
Qty: 15 ML | Refills: 6 | Status: SHIPPED | OUTPATIENT
Start: 2023-10-17

## 2023-10-17 RX ORDER — FUROSEMIDE 20 MG/1
10 TABLET ORAL
Qty: 45 TABLET | Refills: 1 | Status: SHIPPED | OUTPATIENT
Start: 2023-10-17

## 2023-10-17 RX ORDER — ROSUVASTATIN CALCIUM 40 MG/1
40 TABLET, COATED ORAL EVERY EVENING
Qty: 90 TABLET | Refills: 1 | Status: SHIPPED | OUTPATIENT
Start: 2023-10-17

## 2023-10-17 ASSESSMENT — FIBROSIS 4 INDEX: FIB4 SCORE: 1.11

## 2023-10-17 NOTE — PROGRESS NOTES
Patient Consult Note     Utilized translation services for this encounter.  Language Line - [phone]  [facility] ID - [id number]   [place]  ID # [id number]   - Translation service used for over 45 minutes    Primary care physician: Pcp Pt States None    Reason for consult: Management of Uncontrolled Type 2 Diabetes    HPI:  Chucky Keith is a 67 y.o. old patient who comes in today for evaluation of above stated problem.  - No changes since last visit, patient denies any SOB at rest, chest pain or lower leg swelling.  - Patient unable to fill out Jardiance PAP paperwork, will do this in this visit alongside the patient.     Allergies:  Patient has no known allergies.    Most Recent labs, A1c, and immunizations:    Lab Results   Component Value Date/Time    HBA1C 9.8 (H) 09/19/2023 11:57 AM          Lab Results   Component Value Date/Time    CHOLSTRLTOT 110 05/01/2023 11:48 AM    LDL 45 05/01/2023 11:48 AM    HDL 41 05/01/2023 11:48 AM    TRIGLYCERIDE 119 05/01/2023 11:48 AM       Lab Results   Component Value Date/Time    SODIUM 138 09/19/2023 11:57 AM    POTASSIUM 4.0 09/19/2023 11:57 AM    CHLORIDE 102 09/19/2023 11:57 AM    CO2 27 09/19/2023 11:57 AM    GLUCOSE 90 09/19/2023 11:57 AM    BUN 10 09/19/2023 11:57 AM    CREATININE 1.00 09/19/2023 11:57 AM    CREATININE 0.9 12/29/2005 04:08 PM     Lab Results   Component Value Date/Time    ALKPHOSPHAT 52 09/19/2023 11:57 AM    ASTSGOT 12 09/19/2023 11:57 AM    ALTSGPT 11 09/19/2023 11:57 AM    TBILIRUBIN 0.3 09/19/2023 11:57 AM    INR 1.11 07/17/2017 09:31 AM    ALBUMIN 4.2 09/19/2023 11:57 AM      Lab Results   Component Value Date/Time    MALBCRT 267 (H) 03/28/2022 12:21 PM    MICROALBUR 18.0 03/28/2022 12:21 PM     Immunization History   Administered Date(s) Administered    Hepatitis B Vaccine (Adol/Adult) 03/13/2020    Influenza (IM) Preservative Free - HISTORICAL DATA 11/19/2015    Influenza Seasonal Injectable - Historical Data 10/08/2013, 04/09/2015     Influenza Vaccine Adult HD 08/21/2020, 12/19/2022    Influenza Vaccine Quad Inj (Pf) 10/12/2018, 10/04/2019    Influenza Vaccine Quad Inj (Preserved) 10/27/2016    PFIZER PURPLE CAP SARS-COV-2 VACCINATION (12+) 05/05/2021, 05/26/2021    Pneumococcal polysaccharide vaccine (PPSV-23) 08/21/2020, 04/14/2022    Tdap Vaccine 08/21/2020    Zoster Vaccine Recombinant (RZV) (SHINGRIX) 08/21/2020, 01/26/2021           Physical Examination:  Vital signs: /55 (BP Location: Left arm, Patient Position: Sitting, BP Cuff Size: Adult long)   Pulse 92   Wt 69.9 kg (154 lb)   BMI 28.16 kg/m²  Body mass index is 28.16 kg/m².    Change in weight: Stable  Exercise habits: no regular exercise program   Diet: common adult    Medications:    Current Outpatient Medications:     rosuvastatin, 40 mg, Oral, Q EVENING, Taking    metoprolol SR, 25 mg, Oral, DAILY, Taking    spironolactone, 25 mg, Oral, DAILY, Taking    furosemide, 20 mg, Oral, QDAY (Patient taking differently: 10 mg, Oral, EVERY DAY), Taking    Lantus SoloStar, 35 Units, Subcutaneous, Q EVENING, Taking    Pen Needles, Use with lantus and humalog, use at least 2 per day. May change per pt preference., Taking    metFORMIN ER, 1,000 mg, Oral, BID, Taking    insulin lispro, 10 Units, Subcutaneous, TID AC, Taking    Aspirin Low Dose, TAKE 1 TABLET BY MOUTH EVERY DAY, Taking    clopidogrel, 75 mg, Oral, DAILY, Taking    ONE TOUCH ULTRA 2, CHECK BLOOD SUGAR 1 TIME A DAY AND AS NEEDED, Taking    Lancets, Per insurance coverage. Check blood sugar 1x per day and prn ssx of high or low sugar, Taking    [employer] Prep, PER INSURANCE COVERAGE. WIPE SITE WITH PREP PAD PRIOR TO INJECTION, Taking    Jardiance, 10 mg, Oral, DAILY (Patient not taking: Reported on 10/17/2023), Not Taking    losartan, 12.5 mg, Oral, QDAY, Unknown    OneTouch Verio, CHECK BLOOD SUGAR ONE-TWO TIME PER DAY AND AS NEEDED FOR HIGH OR LOW SUGAR    vitamin D, 2,000 Units, Oral, DAILY      Assessment and Plan:  1. DM2    Most recent A1c is: above goal  but he reports AM BS <130, sometimes even near 80, so no increase in Lantus today   Post prandial glucose near meals not reported.   No episodes of hypoglycemia reported from medication use.   Translation line utilized for visit.   Jardiance PAP: paperwork completed and faxed to company, pending acceptance/denial/more information needed.  Will follow up with pt. If more information such as tax information is needed for financials.   Pending PAP, will continue all medications with no changes.   CGM was to expensive  Kidney function:   Latest Reference Range & Units 05/01/23 11:48 09/19/23 11:57   Creatinine 0.50 - 1.40 mg/dL 0.98 1.00   GFR (CKD-EPI) >60 mL/min/1.73 m 2 83 81     Medication(s) recommended:   Continue metformin 1000 mg twice daily   Continue  Lantus to 35 units at night   Continue Humalog to 15 units before lunch/dinner.   Start jardiance 10mg daily - via PAP     Long-acting insulin:   Adjust dose according to FASTING BLOOD GLUCOSE target 100-130 mg/dL  (1) Increase the insulin dose every 3-4 days as needed.   (2) Increase by 1 units if FBG average concentration is 131-170 mg/dL.   (3) Increase by 2 units if FBG average concentration is 171-210 mg/dL.   (4) Increase by 4 units if FBG average concentration is 221-260 mg/dL.   (5) Increase by 6 units if FBG average concentration is greater than 261mg/dL and call us.   Consider cutting back by 1-2 units to previous dose if glucose concentration is below 100 mg/dL or symptoms of hypoglycemia.      (1) Rapid-acting insulin  Glucose reading  Change to be made to insulin dose    < 80 mg/dL  Subtract 1 unit    100-130 mg/dL  15 units.   (fixed or based on carbohydrate intake)    130-170 mg/dL  Add 1 unit    171-215 mg/dL  Add 2 units    216-260 mg/dL  Add 3 units       -Blood glucose and A1c target    However, more aggressive diabetic control is reasonable when tolerated.  Pt's treatment of Hypoglycemia. 15:15 Rule discussed  with patient      2.  Cardiovascular  Is LDL <100: y  Is Blood pressure <130/80:  most office visits   no dizziness or sx of hypotension per patient discussion  Home meds continue with no changes  Medication(s) recommended.   No change to medications today    3.  Lifestyle changes   Focus on eating a DASH/Mediterranean-style diet.   Exercise 30 minutes daily at least 5 days/week, as tolerated.  https://www.niddk.nih.gov/bwp      Follow-up appointment in 3 week(s)    Demetrius Juárez, Pharmacy Intern    Levy Scherer, PharmD, MS, BCACP, C  [facility]  Phone [phone] fax [phone]    This note was created using voice recognition software (Dragon). The accuracy of the dictation is limited by the abilities of the software. I have reviewed the note prior to signing, however some errors in grammar and context are still possible. If you have any questions related to this note please do not hesitate to contact our office.     CC:   Pcp Pt States None  No ref. provider found  Dr. Gary Mcfarlane

## 2023-11-10 ENCOUNTER — TELEPHONE (OUTPATIENT)
Dept: CARDIOLOGY | Facility: MEDICAL CENTER | Age: 68
End: 2023-11-10
Payer: MEDICARE

## 2023-11-10 DIAGNOSIS — I50.21 ACUTE SYSTOLIC HEART FAILURE (HCC): ICD-10-CM

## 2023-11-14 RX ORDER — CLOPIDOGREL BISULFATE 75 MG/1
75 TABLET ORAL DAILY
Qty: 90 TABLET | Refills: 0 | Status: SHIPPED | OUTPATIENT
Start: 2023-11-14 | End: 2024-02-09

## 2023-11-14 NOTE — TELEPHONE ENCOUNTER
To Schedulers, Last OV 10/14/2022 please schedule FV, thank you!    90 day courtesy refill sent.

## 2023-11-17 ENCOUNTER — TELEPHONE (OUTPATIENT)
Dept: CARDIOLOGY | Facility: MEDICAL CENTER | Age: 68
End: 2023-11-17
Payer: MEDICARE

## 2023-11-28 ENCOUNTER — NON-PROVIDER VISIT (OUTPATIENT)
Dept: CARDIOLOGY | Facility: MEDICAL CENTER | Age: 68
End: 2023-11-28
Attending: INTERNAL MEDICINE
Payer: MEDICARE

## 2023-11-28 VITALS
BODY MASS INDEX: 27.61 KG/M2 | WEIGHT: 151 LBS | DIASTOLIC BLOOD PRESSURE: 54 MMHG | HEART RATE: 73 BPM | SYSTOLIC BLOOD PRESSURE: 117 MMHG

## 2023-11-28 DIAGNOSIS — Z79.4 TYPE 2 DIABETES MELLITUS WITHOUT COMPLICATION, WITH LONG-TERM CURRENT USE OF INSULIN (HCC): ICD-10-CM

## 2023-11-28 DIAGNOSIS — E11.9 TYPE 2 DIABETES MELLITUS WITHOUT COMPLICATION, WITH LONG-TERM CURRENT USE OF INSULIN (HCC): ICD-10-CM

## 2023-11-28 PROCEDURE — 99212 OFFICE O/P EST SF 10 MIN: CPT

## 2023-11-28 ASSESSMENT — FIBROSIS 4 INDEX: FIB4 SCORE: 1.11

## 2023-11-28 NOTE — PROGRESS NOTES
Patient Consult Note     Utilized translation services for this encounter.  Language Line - [phone]  [facility] ID - [id number]   [place]  ID # -    Primary care physician: Pcp Pt States None    Reason for consult: Management of Uncontrolled Type 2 Diabetes    HPI:  Pac Keith is a 67 y.o. old patient who comes in today for evaluation of above stated problem.  - No changes since last visit, patient denies any SOB at rest, chest pain or lower leg swelling.    Allergies:  Patient has no known allergies.    Most Recent labs, A1c, and immunizations:    Lab Results   Component Value Date/Time    HBA1C 9.8 (H) 09/19/2023 11:57 AM          Lab Results   Component Value Date/Time    CHOLSTRLTOT 110 05/01/2023 11:48 AM    LDL 45 05/01/2023 11:48 AM    HDL 41 05/01/2023 11:48 AM    TRIGLYCERIDE 119 05/01/2023 11:48 AM       Lab Results   Component Value Date/Time    SODIUM 138 09/19/2023 11:57 AM    POTASSIUM 4.0 09/19/2023 11:57 AM    CHLORIDE 102 09/19/2023 11:57 AM    CO2 27 09/19/2023 11:57 AM    GLUCOSE 90 09/19/2023 11:57 AM    BUN 10 09/19/2023 11:57 AM    CREATININE 1.00 09/19/2023 11:57 AM    CREATININE 0.9 12/29/2005 04:08 PM     Lab Results   Component Value Date/Time    ALKPHOSPHAT 52 09/19/2023 11:57 AM    ASTSGOT 12 09/19/2023 11:57 AM    ALTSGPT 11 09/19/2023 11:57 AM    TBILIRUBIN 0.3 09/19/2023 11:57 AM    INR 1.11 07/17/2017 09:31 AM    ALBUMIN 4.2 09/19/2023 11:57 AM      Lab Results   Component Value Date/Time    MALBCRT 267 (H) 03/28/2022 12:21 PM    MICROALBUR 18.0 03/28/2022 12:21 PM     Immunization History   Administered Date(s) Administered    Hepatitis B Vaccine (Adol/Adult) 03/13/2020    Influenza (IM) Preservative Free - HISTORICAL DATA 11/19/2015    Influenza Seasonal Injectable - Historical Data 10/08/2013, 04/09/2015    Influenza Vaccine Adult HD 08/21/2020, 12/19/2022    Influenza Vaccine Quad Inj (Pf) 10/12/2018, 10/04/2019    Influenza Vaccine Quad Inj (Preserved) 10/27/2016     PFIZER PURPLE CAP SARS-COV-2 VACCINATION (12+) 05/05/2021, 05/26/2021    Pneumococcal polysaccharide vaccine (PPSV-23) 08/21/2020, 04/14/2022    Tdap Vaccine 08/21/2020    Zoster Vaccine Recombinant (RZV) (SHINGRIX) 08/21/2020, 01/26/2021           Physical Examination:  Vital signs: /54   Pulse 73   Wt 68.5 kg (151 lb)   BMI 27.61 kg/m²  Body mass index is 27.61 kg/m².    Change in weight: Stable  Exercise habits: no regular exercise program   Diet: common adult    Medications:    Current Outpatient Medications:     clopidogrel, 75 mg, Oral, DAILY, Taking    rosuvastatin, 40 mg, Oral, Q EVENING, Taking    metoprolol SR, 25 mg, Oral, DAILY, Taking    spironolactone, 25 mg, Oral, DAILY, Taking    furosemide, 10 mg, Oral, QDAY, Taking    Lantus SoloStar, 35 Units, Subcutaneous, Q EVENING, Taking    metFORMIN ER, 1,000 mg, Oral, BID, Taking    insulin lispro, 15 Units, Subcutaneous, TID AC, Taking    losartan, 12.5 mg, Oral, QDAY, Taking    vitamin D, 2,000 Units, Oral, DAILY, Taking    Jardiance, 10 mg, Oral, DAILY (Patient not taking: Reported on 10/17/2023)    Pen Needles, Use with lantus and humalog, use at least 2 per day. May change per pt preference.    Aspirin Low Dose, TAKE 1 TABLET BY MOUTH EVERY DAY    ONE TOUCH ULTRA 2, CHECK BLOOD SUGAR 1 TIME A DAY AND AS NEEDED    OneTouch Verio, CHECK BLOOD SUGAR ONE-TWO TIME PER DAY AND AS NEEDED FOR HIGH OR LOW SUGAR    Lancets, Per insurance coverage. Check blood sugar 1x per day and prn ssx of high or low sugar    [employer] Prep, PER INSURANCE COVERAGE. WIPE SITE WITH PREP PAD PRIOR TO INJECTION      Assessment and Plan:  1. DM2   Most recent A1c is: above goal  but he reports AM BS <138, denies any low blood sugars  Post prandial glucose near meals not reported.   No episodes of hypoglycemia reported from medication use.   Translation line utilized for visit.   Jardiance PAP: did not get sent to the pt, I will contact them to determine why.   Kidney function:    Latest Reference Range & Units 05/01/23 11:48 09/19/23 11:57   Creatinine 0.50 - 1.40 mg/dL 0.98 1.00   GFR (CKD-EPI) >60 mL/min/1.73 m 2 83 81     Medication(s) recommended:   Continue metformin 1000 mg twice daily   Continue  Lantus to 35 units at night   Continue Humalog to 15 units before lunch/dinner.     Long-acting insulin:   Adjust dose according to FASTING BLOOD GLUCOSE target 100-130 mg/dL  (1) Increase the insulin dose every 3-4 days as needed.   (2) Increase by 1 units if FBG average concentration is 131-170 mg/dL.   (3) Increase by 2 units if FBG average concentration is 171-210 mg/dL.   (4) Increase by 4 units if FBG average concentration is 221-260 mg/dL.   (5) Increase by 6 units if FBG average concentration is greater than 261mg/dL and call us.   Consider cutting back by 1-2 units to previous dose if glucose concentration is below 100 mg/dL or symptoms of hypoglycemia.      (1) Rapid-acting insulin  Glucose reading  Change to be made to insulin dose    < 80 mg/dL  Subtract 1 unit    100-130 mg/dL  15 units.   (fixed or based on carbohydrate intake)    130-170 mg/dL  Add 1 unit    171-215 mg/dL  Add 2 units    216-260 mg/dL  Add 3 units       -Blood glucose and A1c target    However, more aggressive diabetic control is reasonable when tolerated.  Pt's treatment of Hypoglycemia. 15:15 Rule discussed with patient    2.  Cardiovascular  Is LDL <100: y  Is Blood pressure <130/80:  y  no dizziness or sx of hypotension per patient discussion  Home meds continue with no changes  Medication(s) recommended.   No change to medications today    3.  Lifestyle changes   Focus on eating a DASH/Mediterranean-style diet.   Exercise 30 minutes daily at least 5 days/week, as tolerated.  https://www.niddk.nih.gov/bwp      Follow-up appointment in 6 week(s)    Demetrius Juárez, Pharmacy Intern    Levy Scherer, PharmD, MS, BCACP, [facility]  Phone [phone] fax  [phone]    This note was created using voice recognition software (Dragon). The accuracy of the dictation is limited by the abilities of the software. I have reviewed the note prior to signing, however some errors in grammar and context are still possible. If you have any questions related to this note please do not hesitate to contact our office.     CC:   Pcp Pt States None  No ref. provider found  Dr. Gary Mcfarlane   Where Do You Want The Question To Include Opioid Counseling Located?: Case Summary Tab

## 2024-01-15 NOTE — TELEPHONE ENCOUNTER
Received request via: Pharmacy    Was the patient seen in the last year in this department? Yes    Does the patient have an active prescription (recently filled or refills available) for medication(s) requested? No       Please attach ICD10 code to the order.     
unknown

## 2024-01-22 ENCOUNTER — HOSPITAL ENCOUNTER (OUTPATIENT)
Dept: LAB | Facility: MEDICAL CENTER | Age: 69
End: 2024-01-22
Attending: NURSE PRACTITIONER
Payer: MEDICARE

## 2024-01-22 DIAGNOSIS — Z79.4 TYPE 2 DIABETES MELLITUS WITHOUT COMPLICATION, WITH LONG-TERM CURRENT USE OF INSULIN (HCC): ICD-10-CM

## 2024-01-22 DIAGNOSIS — E11.9 TYPE 2 DIABETES MELLITUS WITHOUT COMPLICATION, WITH LONG-TERM CURRENT USE OF INSULIN (HCC): ICD-10-CM

## 2024-01-22 LAB
ALBUMIN SERPL BCP-MCNC: 4.1 G/DL (ref 3.2–4.9)
ALBUMIN/GLOB SERPL: 1.3 G/DL
ALP SERPL-CCNC: 52 U/L (ref 30–99)
ALT SERPL-CCNC: 11 U/L (ref 2–50)
ANION GAP SERPL CALC-SCNC: 10 MMOL/L (ref 7–16)
AST SERPL-CCNC: 12 U/L (ref 12–45)
BILIRUB SERPL-MCNC: 0.3 MG/DL (ref 0.1–1.5)
BUN SERPL-MCNC: 8 MG/DL (ref 8–22)
CALCIUM ALBUM COR SERPL-MCNC: 9.3 MG/DL (ref 8.5–10.5)
CALCIUM SERPL-MCNC: 9.4 MG/DL (ref 8.5–10.5)
CHLORIDE SERPL-SCNC: 103 MMOL/L (ref 96–112)
CHOLEST SERPL-MCNC: 100 MG/DL (ref 100–199)
CO2 SERPL-SCNC: 26 MMOL/L (ref 20–33)
CREAT SERPL-MCNC: 0.89 MG/DL (ref 0.5–1.4)
CREAT UR-MCNC: 46.26 MG/DL
EST. AVERAGE GLUCOSE BLD GHB EST-MCNC: 272 MG/DL
FASTING STATUS PATIENT QL REPORTED: NORMAL
GFR SERPLBLD CREATININE-BSD FMLA CKD-EPI: 92 ML/MIN/1.73 M 2
GLOBULIN SER CALC-MCNC: 3.1 G/DL (ref 1.9–3.5)
GLUCOSE SERPL-MCNC: 148 MG/DL (ref 65–99)
HBA1C MFR BLD: 11.1 % (ref 4–5.6)
HDLC SERPL-MCNC: 36 MG/DL
LDLC SERPL CALC-MCNC: 40 MG/DL
MICROALBUMIN UR-MCNC: 2.9 MG/DL
MICROALBUMIN/CREAT UR: 63 MG/G (ref 0–30)
POTASSIUM SERPL-SCNC: 4.2 MMOL/L (ref 3.6–5.5)
PROT SERPL-MCNC: 7.2 G/DL (ref 6–8.2)
SODIUM SERPL-SCNC: 139 MMOL/L (ref 135–145)
TRIGL SERPL-MCNC: 120 MG/DL (ref 0–149)

## 2024-01-22 PROCEDURE — 80053 COMPREHEN METABOLIC PANEL: CPT

## 2024-01-22 PROCEDURE — 80061 LIPID PANEL: CPT

## 2024-01-22 PROCEDURE — 82570 ASSAY OF URINE CREATININE: CPT

## 2024-01-22 PROCEDURE — 36415 COLL VENOUS BLD VENIPUNCTURE: CPT

## 2024-01-22 PROCEDURE — 82043 UR ALBUMIN QUANTITATIVE: CPT

## 2024-01-22 PROCEDURE — 83036 HEMOGLOBIN GLYCOSYLATED A1C: CPT | Mod: GA

## 2024-01-30 ENCOUNTER — NON-PROVIDER VISIT (OUTPATIENT)
Dept: CARDIOLOGY | Facility: MEDICAL CENTER | Age: 69
End: 2024-01-30
Attending: INTERNAL MEDICINE
Payer: MEDICARE

## 2024-01-30 DIAGNOSIS — Z79.4 TYPE 2 DIABETES MELLITUS WITHOUT COMPLICATION, WITH LONG-TERM CURRENT USE OF INSULIN (HCC): ICD-10-CM

## 2024-01-30 DIAGNOSIS — E11.9 TYPE 2 DIABETES MELLITUS WITHOUT COMPLICATION, WITH LONG-TERM CURRENT USE OF INSULIN (HCC): ICD-10-CM

## 2024-01-30 PROCEDURE — 99212 OFFICE O/P EST SF 10 MIN: CPT

## 2024-01-30 NOTE — PROGRESS NOTES
Patient Consult Note     Utilized translation services for this encounter.  Language Line - [phone]  [facility] ID - [id number]   [place]  ID # -[id number]    Primary care physician: Pcp Pt States None    Reason for consult: Management of Uncontrolled Type 2 Diabetes    HPI:  Pac Keith is a 67 y.o. old patient who comes in today for evaluation of above stated problem.       04/03/22 11:18 10/11/22 17:00   Left Ventrical Ejection Fraction 20 55       Allergies:  Patient has no known allergies.    Most Recent labs, A1c, and immunizations:    Lab Results   Component Value Date/Time    HBA1C 11.1 (H) 01/22/2024 10:09 AM          Lab Results   Component Value Date/Time    CHOLSTRLTOT 100 01/22/2024 10:09 AM    LDL 40 01/22/2024 10:09 AM    HDL 36 (A) 01/22/2024 10:09 AM    TRIGLYCERIDE 120 01/22/2024 10:09 AM       Lab Results   Component Value Date/Time    SODIUM 139 01/22/2024 10:09 AM    POTASSIUM 4.2 01/22/2024 10:09 AM    CHLORIDE 103 01/22/2024 10:09 AM    CO2 26 01/22/2024 10:09 AM    GLUCOSE 148 (H) 01/22/2024 10:09 AM    BUN 8 01/22/2024 10:09 AM    CREATININE 0.89 01/22/2024 10:09 AM    CREATININE 0.9 12/29/2005 04:08 PM     Lab Results   Component Value Date/Time    ALKPHOSPHAT 52 01/22/2024 10:09 AM    ASTSGOT 12 01/22/2024 10:09 AM    ALTSGPT 11 01/22/2024 10:09 AM    TBILIRUBIN 0.3 01/22/2024 10:09 AM    INR 1.11 07/17/2017 09:31 AM    ALBUMIN 4.1 01/22/2024 10:09 AM      Lab Results   Component Value Date/Time    MALBCRT 63 (H) 01/22/2024 10:08 AM    MICROALBUR 2.9 01/22/2024 10:08 AM     Immunization History   Administered Date(s) Administered    Hepatitis B Vaccine (Adol/Adult) 03/13/2020    Influenza (IM) Preservative Free - HISTORICAL DATA 11/19/2015    Influenza Seasonal Injectable - Historical Data 10/08/2013, 04/09/2015    Influenza Vaccine Adult HD 08/21/2020, 12/19/2022    Influenza Vaccine Quad Inj (Pf) 10/12/2018, 10/04/2019    Influenza Vaccine Quad Inj (Preserved) 10/27/2016    PFIZER  PURPLE CAP SARS-COV-2 VACCINATION (12+) 05/05/2021, 05/26/2021    Pneumococcal polysaccharide vaccine (PPSV-23) 08/21/2020, 04/14/2022    Tdap Vaccine 08/21/2020    Zoster Vaccine Recombinant (RZV) (SHINGRIX) 08/21/2020, 01/26/2021       Physical Examination:  Vital signs: There were no vitals taken for this visit. There is no height or weight on file to calculate BMI.    Change in weight: Stable  Exercise habits: no regular exercise program   Diet: common adult    Medications:    Current Outpatient Medications:     FreeStyle Lita 2 Mozier, 1 Each, Does not apply, DAILY    FreeStyle Lita 2 Sensor, 1 Each, Topical, Q14 DAYS    clopidogrel, 75 mg, Oral, DAILY    rosuvastatin, 40 mg, Oral, Q EVENING    metoprolol SR, 25 mg, Oral, DAILY    spironolactone, 25 mg, Oral, DAILY    furosemide, 10 mg, Oral, QDAY    Lantus SoloStar, 35 Units, Subcutaneous, Q EVENING    metFORMIN ER, 1,000 mg, Oral, BID    insulin lispro, 15 Units, Subcutaneous, TID AC    Jardiance, 10 mg, Oral, DAILY (Patient not taking: Reported on 10/17/2023)    losartan, 12.5 mg, Oral, QDAY    Pen Needles, Use with lantus and humalog, use at least 2 per day. May change per pt preference.    Aspirin Low Dose, TAKE 1 TABLET BY MOUTH EVERY DAY    ONE TOUCH ULTRA 2, CHECK BLOOD SUGAR 1 TIME A DAY AND AS NEEDED    OneTouch Verio, CHECK BLOOD SUGAR ONE-TWO TIME PER DAY AND AS NEEDED FOR HIGH OR LOW SUGAR    Lancets, Per insurance coverage. Check blood sugar 1x per day and prn ssx of high or low sugar    [employer] Prep, PER INSURANCE COVERAGE. WIPE SITE WITH PREP PAD PRIOR TO INJECTION    vitamin D, 2,000 Units, Oral, DAILY      Assessment and Plan:  1. DM2   Most recent A1c is: above goal  Translation line utilized for visit.   FBS - 90  BS before meals - 120  Start to test 2-3 hrs after dinner, to determine if we need to change his dose   Referral to endo   Recommend Jardiance via PAP, gave pt paperwork, asked him to get this complete by next appt.   Kidney  function:  Stable     Medication(s) recommended:   Continue metformin 1000 mg twice daily   Continue  Lantus to 35 units at night   Continue Humalog to 15 units before lunch/dinner.   Actos -  contraindicated in CHF    Long-acting insulin:   Adjust dose according to FASTING BLOOD GLUCOSE target 100-130 mg/dL  (1) Increase the insulin dose every 3-4 days as needed.   (2) Increase by 1 units if FBG average concentration is 131-170 mg/dL.   (3) Increase by 2 units if FBG average concentration is 171-210 mg/dL.   (4) Increase by 4 units if FBG average concentration is 221-260 mg/dL.   (5) Increase by 6 units if FBG average concentration is greater than 261mg/dL and call us.   Consider cutting back by 1-2 units to previous dose if glucose concentration is below 100 mg/dL or symptoms of hypoglycemia.      (1) Rapid-acting insulin  Glucose reading  Change to be made to insulin dose    < 80 mg/dL  Subtract 1 unit    100-130 mg/dL  15 units.   (fixed or based on carbohydrate intake)    130-170 mg/dL  Add 1 unit    171-215 mg/dL  Add 2 units    216-260 mg/dL  Add 3 units       -Blood glucose and A1c target    However, more aggressive diabetic control is reasonable when tolerated.  Pt's treatment of Hypoglycemia. 15:15 Rule discussed with patient    2.  Cardiovascular  Is LDL <100: y  Is Blood pressure <130/80:  y  no dizziness or sx of hypotension per patient discussion  Home meds continue with no changes  Medication(s) recommended.   No change to medications today    3.  Lifestyle changes   Focus on eating a DASH/Mediterranean-style diet.   Exercise 30 minutes daily at least 5 days/week, as tolerated.  https://www.niddk.nih.gov/bwp      Follow-up appointment in 8 week(s)    Levy Scherer, PharmD, MS, BCACP, LCC  [facility]  Phone [phone] fax [phone]    This note was created using voice recognition software (Dragon). The accuracy of the dictation is limited by the abilities of  the software. I have reviewed the note prior to signing, however some errors in grammar and context are still possible. If you have any questions related to this note please do not hesitate to contact our office.     CC:   Pcp Pt States None  No ref. provider found  Dr. Gary Mcfarlane

## 2024-02-09 DIAGNOSIS — I50.21 ACUTE SYSTOLIC HEART FAILURE (HCC): ICD-10-CM

## 2024-02-09 RX ORDER — CLOPIDOGREL BISULFATE 75 MG/1
75 TABLET ORAL DAILY
Qty: 90 TABLET | Refills: 0 | Status: SHIPPED | OUTPATIENT
Start: 2024-02-09

## 2024-02-09 NOTE — TELEPHONE ENCOUNTER
Schedulers: Please call pt to schedule with any General Cardiology provider. LV 10/22 with CR. Thank you

## 2024-02-22 ENCOUNTER — TELEPHONE (OUTPATIENT)
Dept: CARDIOLOGY | Facility: MEDICAL CENTER | Age: 69
End: 2024-02-22
Payer: MEDICARE

## 2024-02-27 ENCOUNTER — NON-PROVIDER VISIT (OUTPATIENT)
Dept: CARDIOLOGY | Facility: MEDICAL CENTER | Age: 69
End: 2024-02-27
Attending: INTERNAL MEDICINE
Payer: MEDICARE

## 2024-02-27 VITALS
BODY MASS INDEX: 26.75 KG/M2 | DIASTOLIC BLOOD PRESSURE: 55 MMHG | WEIGHT: 151 LBS | HEIGHT: 63 IN | HEART RATE: 76 BPM | SYSTOLIC BLOOD PRESSURE: 118 MMHG

## 2024-02-27 DIAGNOSIS — Z79.4 TYPE 2 DIABETES MELLITUS WITHOUT COMPLICATION, WITH LONG-TERM CURRENT USE OF INSULIN (HCC): ICD-10-CM

## 2024-02-27 DIAGNOSIS — E11.9 TYPE 2 DIABETES MELLITUS WITHOUT COMPLICATION, WITH LONG-TERM CURRENT USE OF INSULIN (HCC): ICD-10-CM

## 2024-02-27 PROCEDURE — 99212 OFFICE O/P EST SF 10 MIN: CPT

## 2024-02-27 ASSESSMENT — FIBROSIS 4 INDEX: FIB4 SCORE: 1.130667542166613585

## 2024-02-27 NOTE — PATIENT INSTRUCTIONS
Long-acting insulin:   Adjust dose according to FASTING BLOOD GLUCOSE target 100-130 mg/dL  (1) Increase the insulin dose every 3-4 days as needed.   (2) Increase by 1 units if FBG average concentration is 131-170 mg/dL.   (3) Increase by 2 units if FBG average concentration is 171-210 mg/dL.   (4) Increase by 4 units if FBG average concentration is 221-260 mg/dL.   (5) Increase by 6 units if FBG average concentration is greater than 261mg/dL and call us.   Consider cutting back by 1-2 units to previous dose if glucose concentration is below 100 mg/dL or symptoms of hypoglycemia.      (1) Rapid-acting insulin  Glucose reading  Change to be made to insulin dose    < 80 mg/dL  Subtract 1 unit    100-130 mg/dL  15 units.   (fixed or based on carbohydrate intake)    130-170 mg/dL  Add 1 unit    171-215 mg/dL  Add 2 units    216-260 mg/dL  Add 3 units

## 2024-02-27 NOTE — PROGRESS NOTES
Patient Consult Note     Utilized translation services for this encounter.  Language Line - [phone]  [facility] ID - [id number]   [place]  ID # -[id number]    Primary care physician: Pcp Pt States None    Reason for consult: Management of Uncontrolled Type 2 Diabetes    HPI:  Pac Keith is a 67 y.o. old patient who comes in today for evaluation of above stated problem.       04/03/22 11:18 10/11/22 17:00   Left Ventrical Ejection Fraction 20 55       Allergies:  Patient has no known allergies.    Most Recent labs, A1c, and immunizations:    Lab Results   Component Value Date/Time    HBA1C 11.1 (H) 01/22/2024 10:09 AM          Lab Results   Component Value Date/Time    CHOLSTRLTOT 100 01/22/2024 10:09 AM    LDL 40 01/22/2024 10:09 AM    HDL 36 (A) 01/22/2024 10:09 AM    TRIGLYCERIDE 120 01/22/2024 10:09 AM       Lab Results   Component Value Date/Time    SODIUM 139 01/22/2024 10:09 AM    POTASSIUM 4.2 01/22/2024 10:09 AM    CHLORIDE 103 01/22/2024 10:09 AM    CO2 26 01/22/2024 10:09 AM    GLUCOSE 148 (H) 01/22/2024 10:09 AM    BUN 8 01/22/2024 10:09 AM    CREATININE 0.89 01/22/2024 10:09 AM    CREATININE 0.9 12/29/2005 04:08 PM     Lab Results   Component Value Date/Time    ALKPHOSPHAT 52 01/22/2024 10:09 AM    ASTSGOT 12 01/22/2024 10:09 AM    ALTSGPT 11 01/22/2024 10:09 AM    TBILIRUBIN 0.3 01/22/2024 10:09 AM    INR 1.11 07/17/2017 09:31 AM    ALBUMIN 4.1 01/22/2024 10:09 AM      Lab Results   Component Value Date/Time    MALBCRT 63 (H) 01/22/2024 10:08 AM    MICROALBUR 2.9 01/22/2024 10:08 AM     Immunization History   Administered Date(s) Administered    Hepatitis B Vaccine (Adol/Adult) 03/13/2020    Influenza (IM) Preservative Free - HISTORICAL DATA 11/19/2015    Influenza Seasonal Injectable - Historical Data 10/08/2013, 04/09/2015    Influenza Vaccine Adult HD 08/21/2020, 12/19/2022    Influenza Vaccine Quad Inj (Pf) 10/12/2018, 10/04/2019    Influenza Vaccine Quad Inj (Preserved) 10/27/2016    PFIZER  "PURPLE CAP SARS-COV-2 VACCINATION (12+) 05/05/2021, 05/26/2021    Pneumococcal polysaccharide vaccine (PPSV-23) 08/21/2020, 04/14/2022    Tdap Vaccine 08/21/2020    Zoster Vaccine Recombinant (RZV) (SHINGRIX) 08/21/2020, 01/26/2021       Physical Examination:  Vital signs: /55   Pulse 76   Ht 1.6 m (5' 3\")   Wt 68.5 kg (151 lb)   BMI 26.75 kg/m²  Body mass index is 26.75 kg/m².    Change in weight: Stable  Exercise habits: no regular exercise program   Diet: common adult    Medications:    Current Outpatient Medications:     clopidogrel, 75 mg, Oral, DAILY    FreeStyle Lita 2 [place], 1 Each, Does not apply, DAILY    FreeStyle Lita 2 Sensor, 1 Each, Topical, Q14 DAYS    rosuvastatin, 40 mg, Oral, Q EVENING    metoprolol SR, 25 mg, Oral, DAILY    spironolactone, 25 mg, Oral, DAILY    furosemide, 10 mg, Oral, QDAY    Lantus SoloStar, 35 Units, Subcutaneous, Q EVENING (Patient taking differently: 37 Units, Subcutaneous, EVERY EVENING, This rx was submitted by a pharmacist working under a collaborative practice agreement.)    metFORMIN ER, 1,000 mg, Oral, BID    insulin lispro, 15 Units, Subcutaneous, TID AC    Jardiance, 10 mg, Oral, DAILY (Patient not taking: Reported on 10/17/2023)    losartan, 12.5 mg, Oral, QDAY    Pen Needles, Use with lantus and humalog, use at least 2 per day. May change per pt preference.    Aspirin Low Dose, TAKE 1 TABLET BY MOUTH EVERY DAY    ONE TOUCH ULTRA 2, CHECK BLOOD SUGAR 1 TIME A DAY AND AS NEEDED    OneTouch Verio, CHECK BLOOD SUGAR ONE-TWO TIME PER DAY AND AS NEEDED FOR HIGH OR LOW SUGAR    Lancets, Per insurance coverage. Check blood sugar 1x per day and prn ssx of high or low sugar    [facility] Prep, PER INSURANCE COVERAGE. WIPE SITE WITH PREP PAD PRIOR TO INJECTION    vitamin D, 2,000 Units, Oral, DAILY      Assessment and Plan:  1. DM2   Translation line utilized for visit.   Most recent A1c is: above goal  Due for retinal scan due to some vision issues   FBS -  sometimes " he test after meals, we talked about timing of meals and testing.   BS before meals - 120  Start to test 2-3 hrs after dinner, to determine if we need to change his dose   Referral to [facility]   Kidney function:  Stable     Medication(s) recommended:   Continue metformin 1000 mg twice daily   Continue  Lantus to 37 units at night   Continue Humalog to 15 units before lunch/dinner.     Start Jardiance 10mg daily via PAP, I will send paperwork today and scan to media .     Consider GLP1 at next appt, he will need PAP for this due to cost.   Actos -  contraindicated in CHF    Long-acting insulin:   Adjust dose according to FASTING BLOOD GLUCOSE target 100-130 mg/dL  (1) Increase the insulin dose every 3-4 days as needed.   (2) Increase by 1 units if FBG average concentration is 131-170 mg/dL.   (3) Increase by 2 units if FBG average concentration is 171-210 mg/dL.   (4) Increase by 4 units if FBG average concentration is 221-260 mg/dL.   (5) Increase by 6 units if FBG average concentration is greater than 261mg/dL and call us.   Consider cutting back by 1-2 units to previous dose if glucose concentration is below 100 mg/dL or symptoms of hypoglycemia.      (1) Rapid-acting insulin  Glucose reading  Change to be made to insulin dose    < 80 mg/dL  Subtract 1 unit    100-130 mg/dL  15 units.   (fixed or based on carbohydrate intake)    130-170 mg/dL  Add 1 unit    171-215 mg/dL  Add 2 units    216-260 mg/dL  Add 3 units       -Blood glucose and A1c target        2.  Cardiovascular  Is LDL <100: y  Is Blood pressure <130/80:  y  no dizziness or sx of hypotension per patient discussion  Home meds continue with no changes  Medication(s) recommended.   No change to medications today    3.  Lifestyle changes   Focus on eating a DASH/Mediterranean-style diet.   Exercise 30 minutes daily at least 5 days/week, as tolerated.  https://www.niddk.nih.gov/bwp      Follow-up appointment in 8 week(s)    Levy Scherer, PharmD, MS, BCACP,  [facility]  Phone [phone] fax [phone]    This note was created using voice recognition software (Dragon). The accuracy of the dictation is limited by the abilities of the software. I have reviewed the note prior to signing, however some errors in grammar and context are still possible. If you have any questions related to this note please do not hesitate to contact our office.     CC:   Pcp Pt States None  No ref. provider found  Dr. Gary Mcfarlane

## 2024-02-28 ENCOUNTER — DOCUMENTATION (OUTPATIENT)
Dept: VASCULAR LAB | Facility: MEDICAL CENTER | Age: 69
End: 2024-02-28
Payer: MEDICARE

## 2024-03-04 DIAGNOSIS — E11.42 DIABETIC POLYNEUROPATHY ASSOCIATED WITH TYPE 2 DIABETES MELLITUS (HCC): ICD-10-CM

## 2024-03-05 RX ORDER — LOSARTAN POTASSIUM 25 MG/1
12.5 TABLET ORAL DAILY
Qty: 45 TABLET | Refills: 0 | Status: SHIPPED | OUTPATIENT
Start: 2024-03-05

## 2024-03-06 NOTE — TELEPHONE ENCOUNTER
Received request via: Pharmacy    Was the patient seen in the last year in this department? No    Does the patient have an active prescription (recently filled or refills available) for medication(s) requested? No      Does the patient have halfway Plus and need 100 day supply (blood pressure, diabetes and cholesterol meds only)? Patient does not have SCP

## 2024-04-18 ENCOUNTER — HOSPITAL ENCOUNTER (OUTPATIENT)
Dept: LAB | Facility: MEDICAL CENTER | Age: 69
End: 2024-04-18
Attending: INTERNAL MEDICINE
Payer: MEDICARE

## 2024-04-18 DIAGNOSIS — Z79.4 TYPE 2 DIABETES MELLITUS WITHOUT COMPLICATION, WITH LONG-TERM CURRENT USE OF INSULIN (HCC): ICD-10-CM

## 2024-04-18 DIAGNOSIS — E11.9 TYPE 2 DIABETES MELLITUS WITHOUT COMPLICATION, WITH LONG-TERM CURRENT USE OF INSULIN (HCC): ICD-10-CM

## 2024-04-18 LAB
25(OH)D3 SERPL-MCNC: 36 NG/ML (ref 30–100)
ALBUMIN SERPL BCP-MCNC: 4.2 G/DL (ref 3.2–4.9)
ALBUMIN SERPL BCP-MCNC: 4.2 G/DL (ref 3.2–4.9)
ALBUMIN/GLOB SERPL: 1.2 G/DL
ALBUMIN/GLOB SERPL: 1.3 G/DL
ALP SERPL-CCNC: 62 U/L (ref 30–99)
ALP SERPL-CCNC: 62 U/L (ref 30–99)
ALT SERPL-CCNC: 13 U/L (ref 2–50)
ALT SERPL-CCNC: 13 U/L (ref 2–50)
ANION GAP SERPL CALC-SCNC: 12 MMOL/L (ref 7–16)
ANION GAP SERPL CALC-SCNC: 13 MMOL/L (ref 7–16)
APPEARANCE UR: CLEAR
AST SERPL-CCNC: 10 U/L (ref 12–45)
AST SERPL-CCNC: 11 U/L (ref 12–45)
BACTERIA #/AREA URNS HPF: NEGATIVE /HPF
BASOPHILS # BLD AUTO: 0.4 % (ref 0–1.8)
BASOPHILS # BLD: 0.04 K/UL (ref 0–0.12)
BILIRUB SERPL-MCNC: 0.2 MG/DL (ref 0.1–1.5)
BILIRUB SERPL-MCNC: 0.2 MG/DL (ref 0.1–1.5)
BILIRUB UR QL STRIP.AUTO: NEGATIVE
BUN SERPL-MCNC: 14 MG/DL (ref 8–22)
BUN SERPL-MCNC: 14 MG/DL (ref 8–22)
CALCIUM ALBUM COR SERPL-MCNC: 9.6 MG/DL (ref 8.5–10.5)
CALCIUM ALBUM COR SERPL-MCNC: 9.6 MG/DL (ref 8.5–10.5)
CALCIUM SERPL-MCNC: 9.8 MG/DL (ref 8.5–10.5)
CALCIUM SERPL-MCNC: 9.8 MG/DL (ref 8.5–10.5)
CHLORIDE SERPL-SCNC: 104 MMOL/L (ref 96–112)
CHLORIDE SERPL-SCNC: 105 MMOL/L (ref 96–112)
CHOLEST SERPL-MCNC: 118 MG/DL (ref 100–199)
CO2 SERPL-SCNC: 24 MMOL/L (ref 20–33)
CO2 SERPL-SCNC: 25 MMOL/L (ref 20–33)
COLOR UR: YELLOW
CREAT SERPL-MCNC: 0.98 MG/DL (ref 0.5–1.4)
CREAT SERPL-MCNC: 1.02 MG/DL (ref 0.5–1.4)
CREAT UR-MCNC: 170.82 MG/DL
EOSINOPHIL # BLD AUTO: 0.74 K/UL (ref 0–0.51)
EOSINOPHIL NFR BLD: 7.4 % (ref 0–6.9)
EPI CELLS #/AREA URNS HPF: NEGATIVE /HPF
ERYTHROCYTE [DISTWIDTH] IN BLOOD BY AUTOMATED COUNT: 46.1 FL (ref 35.9–50)
EST. AVERAGE GLUCOSE BLD GHB EST-MCNC: 232 MG/DL
GFR SERPLBLD CREATININE-BSD FMLA CKD-EPI: 79 ML/MIN/1.73 M 2
GFR SERPLBLD CREATININE-BSD FMLA CKD-EPI: 83 ML/MIN/1.73 M 2
GLOBULIN SER CALC-MCNC: 3.3 G/DL (ref 1.9–3.5)
GLOBULIN SER CALC-MCNC: 3.4 G/DL (ref 1.9–3.5)
GLUCOSE SERPL-MCNC: 226 MG/DL (ref 65–99)
GLUCOSE SERPL-MCNC: 226 MG/DL (ref 65–99)
GLUCOSE UR STRIP.AUTO-MCNC: 250 MG/DL
HBA1C MFR BLD: 9.7 % (ref 4–5.6)
HCT VFR BLD AUTO: 40.5 % (ref 42–52)
HDLC SERPL-MCNC: 41 MG/DL
HGB BLD-MCNC: 13.2 G/DL (ref 14–18)
HYALINE CASTS #/AREA URNS LPF: ABNORMAL /LPF
IMM GRANULOCYTES # BLD AUTO: 0.03 K/UL (ref 0–0.11)
IMM GRANULOCYTES NFR BLD AUTO: 0.3 % (ref 0–0.9)
KETONES UR STRIP.AUTO-MCNC: NEGATIVE MG/DL
LDLC SERPL CALC-MCNC: 53 MG/DL
LEUKOCYTE ESTERASE UR QL STRIP.AUTO: NEGATIVE
LYMPHOCYTES # BLD AUTO: 3.17 K/UL (ref 1–4.8)
LYMPHOCYTES NFR BLD: 31.6 % (ref 22–41)
MCH RBC QN AUTO: 29.2 PG (ref 27–33)
MCHC RBC AUTO-ENTMCNC: 32.6 G/DL (ref 32.3–36.5)
MCV RBC AUTO: 89.6 FL (ref 81.4–97.8)
MICRO URNS: ABNORMAL
MICROALBUMIN UR-MCNC: 4.5 MG/DL
MICROALBUMIN/CREAT UR: 26 MG/G (ref 0–30)
MONOCYTES # BLD AUTO: 0.66 K/UL (ref 0–0.85)
MONOCYTES NFR BLD AUTO: 6.6 % (ref 0–13.4)
NEUTROPHILS # BLD AUTO: 5.39 K/UL (ref 1.82–7.42)
NEUTROPHILS NFR BLD: 53.7 % (ref 44–72)
NITRITE UR QL STRIP.AUTO: NEGATIVE
NRBC # BLD AUTO: 0 K/UL
NRBC BLD-RTO: 0 /100 WBC (ref 0–0.2)
PH UR STRIP.AUTO: 5.5 [PH] (ref 5–8)
PLATELET # BLD AUTO: 249 K/UL (ref 164–446)
PMV BLD AUTO: 9.6 FL (ref 9–12.9)
POTASSIUM SERPL-SCNC: 4.2 MMOL/L (ref 3.6–5.5)
POTASSIUM SERPL-SCNC: 4.3 MMOL/L (ref 3.6–5.5)
PROT SERPL-MCNC: 7.5 G/DL (ref 6–8.2)
PROT SERPL-MCNC: 7.6 G/DL (ref 6–8.2)
PROT UR QL STRIP: NEGATIVE MG/DL
PSA SERPL-MCNC: 0.33 NG/ML (ref 0–4)
RBC # BLD AUTO: 4.52 M/UL (ref 4.7–6.1)
RBC # URNS HPF: ABNORMAL /HPF
RBC UR QL AUTO: ABNORMAL
SODIUM SERPL-SCNC: 141 MMOL/L (ref 135–145)
SODIUM SERPL-SCNC: 142 MMOL/L (ref 135–145)
SP GR UR STRIP.AUTO: 1.02
T4 FREE SERPL-MCNC: 1.46 NG/DL (ref 0.93–1.7)
TRIGL SERPL-MCNC: 119 MG/DL (ref 0–149)
TSH SERPL DL<=0.005 MIU/L-ACNC: 1.86 UIU/ML (ref 0.38–5.33)
UROBILINOGEN UR STRIP.AUTO-MCNC: 0.2 MG/DL
WBC # BLD AUTO: 10 K/UL (ref 4.8–10.8)
WBC #/AREA URNS HPF: ABNORMAL /HPF

## 2024-04-18 PROCEDURE — 85025 COMPLETE CBC W/AUTO DIFF WBC: CPT

## 2024-04-18 PROCEDURE — 85025 COMPLETE CBC W/AUTO DIFF WBC: CPT | Mod: 91

## 2024-04-18 PROCEDURE — 80053 COMPREHEN METABOLIC PANEL: CPT | Mod: 91

## 2024-04-18 PROCEDURE — 80061 LIPID PANEL: CPT

## 2024-04-18 PROCEDURE — 84443 ASSAY THYROID STIM HORMONE: CPT

## 2024-04-18 PROCEDURE — 84153 ASSAY OF PSA TOTAL: CPT | Mod: GA

## 2024-04-18 PROCEDURE — 80053 COMPREHEN METABOLIC PANEL: CPT

## 2024-04-18 PROCEDURE — 83036 HEMOGLOBIN GLYCOSYLATED A1C: CPT | Mod: GA

## 2024-04-18 PROCEDURE — 84439 ASSAY OF FREE THYROXINE: CPT

## 2024-04-18 PROCEDURE — 36415 COLL VENOUS BLD VENIPUNCTURE: CPT

## 2024-04-18 PROCEDURE — 82570 ASSAY OF URINE CREATININE: CPT

## 2024-04-18 PROCEDURE — 82043 UR ALBUMIN QUANTITATIVE: CPT

## 2024-04-18 PROCEDURE — 82306 VITAMIN D 25 HYDROXY: CPT | Mod: GA

## 2024-04-18 PROCEDURE — 81001 URINALYSIS AUTO W/SCOPE: CPT

## 2024-04-19 LAB
BASOPHILS # BLD AUTO: 0.5 % (ref 0–1.8)
BASOPHILS # BLD: 0.05 K/UL (ref 0–0.12)
EOSINOPHIL # BLD AUTO: 0.73 K/UL (ref 0–0.51)
EOSINOPHIL NFR BLD: 7.3 % (ref 0–6.9)
ERYTHROCYTE [DISTWIDTH] IN BLOOD BY AUTOMATED COUNT: 45.8 FL (ref 35.9–50)
HCT VFR BLD AUTO: 40.7 % (ref 42–52)
HGB BLD-MCNC: 13.3 G/DL (ref 14–18)
IMM GRANULOCYTES # BLD AUTO: 0.03 K/UL (ref 0–0.11)
IMM GRANULOCYTES NFR BLD AUTO: 0.3 % (ref 0–0.9)
LYMPHOCYTES # BLD AUTO: 3.14 K/UL (ref 1–4.8)
LYMPHOCYTES NFR BLD: 31.5 % (ref 22–41)
MCH RBC QN AUTO: 29.2 PG (ref 27–33)
MCHC RBC AUTO-ENTMCNC: 32.7 G/DL (ref 32.3–36.5)
MCV RBC AUTO: 89.3 FL (ref 81.4–97.8)
MONOCYTES # BLD AUTO: 0.66 K/UL (ref 0–0.85)
MONOCYTES NFR BLD AUTO: 6.6 % (ref 0–13.4)
NEUTROPHILS # BLD AUTO: 5.35 K/UL (ref 1.82–7.42)
NEUTROPHILS NFR BLD: 53.8 % (ref 44–72)
NRBC # BLD AUTO: 0 K/UL
NRBC BLD-RTO: 0 /100 WBC (ref 0–0.2)
PLATELET # BLD AUTO: 260 K/UL (ref 164–446)
PMV BLD AUTO: 9.8 FL (ref 9–12.9)
RBC # BLD AUTO: 4.56 M/UL (ref 4.7–6.1)
WBC # BLD AUTO: 10 K/UL (ref 4.8–10.8)

## 2024-05-21 ENCOUNTER — NON-PROVIDER VISIT (OUTPATIENT)
Dept: CARDIOLOGY | Facility: MEDICAL CENTER | Age: 69
End: 2024-05-21
Attending: INTERNAL MEDICINE
Payer: MEDICARE

## 2024-05-21 VITALS
WEIGHT: 150 LBS | DIASTOLIC BLOOD PRESSURE: 73 MMHG | HEART RATE: 82 BPM | SYSTOLIC BLOOD PRESSURE: 168 MMHG | BODY MASS INDEX: 26.58 KG/M2 | HEIGHT: 63 IN

## 2024-05-21 DIAGNOSIS — I50.21 ACUTE SYSTOLIC HEART FAILURE (HCC): ICD-10-CM

## 2024-05-21 RX ORDER — METOPROLOL SUCCINATE 50 MG/1
50 TABLET, EXTENDED RELEASE ORAL DAILY
Qty: 90 TABLET | Refills: 1 | Status: SHIPPED | OUTPATIENT
Start: 2024-05-21

## 2024-05-21 RX ORDER — LOSARTAN POTASSIUM 50 MG/1
50 TABLET ORAL DAILY
Qty: 90 TABLET | Refills: 1 | Status: SHIPPED | OUTPATIENT
Start: 2024-05-21

## 2024-05-21 RX ORDER — EMPAGLIFLOZIN 10 MG/1
10 TABLET, FILM COATED ORAL DAILY
Qty: 90 TABLET | Refills: 1 | Status: SHIPPED | OUTPATIENT
Start: 2024-05-21 | End: 2024-05-29 | Stop reason: SDUPTHER

## 2024-05-21 ASSESSMENT — FIBROSIS 4 INDEX: FIB4 SCORE: 0.86

## 2024-05-21 NOTE — PROGRESS NOTES
Patient Consult Note     Utilized translation services for this encounter.  Language Line - [phone]  [facility] ID - [id number]   [place]  ID # -    Primary care physician: Pcp Pt States None    Reason for consult: Management of Uncontrolled Type 2 Diabetes    HPI:  Pac Keith is a 67 y.o. old patient who comes in today for evaluation of above stated problem.       04/03/22 11:18 10/11/22 17:00   Left Ventrical Ejection Fraction 20 55       Allergies:  Patient has no known allergies.    Most Recent labs, A1c, and immunizations:    Lab Results   Component Value Date/Time    HBA1C 9.7 (H) 04/18/2024 08:20 AM       Latest Reference Range & Units 01/22/24 10:09 04/18/24 08:20   Glycohemoglobin 4.0 - 5.6 % 11.1 (H) 9.7 (H)   (H): Data is abnormally high    Lab Results   Component Value Date/Time    CHOLSTRLTOT 118 04/18/2024 08:20 AM    LDL 53 04/18/2024 08:20 AM    HDL 41 04/18/2024 08:20 AM    TRIGLYCERIDE 119 04/18/2024 08:20 AM       Lab Results   Component Value Date/Time    SODIUM 142 04/18/2024 08:20 AM    POTASSIUM 4.3 04/18/2024 08:20 AM    CHLORIDE 105 04/18/2024 08:20 AM    CO2 24 04/18/2024 08:20 AM    GLUCOSE 226 (H) 04/18/2024 08:20 AM    BUN 14 04/18/2024 08:20 AM    CREATININE 0.98 04/18/2024 08:20 AM    CREATININE 0.9 12/29/2005 04:08 PM     Lab Results   Component Value Date/Time    ALKPHOSPHAT 62 04/18/2024 08:20 AM    ASTSGOT 11 (L) 04/18/2024 08:20 AM    ALTSGPT 13 04/18/2024 08:20 AM    TBILIRUBIN 0.2 04/18/2024 08:20 AM    INR 1.11 07/17/2017 09:31 AM    ALBUMIN 4.2 04/18/2024 08:20 AM      Lab Results   Component Value Date/Time    MALBCRT 26 04/18/2024 08:20 AM    MICROALBUR 4.5 04/18/2024 08:20 AM     Immunization History   Administered Date(s) Administered    Hepatitis B Vaccine (Adol/Adult) 03/13/2020    Influenza (IM) Preservative Free - HISTORICAL DATA 11/19/2015    Influenza Seasonal Injectable - Historical Data 10/08/2013, 04/09/2015    Influenza Vaccine Adult HD 08/21/2020,  "12/19/2022    Influenza Vaccine Quad Inj (Pf) 10/12/2018, 10/04/2019    Influenza Vaccine Quad Inj (Preserved) 10/27/2016    PFIZER PURPLE CAP SARS-COV-2 VACCINATION (12+) 05/05/2021, 05/26/2021    Pneumococcal polysaccharide vaccine (PPSV-23) 08/21/2020, 04/14/2022    Tdap Vaccine 08/21/2020    Zoster Vaccine Recombinant (RZV) (SHINGRIX) 08/21/2020, 01/26/2021       Physical Examination:  Vital signs: BP (!) 168/73   Pulse 82   Ht 1.6 m (5' 3\")   Wt 68 kg (150 lb)   BMI 26.57 kg/m²  Body mass index is 26.57 kg/m².    Change in weight: Stable  Exercise habits: no regular exercise program   Diet: common adult    Medications:    Current Outpatient Medications:     losartan, 50 mg, Oral, DAILY    metoprolol SR, 50 mg, Oral, DAILY    clopidogrel, 75 mg, Oral, DAILY    FreeStyle Lita 2 [place], 1 Each, Does not apply, DAILY    FreeStyle Lita 2 Sensor, 1 Each, Topical, Q14 DAYS    rosuvastatin, 40 mg, Oral, Q EVENING    spironolactone, 25 mg, Oral, DAILY    furosemide, 10 mg, Oral, QDAY    Lantus SoloStar, 35 Units, Subcutaneous, Q EVENING (Patient taking differently: 42 Units, Subcutaneous, EVERY EVENING, This rx was submitted by a pharmacist working under a collaborative practice agreement.)    metFORMIN ER, 1,000 mg, Oral, BID    insulin lispro, 15 Units, Subcutaneous, TID AC    Jardiance, 10 mg, Oral, DAILY    Pen Needles, Use with lantus and humalog, use at least 2 per day. May change per pt preference.    Aspirin Low Dose, TAKE 1 TABLET BY MOUTH EVERY DAY    ONE TOUCH ULTRA 2, CHECK BLOOD SUGAR 1 TIME A DAY AND AS NEEDED    OneTouch Verio, CHECK BLOOD SUGAR ONE-TWO TIME PER DAY AND AS NEEDED FOR HIGH OR LOW SUGAR    Lancets, Per insurance coverage. Check blood sugar 1x per day and prn ssx of high or low sugar    [facility] Prep, PER INSURANCE COVERAGE. WIPE SITE WITH PREP PAD PRIOR TO INJECTION    vitamin D, 2,000 Units, Oral, DAILY      Assessment and Plan:  1. DM2   Translation line utilized for visit.   Most recent " A1c is: above goal, but better than last appointment  Due for retinal scan due to some vision issues   FBS -  sometimes he test after meals, we talked about timing of meals and testing.   BS before meals - 120  Start to test 2-3 hrs after dinner, to determine if we need to change his dose   Referral to endo   Kidney function:  Stable     Medication(s) recommended:   Continue metformin 1000 mg twice daily   Increase Lantus to 42 units at night   Continue Humalog to 15 units before lunch/dinner.     Start Jardiance 10mg daily, will send a note to [employer] for help with coverage   We did submit paperwork for patient assistance, but have not received notification    Consider GLP1 at next appt, he will need PAP for this due to cost.     Actos -  contraindicated in CHF    Long-acting insulin:   Adjust dose according to FASTING BLOOD GLUCOSE target 100-130 mg/dL  (1) Increase the insulin dose every 3-4 days as needed.   (2) Increase by 1 units if FBG average concentration is 131-170 mg/dL.   (3) Increase by 2 units if FBG average concentration is 171-210 mg/dL.   (4) Increase by 4 units if FBG average concentration is 221-260 mg/dL.   (5) Increase by 6 units if FBG average concentration is greater than 261mg/dL and call us.   Consider cutting back by 1-2 units to previous dose if glucose concentration is below 100 mg/dL or symptoms of hypoglycemia.      (1) Rapid-acting insulin  Glucose reading  Change to be made to insulin dose    < 80 mg/dL  Subtract 1 unit    100-130 mg/dL  15 units.   (fixed or based on carbohydrate intake)    130-170 mg/dL  Add 1 unit    171-215 mg/dL  Add 2 units    216-260 mg/dL  Add 3 units       -Blood glucose and A1c target        2.  Cardiovascular  Is LDL <100: y  Is Blood pressure <130/80:  n  Medication(s) recommended.   Increase to losartan 50mg daily   Increase to metoprolol 50mg daily   Continue Spironolactone 25mg   Continue lasix 20mg 1/2 tab bid     3.  Lifestyle changes   Focus on  eating a DASH/Mediterranean-style diet.   Exercise 30 minutes daily at least 5 days/week, as tolerated.  https://www.niddk.nih.gov/bwp      Follow-up appointment in 3 week(s)    Levy Scherer, PharmD, MS, BCACP, [facility]  Phone [phone] fax [phone]    This note was created using voice recognition software (Dragon). The accuracy of the dictation is limited by the abilities of the software. I have reviewed the note prior to signing, however some errors in grammar and context are still possible. If you have any questions related to this note please do not hesitate to contact our office.     CC:   Pcp Pt States None  No ref. provider found  Dr. Gary Mcfarlane

## 2024-05-24 DIAGNOSIS — I50.21 ACUTE SYSTOLIC HEART FAILURE (HCC): ICD-10-CM

## 2024-05-24 RX ORDER — CLOPIDOGREL BISULFATE 75 MG/1
75 TABLET ORAL DAILY
Qty: 30 TABLET | Refills: 0 | Status: SHIPPED | OUTPATIENT
Start: 2024-05-24

## 2024-05-24 NOTE — TELEPHONE ENCOUNTER
Is the patient due for a refill? Yes    Was the patient seen the past year? No    Date of last office visit: 10/14/22    Does the patient have an upcoming appointment?  No    Provider to refill:BE    Does the patients insurance require a 100 day supply?  No

## 2024-05-29 ENCOUNTER — TELEPHONE (OUTPATIENT)
Dept: CARDIOLOGY | Facility: MEDICAL CENTER | Age: 69
End: 2024-05-29
Payer: MEDICARE

## 2024-05-29 DIAGNOSIS — I50.21 ACUTE SYSTOLIC HEART FAILURE (HCC): ICD-10-CM

## 2024-05-29 RX ORDER — EMPAGLIFLOZIN 10 MG/1
10 TABLET, FILM COATED ORAL DAILY
Qty: 90 TABLET | Refills: 1 | Status: SHIPPED | OUTPATIENT
Start: 2024-05-29

## 2024-05-29 NOTE — TELEPHONE ENCOUNTER
Isaiah Caraballo I please get a rx with DR. Vigil on the rx for pt Jardiance. The RX has Lali on it.  
Order changed at this time  
[FreeTextEntry1] : 64F w HLD, hx of breast CA presents to establish care Sent in by PMD: Dr Vasquez  pt endorsing chest heaviness, intermittent symptoms, 3 times a week. mostly on exertion. states when she goes for her walks she has difficulty breathing. pt states she doesnt stop, she just pushes through the symptoms. pt does endorse decreased exercise tolerance as well. +sob, and diaphresis. no dizziness or syncope.   Denies palpitations, dizziness, LE edema, orthopnea  Exercise: walking daily Diet: none  Prev cardiac history: none Previous cardiac testing: none Recent labs:   EKG: SR poor r wave progression   Med hx: breast CA s/p sx, and RT OBGYN hx:  no children. no Hx of miscarriage. Currently post menopause. Sx hx: hernia, breast sx x3, d and c, lipoma excision 	 Family hx: no known cardiac hx Social hx:  lives in [place], with significant other, feels safe at home. , part time. no tob/etoh/drugs Meds: vitamins Allergies: nkda

## 2024-06-04 ENCOUNTER — TELEPHONE (OUTPATIENT)
Dept: CARDIOLOGY | Facility: MEDICAL CENTER | Age: 69
End: 2024-06-04
Payer: MEDICARE

## 2024-06-04 NOTE — TELEPHONE ENCOUNTER
Attempted to contact patient at [phone] to discuss [facility] and services/benefits offered. No answer, left voice    Had the  JORDAN for pt to give me a call back to help with copay. Pt copay is $469.54. I need pt consent to try to sign them up for the manuel.

## 2024-06-11 ENCOUNTER — NON-PROVIDER VISIT (OUTPATIENT)
Dept: CARDIOLOGY | Facility: MEDICAL CENTER | Age: 69
End: 2024-06-11
Attending: NURSE PRACTITIONER
Payer: MEDICARE

## 2024-06-11 DIAGNOSIS — Z79.4 TYPE 2 DIABETES MELLITUS WITHOUT COMPLICATION, WITH LONG-TERM CURRENT USE OF INSULIN (HCC): ICD-10-CM

## 2024-06-11 DIAGNOSIS — E11.9 TYPE 2 DIABETES MELLITUS WITHOUT COMPLICATION, WITH LONG-TERM CURRENT USE OF INSULIN (HCC): ICD-10-CM

## 2024-06-11 PROCEDURE — 99212 OFFICE O/P EST SF 10 MIN: CPT

## 2024-06-11 RX ORDER — DAPAGLIFLOZIN 10 MG/1
10 TABLET, FILM COATED ORAL DAILY
Qty: 28 TABLET | Refills: 0 | Status: SHIPPED | OUTPATIENT
Start: 2024-06-11

## 2024-06-11 NOTE — PROGRESS NOTES
Patient Consult Note     Utilized translation services for this encounter.  Language Line - [phone]  [facility] ID - [id number]   [place]  ID # -    Primary care physician: Pcp Pt States None    Reason for consult: Management of Uncontrolled Type 2 Diabetes    HPI:  Pac Keith is a 67 y.o. old patient who comes in today for evaluation of above stated problem.       04/03/22 11:18 10/11/22 17:00   Left Ventrical Ejection Fraction 20 55       Allergies:  Patient has no known allergies.    Most Recent labs, A1c, and immunizations:    Lab Results   Component Value Date/Time    HBA1C 9.7 (H) 04/18/2024 08:20 AM       Latest Reference Range & Units 01/22/24 10:09 04/18/24 08:20   Glycohemoglobin 4.0 - 5.6 % 11.1 (H) 9.7 (H)   (H): Data is abnormally high    Lab Results   Component Value Date/Time    CHOLSTRLTOT 118 04/18/2024 08:20 AM    LDL 53 04/18/2024 08:20 AM    HDL 41 04/18/2024 08:20 AM    TRIGLYCERIDE 119 04/18/2024 08:20 AM       Lab Results   Component Value Date/Time    SODIUM 142 04/18/2024 08:20 AM    POTASSIUM 4.3 04/18/2024 08:20 AM    CHLORIDE 105 04/18/2024 08:20 AM    CO2 24 04/18/2024 08:20 AM    GLUCOSE 226 (H) 04/18/2024 08:20 AM    BUN 14 04/18/2024 08:20 AM    CREATININE 0.98 04/18/2024 08:20 AM    CREATININE 0.9 12/29/2005 04:08 PM     Lab Results   Component Value Date/Time    ALKPHOSPHAT 62 04/18/2024 08:20 AM    ASTSGOT 11 (L) 04/18/2024 08:20 AM    ALTSGPT 13 04/18/2024 08:20 AM    TBILIRUBIN 0.2 04/18/2024 08:20 AM    INR 1.11 07/17/2017 09:31 AM    ALBUMIN 4.2 04/18/2024 08:20 AM      Lab Results   Component Value Date/Time    MALBCRT 26 04/18/2024 08:20 AM    MICROALBUR 4.5 04/18/2024 08:20 AM     Immunization History   Administered Date(s) Administered    Hepatitis B Vaccine (Adol/Adult) 03/13/2020    Influenza (IM) Preservative Free - HISTORICAL DATA 11/19/2015    Influenza Seasonal Injectable - Historical Data 10/08/2013, 04/09/2015    Influenza Vaccine Adult HD 08/21/2020,  12/19/2022    Influenza Vaccine Quad Inj (Pf) 10/12/2018, 10/04/2019    Influenza Vaccine Quad Inj (Preserved) 10/27/2016    PFIZER PURPLE CAP SARS-COV-2 VACCINATION (12+) 05/05/2021, 05/26/2021    Pneumococcal polysaccharide vaccine (PPSV-23) 08/21/2020, 04/14/2022    Tdap Vaccine 08/21/2020    Zoster Vaccine Recombinant (RZV) (SHINGRIX) 08/21/2020, 01/26/2021       Physical Examination:  Vital signs: There were no vitals taken for this visit. There is no height or weight on file to calculate BMI.    Change in weight: Stable  Exercise habits: no regular exercise program   Diet: common adult    Medications:    Current Outpatient Medications:     Jardiance, 10 mg, Oral, DAILY    clopidogrel, 75 mg, Oral, DAILY    losartan, 50 mg, Oral, DAILY    metoprolol SR, 50 mg, Oral, DAILY    FreeStyle Lita 2 [place], 1 Each, Does not apply, DAILY    FreeStyle Lita 2 Sensor, 1 Each, Topical, Q14 DAYS    rosuvastatin, 40 mg, Oral, Q EVENING    spironolactone, 25 mg, Oral, DAILY    furosemide, 10 mg, Oral, QDAY    Lantus SoloStar, 35 Units, Subcutaneous, Q EVENING (Patient taking differently: 42 Units, Subcutaneous, EVERY EVENING, This rx was submitted by a pharmacist working under a collaborative practice agreement.)    metFORMIN ER, 1,000 mg, Oral, BID    insulin lispro, 15 Units, Subcutaneous, TID AC    Pen Needles, Use with lantus and humalog, use at least 2 per day. May change per pt preference.    Aspirin Low Dose, TAKE 1 TABLET BY MOUTH EVERY DAY    ONE TOUCH ULTRA 2, CHECK BLOOD SUGAR 1 TIME A DAY AND AS NEEDED    OneTouch Verio, CHECK BLOOD SUGAR ONE-TWO TIME PER DAY AND AS NEEDED FOR HIGH OR LOW SUGAR    Lancets, Per insurance coverage. Check blood sugar 1x per day and prn ssx of high or low sugar    CVS Prep, PER INSURANCE COVERAGE. WIPE SITE WITH PREP PAD PRIOR TO INJECTION    vitamin D, 2,000 Units, Oral, DAILY      Assessment and Plan:  1. DM2   Translation line utilized for visit.   Most recent A1c is: above goal,  but better than last appointment  Due for retinal scan due to some vision issues   FBS -  sometimes he test after meals, we talked about timing of meals and testing.   BS before meals - 120  Start to test 2-3 hrs after dinner, to determine if we need to change his dose   Referral to endo   Kidney function:  Stable     Medication(s) recommended:   Continue metformin 1000 mg twice daily   Increase Lantus to 42 units at night   Continue Humalog to 15 units before lunch/dinner.  Start samples of Farxiga 10mg daily     Also sent in PAP today      Actos -  contraindicated in CHF    Long-acting insulin:   Adjust dose according to FASTING BLOOD GLUCOSE target 100-130 mg/dL  (1) Increase the insulin dose every 3-4 days as needed.   (2) Increase by 1 units if FBG average concentration is 131-170 mg/dL.   (3) Increase by 2 units if FBG average concentration is 171-210 mg/dL.   (4) Increase by 4 units if FBG average concentration is 221-260 mg/dL.   (5) Increase by 6 units if FBG average concentration is greater than 261mg/dL and call us.   Consider cutting back by 1-2 units to previous dose if glucose concentration is below 100 mg/dL or symptoms of hypoglycemia.      (1) Rapid-acting insulin  Glucose reading  Change to be made to insulin dose    < 80 mg/dL  Subtract 1 unit    100-130 mg/dL  15 units.   (fixed or based on carbohydrate intake)    130-170 mg/dL  Add 1 unit    171-215 mg/dL  Add 2 units    216-260 mg/dL  Add 3 units       -Blood glucose and A1c target        2.  Cardiovascular  Is LDL <100: y  Is Blood pressure <130/80:  n  Medication(s) recommended.   Increase to losartan 50mg daily   Increase to metoprolol 50mg daily   Continue Spironolactone 25mg   Continue lasix 20mg 1/2 tab bid     3.  Lifestyle changes   Focus on eating a DASH/Mediterranean-style diet.   Exercise 30 minutes daily at least 5 days/week, as tolerated.  https://www.niddk.nih.gov/bwp      Follow-up appointment in 8 week(s)    Levy Scherer,  PharmD, MS, BCACP, C  [facility]  Phone [phone] fax [phone]    This note was created using voice recognition software (Dragon). The accuracy of the dictation is limited by the abilities of the software. I have reviewed the note prior to signing, however some errors in grammar and context are still possible. If you have any questions related to this note please do not hesitate to contact our office.     CC:   Pcp Pt States None  No ref. provider found  Dr. Gary Mcfarlane

## 2024-06-12 ENCOUNTER — DOCUMENTATION (OUTPATIENT)
Dept: MEDICAL GROUP | Facility: PHYSICIAN GROUP | Age: 69
End: 2024-06-12
Payer: MEDICARE

## 2024-06-17 ENCOUNTER — TELEPHONE (OUTPATIENT)
Dept: CARDIOLOGY | Facility: MEDICAL CENTER | Age: 69
End: 2024-06-17
Payer: MEDICARE

## 2024-06-19 DIAGNOSIS — I50.21 ACUTE SYSTOLIC HEART FAILURE (HCC): ICD-10-CM

## 2024-06-19 RX ORDER — CLOPIDOGREL BISULFATE 75 MG/1
75 TABLET ORAL DAILY
Qty: 30 TABLET | Refills: 0 | OUTPATIENT
Start: 2024-06-19

## 2024-07-16 ENCOUNTER — HOSPITAL ENCOUNTER (OUTPATIENT)
Dept: LAB | Facility: MEDICAL CENTER | Age: 69
End: 2024-07-16
Attending: NURSE PRACTITIONER
Payer: MEDICARE

## 2024-07-16 DIAGNOSIS — E11.9 TYPE 2 DIABETES MELLITUS WITHOUT COMPLICATION, WITH LONG-TERM CURRENT USE OF INSULIN (HCC): ICD-10-CM

## 2024-07-16 DIAGNOSIS — Z79.4 TYPE 2 DIABETES MELLITUS WITHOUT COMPLICATION, WITH LONG-TERM CURRENT USE OF INSULIN (HCC): ICD-10-CM

## 2024-07-16 LAB
ANION GAP SERPL CALC-SCNC: 12 MMOL/L (ref 7–16)
BUN SERPL-MCNC: 16 MG/DL (ref 8–22)
CALCIUM SERPL-MCNC: 9.7 MG/DL (ref 8.5–10.5)
CHLORIDE SERPL-SCNC: 97 MMOL/L (ref 96–112)
CO2 SERPL-SCNC: 24 MMOL/L (ref 20–33)
CREAT SERPL-MCNC: 1.22 MG/DL (ref 0.5–1.4)
EST. AVERAGE GLUCOSE BLD GHB EST-MCNC: 220 MG/DL
GFR SERPLBLD CREATININE-BSD FMLA CKD-EPI: 64 ML/MIN/1.73 M 2
GLUCOSE SERPL-MCNC: 228 MG/DL (ref 65–99)
HBA1C MFR BLD: 9.3 % (ref 4–5.6)
POTASSIUM SERPL-SCNC: 4.2 MMOL/L (ref 3.6–5.5)
SODIUM SERPL-SCNC: 133 MMOL/L (ref 135–145)

## 2024-07-16 PROCEDURE — 80048 BASIC METABOLIC PNL TOTAL CA: CPT

## 2024-07-16 PROCEDURE — 36415 COLL VENOUS BLD VENIPUNCTURE: CPT

## 2024-07-16 PROCEDURE — 83036 HEMOGLOBIN GLYCOSYLATED A1C: CPT | Mod: GA

## 2024-07-19 ENCOUNTER — TELEPHONE (OUTPATIENT)
Dept: CARDIOLOGY | Facility: MEDICAL CENTER | Age: 69
End: 2024-07-19
Payer: MEDICARE

## 2024-08-22 ENCOUNTER — NON-PROVIDER VISIT (OUTPATIENT)
Dept: CARDIOLOGY | Facility: MEDICAL CENTER | Age: 69
End: 2024-08-22
Attending: NURSE PRACTITIONER
Payer: MEDICARE

## 2024-08-22 VITALS — WEIGHT: 149 LBS | BODY MASS INDEX: 26.39 KG/M2

## 2024-08-22 DIAGNOSIS — I50.21 ACUTE SYSTOLIC HEART FAILURE (HCC): ICD-10-CM

## 2024-08-22 DIAGNOSIS — E11.9 TYPE 2 DIABETES MELLITUS WITHOUT COMPLICATION, WITH LONG-TERM CURRENT USE OF INSULIN (HCC): ICD-10-CM

## 2024-08-22 DIAGNOSIS — Z79.4 TYPE 2 DIABETES MELLITUS WITHOUT COMPLICATION, WITH LONG-TERM CURRENT USE OF INSULIN (HCC): ICD-10-CM

## 2024-08-22 DIAGNOSIS — E11.42 DIABETIC POLYNEUROPATHY ASSOCIATED WITH TYPE 2 DIABETES MELLITUS (HCC): ICD-10-CM

## 2024-08-22 PROCEDURE — 99212 OFFICE O/P EST SF 10 MIN: CPT | Performed by: INTERNAL MEDICINE

## 2024-08-22 RX ORDER — EMPAGLIFLOZIN 10 MG/1
10 TABLET, FILM COATED ORAL DAILY
Qty: 30 TABLET | Refills: 11 | Status: SHIPPED | OUTPATIENT
Start: 2024-08-22 | End: 2024-08-27 | Stop reason: SDUPTHER

## 2024-08-22 RX ORDER — METFORMIN HCL 500 MG
1000 TABLET, EXTENDED RELEASE 24 HR ORAL 2 TIMES DAILY
Qty: 360 TABLET | Refills: 1 | Status: SHIPPED | OUTPATIENT
Start: 2024-08-22 | End: 2024-08-27 | Stop reason: SDUPTHER

## 2024-08-22 RX ORDER — INSULIN GLARGINE 100 [IU]/ML
50 INJECTION, SOLUTION SUBCUTANEOUS EVERY EVENING
Qty: 15 ML | Refills: 6 | Status: SHIPPED | OUTPATIENT
Start: 2024-08-22 | End: 2024-08-27 | Stop reason: SDUPTHER

## 2024-08-22 RX ORDER — PEN NEEDLE, DIABETIC 33 GX5/32"
NEEDLE, DISPOSABLE MISCELLANEOUS
Qty: 100 EACH | Refills: 99 | Status: SHIPPED | OUTPATIENT
Start: 2024-08-22 | End: 2024-08-27 | Stop reason: SDUPTHER

## 2024-08-22 RX ORDER — INSULIN LISPRO 100 [IU]/ML
17 INJECTION, SOLUTION INTRAVENOUS; SUBCUTANEOUS
Qty: 15 ML | Refills: 6 | Status: SHIPPED | OUTPATIENT
Start: 2024-08-22 | End: 2024-08-27 | Stop reason: SDUPTHER

## 2024-08-22 ASSESSMENT — FIBROSIS 4 INDEX: FIB4 SCORE: 0.86

## 2024-08-22 NOTE — PROGRESS NOTES
Patient Consult Note    Primary care physician: Pcp Pt States None    Reason for consult: Management of Uncontrolled Type 2 Diabetes    Utilized translation services for this encounter.  Language Line - [phone]  [facility] ID - [id number]   [place], ID # [id number]    HPI:  Chucky Keith is a 68 y.o. old patient who comes in today for evaluation of above stated problem.    Allergies  Patient has no known allergies.    Current Diabetes Medication Regimen  Metformin ER: 1000 mg BID  SGLT-2 Inhibitor: Jardiance 10 mg once daily - pt reports that he did not p/u Farxiga samples    Basal Insulin: Glargine 50 units QHS  Prandial Insulin: Humalog 17 units TID per SS    Previous Diabetes Medications and Reason for Discontinuation  N/a    Potential Barriers to Care:  Adherence: Unclear  Side effects: none  Affordability: C/o insulin copay    SMBG  Pt has home glucometer and proper testing technique - Yes  Discussed BG Goals: FBG 80 - 130, 2hPP < 180, A1c < 7%    Pt reports blood sugars:   Before Breakfast: 108, 104 this AM, occasionally in the 200-300's (states this is when he eats rice and noodles)    Hypoglycemia  Hypoglycemia awareness: Yes  Nocturnal hypoglycemia: None  Hypoglycemia:  None  Pt's treatment of Hypoglycemia  Discussed 15:15 Rule    Lifestyle  Did not address today    Preventative Management  BP regimen (ACEi/ARB): Losartan 50 mg once daily  Statin: rosuvastatin (Crestor) 40 mg daily  Last Microalbumin/Cr:  Lab Results   Component Value Date/Time    MALBCRT 26 04/18/2024 08:20 AM    MICROALBUR 4.5 04/18/2024 08:20 AM     Last A1c:  Lab Results   Component Value Date/Time    HBA1C 9.3 (H) 07/16/2024 07:24 AM      Last Retinal Scan: Completed 3/2024    Monofilament exam: Completed today in clinic  Monofilament testing with a 10 gram force: sensation intact: decreased bilaterally.    Visual Inspection: Feet without maceration, ulcers, fissures.  Feet dry.  Pedal pulses: intact bilaterally      Labs  Lab  Results   Component Value Date/Time    SODIUM 133 (L) 07/16/2024 07:24 AM    POTASSIUM 4.2 07/16/2024 07:24 AM    CHLORIDE 97 07/16/2024 07:24 AM    CO2 24 07/16/2024 07:24 AM    GLUCOSE 228 (H) 07/16/2024 07:24 AM    BUN 16 07/16/2024 07:24 AM    CREATININE 1.22 07/16/2024 07:24 AM    CREATININE 0.9 12/29/2005 04:08 PM     Lab Results   Component Value Date/Time    ALKPHOSPHAT 62 04/18/2024 08:20 AM    ASTSGOT 11 (L) 04/18/2024 08:20 AM    ALTSGPT 13 04/18/2024 08:20 AM    TBILIRUBIN 0.2 04/18/2024 08:20 AM    INR 1.11 07/17/2017 09:31 AM    ALBUMIN 4.2 04/18/2024 08:20 AM        Physical Examination:  Vital signs: Wt 67.6 kg (149 lb)   BMI 26.39 kg/m²  Body mass index is 26.39 kg/m².    Assessment and Plan:    1. DM2  Since last visit, pt was Rx'd Farxiga for PAP, but his nahomi was denied d/t exceeding the income requirements.  Of note, pt states he does not know the name of any of his medications. He states he just takes the medications from the pill bottles. Pt states he's been out of all of his medications and has not been taking them, but then later stated he was using insulin.  He c/o issues filling medications since last visit. When asked for the name of the pharmacy that he is trying to obtain his medications from, pt exclaimed that he does not know the name of the pharmacy. He believes [facility] of [facility], so [facility]  Discussed Goals: FBG 80 - 130, 2hPP < 180, a1c < 7%   Most recent a1c drawn on 7/2024 was 9.3%, which is not at goal and decreased from last (9.7% on 4/2024).  Pt reported FBG are sometimes at goal or significantly elevated pending his diet. Recommended avoidance of high carb foods.  I refilled pt's DM medications as currently Rx'd as he c/o procurement issues from the pharmacy.   Requested pt to bring all of his DM medications to the next appt so we can see which medications he's actually taking and how he's taking them.  Pt would likely benefit from optimization of  SGLT2i, addition of a GLP1, and less prandial insulin use.  Once we have an accurate medication list I think pt would benefit from working w/ cards Rx coordinators regarding medication affordability.    - Medication changes:  None today d/t lack of information  - Continue:  Current DM regimen     - Lifestyle changes:  Diet: Maximize lean proteins and veggies. Cut out/down on carbs. Avoid simple sugars.   Exercise: Increase as tolerated    - Preventative management:  REC DM Score: N/a  Care gaps addressed: N/a  Care gaps updated in Health Maintenance    Follow Up:  1 weeks    William Epstein, PharmD, BCACP    CC:   Pcp Pt States None

## 2024-08-27 ENCOUNTER — OFFICE VISIT (OUTPATIENT)
Dept: CARDIOLOGY | Facility: MEDICAL CENTER | Age: 69
End: 2024-08-27
Attending: NURSE PRACTITIONER
Payer: MEDICARE

## 2024-08-27 VITALS
WEIGHT: 153 LBS | SYSTOLIC BLOOD PRESSURE: 126 MMHG | BODY MASS INDEX: 27.11 KG/M2 | RESPIRATION RATE: 16 BRPM | DIASTOLIC BLOOD PRESSURE: 54 MMHG | HEART RATE: 82 BPM | HEIGHT: 63 IN | OXYGEN SATURATION: 98 %

## 2024-08-27 VITALS
SYSTOLIC BLOOD PRESSURE: 133 MMHG | BODY MASS INDEX: 26.4 KG/M2 | HEART RATE: 93 BPM | HEIGHT: 63 IN | WEIGHT: 149 LBS | DIASTOLIC BLOOD PRESSURE: 67 MMHG

## 2024-08-27 DIAGNOSIS — I25.10 CORONARY ARTERY DISEASE INVOLVING NATIVE HEART WITHOUT ANGINA PECTORIS, UNSPECIFIED VESSEL OR LESION TYPE: ICD-10-CM

## 2024-08-27 DIAGNOSIS — Z79.4 TYPE 2 DIABETES MELLITUS WITHOUT COMPLICATION, WITH LONG-TERM CURRENT USE OF INSULIN (HCC): ICD-10-CM

## 2024-08-27 DIAGNOSIS — I50.20 ACC/AHA STAGE C SYSTOLIC HEART FAILURE (HCC): ICD-10-CM

## 2024-08-27 DIAGNOSIS — E11.42 DIABETIC POLYNEUROPATHY ASSOCIATED WITH TYPE 2 DIABETES MELLITUS (HCC): ICD-10-CM

## 2024-08-27 DIAGNOSIS — Z79.899 HIGH RISK MEDICATION USE: ICD-10-CM

## 2024-08-27 DIAGNOSIS — E11.9 TYPE 2 DIABETES MELLITUS WITHOUT COMPLICATION, WITH LONG-TERM CURRENT USE OF INSULIN (HCC): ICD-10-CM

## 2024-08-27 DIAGNOSIS — E78.5 DYSLIPIDEMIA: ICD-10-CM

## 2024-08-27 DIAGNOSIS — I50.21 ACUTE SYSTOLIC HEART FAILURE (HCC): ICD-10-CM

## 2024-08-27 DIAGNOSIS — I25.5 ISCHEMIC CARDIOMYOPATHY: ICD-10-CM

## 2024-08-27 DIAGNOSIS — I10 ESSENTIAL HYPERTENSION: ICD-10-CM

## 2024-08-27 DIAGNOSIS — I10 ESSENTIAL HYPERTENSION, BENIGN: ICD-10-CM

## 2024-08-27 DIAGNOSIS — Z71.6 ENCOUNTER FOR TOBACCO USE CESSATION COUNSELING: ICD-10-CM

## 2024-08-27 PROCEDURE — 99212 OFFICE O/P EST SF 10 MIN: CPT

## 2024-08-27 PROCEDURE — 99213 OFFICE O/P EST LOW 20 MIN: CPT | Performed by: NURSE PRACTITIONER

## 2024-08-27 RX ORDER — METOPROLOL SUCCINATE 50 MG/1
50 TABLET, EXTENDED RELEASE ORAL DAILY
Qty: 90 TABLET | Refills: 1 | Status: SHIPPED | OUTPATIENT
Start: 2024-08-27

## 2024-08-27 RX ORDER — FUROSEMIDE 20 MG
10 TABLET ORAL
Qty: 45 TABLET | Refills: 1 | Status: SHIPPED | OUTPATIENT
Start: 2024-08-27

## 2024-08-27 RX ORDER — INSULIN GLARGINE 100 [IU]/ML
50 INJECTION, SOLUTION SUBCUTANEOUS EVERY EVENING
Qty: 45 ML | Refills: 1 | Status: SHIPPED | OUTPATIENT
Start: 2024-08-27

## 2024-08-27 RX ORDER — EMPAGLIFLOZIN 10 MG/1
10 TABLET, FILM COATED ORAL DAILY
Qty: 30 TABLET | Refills: 6 | Status: SHIPPED | OUTPATIENT
Start: 2024-08-27

## 2024-08-27 RX ORDER — PEN NEEDLE, DIABETIC 33 GX5/32"
NEEDLE, DISPOSABLE MISCELLANEOUS
Qty: 100 EACH | Refills: 99 | Status: SHIPPED | OUTPATIENT
Start: 2024-08-27

## 2024-08-27 RX ORDER — INSULIN LISPRO 100 [IU]/ML
17 INJECTION, SOLUTION INTRAVENOUS; SUBCUTANEOUS
Qty: 45 ML | Refills: 1 | Status: SHIPPED | OUTPATIENT
Start: 2024-08-27

## 2024-08-27 RX ORDER — METFORMIN HCL 500 MG
1000 TABLET, EXTENDED RELEASE 24 HR ORAL 2 TIMES DAILY
Qty: 360 TABLET | Refills: 1 | Status: SHIPPED | OUTPATIENT
Start: 2024-08-27

## 2024-08-27 RX ORDER — LOSARTAN POTASSIUM 50 MG/1
50 TABLET ORAL DAILY
Qty: 90 TABLET | Refills: 1 | Status: SHIPPED | OUTPATIENT
Start: 2024-08-27

## 2024-08-27 RX ORDER — SPIRONOLACTONE 25 MG/1
25 TABLET ORAL DAILY
Qty: 90 TABLET | Refills: 1 | Status: SHIPPED | OUTPATIENT
Start: 2024-08-27

## 2024-08-27 RX ORDER — ROSUVASTATIN CALCIUM 40 MG/1
40 TABLET, COATED ORAL EVERY EVENING
Qty: 90 TABLET | Refills: 1 | Status: SHIPPED | OUTPATIENT
Start: 2024-08-27

## 2024-08-27 RX ORDER — ASPIRIN 81 MG/1
81 TABLET ORAL
Qty: 100 TABLET | Refills: 1 | Status: SHIPPED | OUTPATIENT
Start: 2024-08-27

## 2024-08-27 RX ORDER — EMPAGLIFLOZIN 10 MG/1
10 TABLET, FILM COATED ORAL DAILY
Qty: 90 TABLET | Refills: 1 | Status: SHIPPED | OUTPATIENT
Start: 2024-08-27

## 2024-08-27 ASSESSMENT — FIBROSIS 4 INDEX
FIB4 SCORE: 0.86
FIB4 SCORE: 0.86

## 2024-08-27 NOTE — PROGRESS NOTES
Chief Complaint   Patient presents with    Cardiomyopathy (Ischemic)    Dyslipidemia    Peripheral Edema    services were used in the patient's primary language of Cantonese.     Name or Number: Matt  Mode of interpretation: iPad    Content of Interpretation:  Per below    Subjective     Pac Keith is a 66 y.o. male who presents today with his daughter for heart failure follow-up patient with EF of 20% on echocardiogram.  Patient was admitted on 4/2/2020 for new onset HF.  Patient has past medical history of T2DM, hypertension, dyslipidemia, ALONSO.  Patient's etiology of HF was determined to be ischemic.  Patient is s/p PCI to midLAD and RCA.  Patient last seen by Dr. Koo on 10/14/2022.  Patient also followed by pharmacotherapy clinic where he was last seen this morning.    Today, patient reports he stopped all his medications approximately 3 weeks ago.  He started developing lower extremity edema which has slowly improved patient also been noticing tachycardic episodes with increased stress in his occupation.  Patient continues to smoke, we discussed the importance of tobacco cessation in office today.  Otherwise, Patient denies chest pain, shortness of breath,  dizziness/lightheadedness, orthopnea, PND.     Patient appears euvolemic on exam with trace bilateral lower extremity edema.    Patient met with pharmacotherapy clinic prior to appointment today who resumed all medications.  Potential financial toxicity with Jardiance discussed with clinical pharmacist.  Will reevaluate renal electrolyte function in 2 weeks for high risk medication use.  LDL 53.  At goal.  We will discontinue Plavix has been more than 12 months since stent placement. We will have patient follow-up in 1 month to ensure tolerance of resuming medical therapy as previously prescribed.      4/13/2022: Initial 6 minute walk test, patient was able to complete 301 m during initial 6 minute walk test. Patient's O2 saturation at  baseline was 98% and at the end of the test, the O2 saturation was 96%. Patient reported level 1 of dyspnea on Corinne scale.  Patient was able to walk for 6 minutes.  MLWHF score 29      Past Medical History:   Diagnosis Date    Diabetes (HCC)     DM type 2 (diabetes mellitus, type 2) (HCC) 2016    Essential hypertension 2016    GERD (gastroesophageal reflux disease) 2016    Hepatitis B 2016    Hyperlipidemia 2016    Vitamin D deficiency 2016     History reviewed. No pertinent surgical history.  Family History   Problem Relation Age of Onset    Heart Disease Neg Hx      Social History     Socioeconomic History    Marital status:      Spouse name: Not on file    Number of children: Not on file    Years of education: Not on file    Highest education level: Not on file   Occupational History    Not on file   Tobacco Use    Smoking status: Every Day     Current packs/day: 0.00     Types: Cigarettes     Last attempt to quit: 12/3/2016     Years since quittin.7    Smokeless tobacco: Never    Tobacco comments:     10 CIGARETTES A DAY   Vaping Use    Vaping status: Never Used   Substance and Sexual Activity    Alcohol use: No     Alcohol/week: 0.0 oz    Drug use: No    Sexual activity: Not on file   Other Topics Concern    Not on file   Social History Narrative    Not on file     Social Determinants of Health     Financial Resource Strain: Not on file   Food Insecurity: Not on file   Transportation Needs: Not on file   Physical Activity: Not on file   Stress: Not on file   Social Connections: Not on file   Intimate Partner Violence: Not on file   Housing Stability: Not on file     No Known Allergies  Outpatient Encounter Medications as of 2024   Medication Sig Dispense Refill    insulin glargine (LANTUS SOLOSTAR) 100 UNIT/ML Solution Pen-injector injection Inject 50 Units under the skin every evening. 45 mL 1    insulin lispro 100 UNIT/ML SC SOPN injection PEN Inject 17 Units  under the skin 3 times a day before meals. And adjust per sliding scale as provided in clinic. 45 mL 1    Insulin Pen Needle (PEN NEEDLES) 33G X 4 MM Misc Use with lantus and humalog, use up to 4 needs daily. 100 Each 99    metFORMIN ER (GLUCOPHAGE XR) 500 MG TABLET SR 24 HR Take 2 Tablets by mouth 2 times a day. 360 Tablet 1    rosuvastatin (CRESTOR) 40 MG tablet Take 1 Tablet by mouth every evening. 90 Tablet 1    ONETOUCH VERIO strip CHECK BLOOD SUGAR ONE-TWO TIME PER DAY AND AS NEEDED FOR HIGH OR LOW SUGAR 100 Strip 5    Lancets Per insurance coverage. Check blood sugar 1x per day and prn ssx of high or low sugar 200 Each 6    Alcohol Swabs ([facility]S PREP) 70 % Pads PER INSURANCE COVERAGE. WIPE SITE WITH PREP PAD PRIOR TO INJECTION 200 Each 5    Empagliflozin (JARDIANCE) 10 MG Tab tablet Take 1 Tablet by mouth every day. 90 Tablet 1    losartan (COZAAR) 50 MG Tab Take 1 Tablet by mouth every day. 90 Tablet 1    metoprolol SR (TOPROL XL) 50 MG TABLET SR 24 HR Take 1 Tablet by mouth every day. 90 Tablet 1    furosemide (LASIX) 20 MG Tab Take 0.5 Tablets by mouth every day. (Patient not taking: Reported on 8/27/2024) 45 Tablet 1    spironolactone (ALDACTONE) 25 MG Tab Take 1 Tablet by mouth every day. 90 Tablet 1    aspirin (ASPIRIN LOW DOSE) 81 MG EC tablet Take 1 Tablet by mouth every day. 100 Tablet 1    Empagliflozin (JARDIANCE) 10 MG Tab tablet Take 1 Tablet by mouth every day. Please bill pt insurance for prescription first, but use 340b pricing if needed. 30 Tablet 6    moxifloxacin (VIGAMOX) 0.5 % Solution Place 1 Drop into left eye 4 times a day. (Patient not taking: Reported on 8/27/2024) 3 mL 2    prednisoLONE acetate (PRED FORTE) 1 % Suspension Place 1 Drop into left eye 4 times a day. (Patient not taking: Reported on 8/27/2024) 10 mL 2    [DISCONTINUED] clopidogrel (PLAVIX) 75 MG Tab TAKE 1 TABLET BY MOUTH EVERY DAY. **LAST SEEN 10/2022 . COURTESY REFILL. TO ENSURE FURTHER REFILLS, CALL [phone] TO  "SCHEDULE FOLLOW UP VISIT.** 30 Tablet 0    [DISCONTINUED] losartan (COZAAR) 25 MG Tab TAKE 1/2 TABLET BY MOUTH DAILY 45 Tablet 0    Blood Glucose Monitoring Suppl (ONE TOUCH ULTRA 2) w/Device Kit CHECK BLOOD SUGAR 1 TIME A DAY AND AS NEEDED 1 Kit 0    vitamin D (CHOLECALCIFEROL) 1000 UNIT Tab Take 2 Tabs by mouth every day. 30 Tab 3     No facility-administered encounter medications on file as of 8/27/2024.     ROS Complete review of systems negative except as noted in HPI/subjective             Objective     /54 (BP Location: Right arm, Patient Position: Sitting, BP Cuff Size: Adult)   Pulse 82   Resp 16   Ht 1.6 m (5' 3\")   Wt 69.4 kg (153 lb)   SpO2 98%   BMI 27.10 kg/m²     Physical Exam  Vitals reviewed.   Constitutional:       Appearance: He is well-developed.   HENT:      Head: Normocephalic and atraumatic.   Eyes:      Pupils: Pupils are equal, round, and reactive to light.   Neck:      Vascular: No JVD.   Cardiovascular:      Rate and Rhythm: Normal rate and regular rhythm.      Heart sounds: Normal heart sounds. No murmur heard.     No friction rub. No gallop.   Pulmonary:      Effort: Pulmonary effort is normal. No respiratory distress.      Breath sounds: Normal breath sounds.   Abdominal:      General: Bowel sounds are normal. There is no distension.      Palpations: Abdomen is soft.   Skin:     General: Skin is warm and dry.      Findings: No erythema.   Neurological:      Mental Status: He is alert and oriented to person, place, and time.   Psychiatric:         Behavior: Behavior normal.            Lab Results   Component Value Date/Time    CHOLSTRLTOT 118 04/18/2024 08:20 AM    LDL 53 04/18/2024 08:20 AM    HDL 41 04/18/2024 08:20 AM    TRIGLYCERIDE 119 04/18/2024 08:20 AM       Lab Results   Component Value Date/Time    SODIUM 133 (L) 07/16/2024 07:24 AM    POTASSIUM 4.2 07/16/2024 07:24 AM    CHLORIDE 97 07/16/2024 07:24 AM    CO2 24 07/16/2024 07:24 AM    GLUCOSE 228 (H) 07/16/2024 " 07:24 AM    BUN 16 07/16/2024 07:24 AM    CREATININE 1.22 07/16/2024 07:24 AM    CREATININE 0.9 12/29/2005 04:08 PM     Lab Results   Component Value Date/Time    ALKPHOSPHAT 62 04/18/2024 08:20 AM    ASTSGOT 11 (L) 04/18/2024 08:20 AM    ALTSGPT 13 04/18/2024 08:20 AM    TBILIRUBIN 0.2 04/18/2024 08:20 AM      Echocardiogram: 4/3/2022  Severely reduced left ventricular systolic function.  Global hypokinesis, relatively preserved wall motion laterally.  Grade III diastolic dysfunction.  Dilated IVC without inspiratory collapse.  Reduced right ventricular systolic function.  Moderate tricuspid regurgitation.  Estimated right ventricular systolic pressure is 62  mmHg.  Incidental note of right atrial mass - most consistent with a prominent   eustachian valve (normal anatomic variation); correlate clinically.    [facility] (4/4/2022): Findings:  1.  Left main coronary artery:  Normal.  2.  Left anterior descending artery: Mid LAD has 80% calcific stenosis after first diagonal takeoff.  First diagonal has luminal irregularities.  Second diagonal is smaller branch, has 70% stenosis in proximal portion  3.  Left circumflex coronary artery: Proximal, midportion, large first marginal free of significant disease.  Distal AV groove branch has moderate stenosis.  4.  Right coronary artery: 60-70% stenosis in proximal portion.  Diffuse 60% stenosis in midportion.  Right posterolateral branch is diffusely diseased with a focal 70% stenosis in the midportion .  This is a right dominant system.  5.  Left ventricular end diastolic pressure:  19 mmHg.  No signficant gradient across the aortic valve.    [facility] (4/7/2022):   Findings   Hemodynamics:   Aorta: 86/42 mmHg  LVEDP: 10 mmHg  No significant pullback gradient across the aortic valve  Results of intervention to the LAD:  Pre: 80% stenosis and JANETTE III flow  Post: 0% residual stenosis and JANETTE III flow. No dissection or distal embolization.  Results of intervention to the diagonal  2:  Pre: 90% stenosis and JANETTE III flow  Post: 80% residual stenosis and JANETTE III flow. No dissection or distal embolization.  Results of intervention to the RCA:  Pre: 90% stenosis and JANETTE III flow  Post: 0% residual stenosis and JANETTE III flow. No dissection or distal embolization.  IMPRESSIONS:  1. Successful staged PCI of the RCA, LAD using MARTA, IVUS guidance  2. Successful preservation of the D2 with POBA  3. Normal LVEDP  Recommendations:  Usual post cath care  Dual antiplatelet therapy for 12 months    TTE (10/11/2022):  Normal left ventricular systolic function.  The left ventricular ejection fraction is visually estimated to be 55-  60%.  Normal diastolic function.  Normal right ventricular size and systolic function.  Estimated pulmonary artery systolic pressure is 30 mmHg.  Normal left atrial size.  No significant valvular abnormalities.  Normal inferior vena cava size and inspiratory collapse.  Assessment & Plan     1. Dyslipidemia        2. Essential hypertension        3. High risk medication use        4. Encounter for tobacco use cessation counseling        5. Essential hypertension, benign        6. ACC/AHA stage C systolic heart failure (HCC)        7. Ischemic cardiomyopathy        8. Coronary artery disease involving native heart without angina pectoris, unspecified vessel or lesion type        9. Type 2 diabetes mellitus without complication, with long-term current use of insulin (HCC)              Medical Decision Making: Today's Assessment/Status/Plan:        HFimpEF, Stage C, Class II, LVEF 55% (Previously 20%):   -Heart failure due to ischemic cardiomyopathy  -ACE-I/ARB/ARNI: Continue losartan 12.5 mg daily  -Evidence Based Beta-blocker: Continue metoprolol XL 25 mg daily  -Aldosterone Antagonist: Continue spironolactone 25 mg daily  -SGLT2-I: Patient reports Jardiance is expensive not currently taking.  Pharmacist checking price WITH 340b  -Diuretic: Continue Lasix 20 mg daily  -Labs: BMP  in 2 weeks  -EF >35% no ICD for primary prevention indicated at this time.  -Reinforced s/sx of worsening heart failure with patient and weight monitoring. Pt verbalizes understanding. Pt to call office if present.    -Heart Failure Education: pt to be contacted by HF nurse for further education,   -Advanced care planning: Advanced directive packet previously provided. Patient to discussed with his daughter and wife.  -Scale provided at HF new appointment for daily weight monitoring.    CAD s/p PCI to mid LAD and RCA (4/2022); dyslipidemia  -Continue aspirin 81 mg lifelong.  Greater than 12 months of DAPT therapy, stop Plavix  -Atorvastatin 40 mg every evening.  LDL 53.  At goal    Hypertension  -Today in office blood pressure is well controlled.   -Encouraged to obtain and continue home BP monitoring/log.  -Medication recommendations per above.    FU in clinic in 1 months. Sooner if needed.    Patient verbalizes understanding and agrees with the plan of care.       AMANDA Benson.   [facility]  [phone]    PLEASE NOTE: This Note was created using voice recognition Software. I have made every reasonable attempt to correct obvious errors, but I expect that there are errors of grammar and possibly content that I did not discover before finalizing the note

## 2024-08-27 NOTE — PROGRESS NOTES
Patient Consult Note     Utilized translation services for this encounter.  Language Line - [phone]  [facility] ID - [id number]   [place]  ID # -    Primary care physician: Pcp Pt States None    Reason for consult: Management of Uncontrolled Type 2 Diabetes    HPI:  Pac Keiht is a 67 y.o. old patient who comes in today for evaluation of above stated problem.     04/03/22 11:18 10/11/22 17:00   Left Ventrical Ejection Fraction 20 55       Allergies:  Patient has no known allergies.    Most Recent labs, A1c, and immunizations:    Lab Results   Component Value Date/Time    HBA1C 9.3 (H) 07/16/2024 07:24 AM       Latest Reference Range & Units 01/22/24 10:09 04/18/24 08:20   Glycohemoglobin 4.0 - 5.6 % 11.1 (H) 9.7 (H)   (H): Data is abnormally high    Lab Results   Component Value Date/Time    CHOLSTRLTOT 118 04/18/2024 08:20 AM    LDL 53 04/18/2024 08:20 AM    HDL 41 04/18/2024 08:20 AM    TRIGLYCERIDE 119 04/18/2024 08:20 AM       Lab Results   Component Value Date/Time    SODIUM 133 (L) 07/16/2024 07:24 AM    POTASSIUM 4.2 07/16/2024 07:24 AM    CHLORIDE 97 07/16/2024 07:24 AM    CO2 24 07/16/2024 07:24 AM    GLUCOSE 228 (H) 07/16/2024 07:24 AM    BUN 16 07/16/2024 07:24 AM    CREATININE 1.22 07/16/2024 07:24 AM    CREATININE 0.9 12/29/2005 04:08 PM     Lab Results   Component Value Date/Time    ALKPHOSPHAT 62 04/18/2024 08:20 AM    ASTSGOT 11 (L) 04/18/2024 08:20 AM    ALTSGPT 13 04/18/2024 08:20 AM    TBILIRUBIN 0.2 04/18/2024 08:20 AM    INR 1.11 07/17/2017 09:31 AM    ALBUMIN 4.2 04/18/2024 08:20 AM      Lab Results   Component Value Date/Time    MALBCRT 26 04/18/2024 08:20 AM    MICROALBUR 4.5 04/18/2024 08:20 AM     Immunization History   Administered Date(s) Administered    Hepatitis B Vaccine (Adol/Adult) 03/13/2020    Influenza (IM) Preservative Free - HISTORICAL DATA 11/19/2015    Influenza Seasonal Injectable - Historical Data 10/08/2013, 04/09/2015    Influenza Vaccine Adult HD 08/21/2020,  "12/19/2022    Influenza Vaccine Quad Inj (Pf) 10/12/2018, 10/04/2019    Influenza Vaccine Quad Inj (Preserved) 10/27/2016    PFIZER PURPLE CAP SARS-COV-2 VACCINATION (12+) 05/05/2021, 05/26/2021    Pneumococcal polysaccharide vaccine (PPSV-23) 08/21/2020, 04/14/2022    Tdap Vaccine 08/21/2020    Zoster Vaccine Recombinant (RZV) (SHINGRIX) 08/21/2020, 01/26/2021       Physical Examination:  Vital signs: /67   Pulse 93   Ht 1.6 m (5' 3\")   Wt 67.6 kg (149 lb)   BMI 26.39 kg/m²  Body mass index is 26.39 kg/m².    Change in weight: Stable  Exercise habits: no regular exercise program   Diet: common adult    Medications:    Current Outpatient Medications:     Lantus SoloStar, 50 Units, Subcutaneous, Q EVENING, Taking    metFORMIN ER, 1,000 mg, Oral, BID, Taking    rosuvastatin, 40 mg, Oral, Q EVENING, Taking    insulin lispro, 17 Units, Subcutaneous, TID AC    Pen Needles, Use with lantus and humalog, use up to 4 needs daily.    Jardiance, 10 mg, Oral, DAILY    moxifloxacin, 1 Drop, 4X/DAY    prednisoLONE acetate, 1 Drop, 4X/DAY    clopidogrel, 75 mg, Oral, DAILY    losartan, 50 mg, Oral, DAILY    metoprolol SR, 50 mg, Oral, DAILY    spironolactone, 25 mg, Oral, DAILY    furosemide, 10 mg, Oral, QDAY    Aspirin Low Dose, TAKE 1 TABLET BY MOUTH EVERY DAY    ONE TOUCH ULTRA 2, CHECK BLOOD SUGAR 1 TIME A DAY AND AS NEEDED    OneTouch Verio, CHECK BLOOD SUGAR ONE-TWO TIME PER DAY AND AS NEEDED FOR HIGH OR LOW SUGAR    Lancets, Per insurance coverage. Check blood sugar 1x per day and prn ssx of high or low sugar    CVS Prep, PER INSURANCE COVERAGE. WIPE SITE WITH PREP PAD PRIOR TO INJECTION    vitamin D, 2,000 Units, Oral, DAILY        Assessment and Plan:  1. DM2   Translation line utilized for visit.   He has been off most of his meds for BP for about 1 week.  I will refill all of his meds   I will defer his Plavix refill to provider, they will see them after me today 8/27/24.   Most recent A1c is: above goal. "   BS before meals - 120  Kidney function:   Latest Reference Range & Units 01/22/24 10:09 04/18/24 08:17 04/18/24 08:20 07/16/24 07:24   Creatinine 0.50 - 1.40 mg/dL 0.89 1.02 0.98 1.22   GFR (CKD-EPI) >60 mL/min/1.73 m 2 92 79 83 64     Medication(s) recommended:   Continue metformin 1000 mg twice daily   Continue Lantus to 50 units at night -per pcp  Continue Humalog 15 -17units before lunch/dinner.  Start Jardiance 10mg daily, if able due to cost,    PAP was denied.    Sent to [employer] for 340B price.     Actos -  contraindicated in CHF    Long-acting insulin:   Adjust dose according to FASTING BLOOD GLUCOSE target 100-130 mg/dL  (1) Increase the insulin dose every 3-4 days as needed.   (2) Increase by 1 units if FBG average concentration is 131-170 mg/dL.   (3) Increase by 2 units if FBG average concentration is 171-210 mg/dL.   (4) Increase by 4 units if FBG average concentration is 221-260 mg/dL.   (5) Increase by 6 units if FBG average concentration is greater than 261mg/dL and call us.   Consider cutting back by 1-2 units to previous dose if glucose concentration is below 100 mg/dL or symptoms of hypoglycemia.      (1) Rapid-acting insulin  Glucose reading  Change to be made to insulin dose    < 80 mg/dL  Subtract 1 unit    100-130 mg/dL  15 units.   (fixed or based on carbohydrate intake)    130-170 mg/dL  Add 1 unit    171-215 mg/dL  Add 2 units    216-260 mg/dL  Add 3 units       -Blood glucose and A1c target        2.  Cardiovascular  Is LDL <100: y  Is Blood pressure <130/80:  n  Medication(s) recommended.   Restart losartan 50mg daily   Restart metoprolol 50mg daily   Restart Spironolactone 25mg   Restart lasix 20mg 1/2 tab bid     3.  Lifestyle changes   Focus on eating a DASH/Mediterranean-style diet.   Exercise 30 minutes daily at least 5 days/week, as tolerated.  https://www.niddk.nih.gov/bwp      Follow-up appointment in 2 week(s)    Levy Scherer, PharmD, MS, BCACP, LCC  [facility]  Phone [phone] fax [phone]    This note was created using voice recognition software (Dragon). The accuracy of the dictation is limited by the abilities of the software. I have reviewed the note prior to signing, however some errors in grammar and context are still possible. If you have any questions related to this note please do not hesitate to contact our office.     CC:   Pcp Pt States None  No ref. provider found  Dr. Gary Mcfarlane

## 2024-09-10 ENCOUNTER — NON-PROVIDER VISIT (OUTPATIENT)
Dept: CARDIOLOGY | Facility: MEDICAL CENTER | Age: 69
End: 2024-09-10
Attending: NURSE PRACTITIONER
Payer: MEDICARE

## 2024-09-10 VITALS
BODY MASS INDEX: 27.11 KG/M2 | SYSTOLIC BLOOD PRESSURE: 110 MMHG | DIASTOLIC BLOOD PRESSURE: 56 MMHG | HEART RATE: 80 BPM | HEIGHT: 63 IN | WEIGHT: 153 LBS

## 2024-09-10 DIAGNOSIS — E11.9 TYPE 2 DIABETES MELLITUS WITHOUT COMPLICATION, WITH LONG-TERM CURRENT USE OF INSULIN (HCC): ICD-10-CM

## 2024-09-10 DIAGNOSIS — E11.42 DIABETIC POLYNEUROPATHY ASSOCIATED WITH TYPE 2 DIABETES MELLITUS (HCC): ICD-10-CM

## 2024-09-10 DIAGNOSIS — Z79.4 TYPE 2 DIABETES MELLITUS WITHOUT COMPLICATION, WITH LONG-TERM CURRENT USE OF INSULIN (HCC): ICD-10-CM

## 2024-09-10 DIAGNOSIS — I50.21 ACUTE SYSTOLIC HEART FAILURE (HCC): ICD-10-CM

## 2024-09-10 PROCEDURE — 99212 OFFICE O/P EST SF 10 MIN: CPT

## 2024-09-10 RX ORDER — PEN NEEDLE, DIABETIC 33 GX5/32"
NEEDLE, DISPOSABLE MISCELLANEOUS
Qty: 100 EACH | Refills: 99 | Status: SHIPPED | OUTPATIENT
Start: 2024-09-10

## 2024-09-10 RX ORDER — METFORMIN HCL 500 MG
1000 TABLET, EXTENDED RELEASE 24 HR ORAL 2 TIMES DAILY
Qty: 400 TABLET | Refills: 1 | Status: SHIPPED | OUTPATIENT
Start: 2024-09-10

## 2024-09-10 RX ORDER — ROSUVASTATIN CALCIUM 40 MG/1
40 TABLET, COATED ORAL EVERY EVENING
Qty: 100 TABLET | Refills: 1 | Status: SHIPPED | OUTPATIENT
Start: 2024-09-10

## 2024-09-10 RX ORDER — EMPAGLIFLOZIN 10 MG/1
10 TABLET, FILM COATED ORAL DAILY
Qty: 30 TABLET | Refills: 0 | Status: SHIPPED | OUTPATIENT
Start: 2024-09-10

## 2024-09-10 RX ORDER — SPIRONOLACTONE 25 MG/1
25 TABLET ORAL DAILY
Qty: 100 TABLET | Refills: 1 | Status: SHIPPED | OUTPATIENT
Start: 2024-09-10

## 2024-09-10 RX ORDER — EMPAGLIFLOZIN 10 MG/1
10 TABLET, FILM COATED ORAL DAILY
Qty: 90 TABLET | Refills: 1 | Status: SHIPPED | OUTPATIENT
Start: 2024-09-10

## 2024-09-10 RX ORDER — METOPROLOL SUCCINATE 50 MG/1
50 TABLET, EXTENDED RELEASE ORAL DAILY
Qty: 100 TABLET | Refills: 1 | Status: SHIPPED | OUTPATIENT
Start: 2024-09-10

## 2024-09-10 RX ORDER — LOSARTAN POTASSIUM 50 MG/1
50 TABLET ORAL DAILY
Qty: 100 TABLET | Refills: 1 | Status: SHIPPED | OUTPATIENT
Start: 2024-09-10

## 2024-09-10 RX ORDER — INSULIN GLARGINE 100 [IU]/ML
50 INJECTION, SOLUTION SUBCUTANEOUS EVERY EVENING
Qty: 45 ML | Refills: 1 | Status: SHIPPED | OUTPATIENT
Start: 2024-09-10

## 2024-09-10 RX ORDER — INSULIN LISPRO 100 [IU]/ML
17 INJECTION, SOLUTION INTRAVENOUS; SUBCUTANEOUS
Qty: 45 ML | Refills: 1 | Status: SHIPPED | OUTPATIENT
Start: 2024-09-10

## 2024-09-10 RX ORDER — ASPIRIN 81 MG/1
81 TABLET ORAL
Qty: 100 TABLET | Refills: 1 | Status: SHIPPED | OUTPATIENT
Start: 2024-09-10

## 2024-09-10 ASSESSMENT — FIBROSIS 4 INDEX: FIB4 SCORE: 0.86

## 2024-09-10 NOTE — PROGRESS NOTES
Patient Consult Note     Utilized translation services for this encounter.  Language Line - [phone]  [facility] ID - [id number]    Primary care physician: Pcp Pt States None    Reason for consult: Management of Uncontrolled Type 2 Diabetes    HPI:  Pac Keith is a 67 y.o. old patient who comes in today for evaluation of above stated problem.     04/03/22 11:18 10/11/22 17:00   Left Ventrical Ejection Fraction 20 55       Allergies:  Patient has no known allergies.    Most Recent labs, A1c, and immunizations:    Lab Results   Component Value Date/Time    HBA1C 9.3 (H) 07/16/2024 07:24 AM       Latest Reference Range & Units 01/22/24 10:09 04/18/24 08:20   Glycohemoglobin 4.0 - 5.6 % 11.1 (H) 9.7 (H)       Lab Results   Component Value Date/Time    CHOLSTRLTOT 118 04/18/2024 08:20 AM    LDL 53 04/18/2024 08:20 AM    HDL 41 04/18/2024 08:20 AM    TRIGLYCERIDE 119 04/18/2024 08:20 AM       Lab Results   Component Value Date/Time    SODIUM 133 (L) 07/16/2024 07:24 AM    POTASSIUM 4.2 07/16/2024 07:24 AM    CHLORIDE 97 07/16/2024 07:24 AM    CO2 24 07/16/2024 07:24 AM    GLUCOSE 228 (H) 07/16/2024 07:24 AM    BUN 16 07/16/2024 07:24 AM    CREATININE 1.22 07/16/2024 07:24 AM    CREATININE 0.9 12/29/2005 04:08 PM     Lab Results   Component Value Date/Time    ALKPHOSPHAT 62 04/18/2024 08:20 AM    ASTSGOT 11 (L) 04/18/2024 08:20 AM    ALTSGPT 13 04/18/2024 08:20 AM    TBILIRUBIN 0.2 04/18/2024 08:20 AM    INR 1.11 07/17/2017 09:31 AM    ALBUMIN 4.2 04/18/2024 08:20 AM      Lab Results   Component Value Date/Time    MALBCRT 26 04/18/2024 08:20 AM    MICROALBUR 4.5 04/18/2024 08:20 AM     Immunization History   Administered Date(s) Administered    Hepatitis B Vaccine (Adol/Adult) 03/13/2020    Influenza (IM) Preservative Free - HISTORICAL DATA 11/19/2015    Influenza Seasonal Injectable - Historical Data 10/08/2013, 04/09/2015    Influenza Vaccine Adult HD 08/21/2020, 12/19/2022    Influenza Vaccine Quad Inj (Pf) 10/12/2018,  "10/04/2019    Influenza Vaccine Quad Inj (Preserved) 10/27/2016    PFIZER PURPLE CAP SARS-COV-2 VACCINATION (12+) 05/05/2021, 05/26/2021    Pneumococcal polysaccharide vaccine (PPSV-23) 08/21/2020, 04/14/2022    Tdap Vaccine 08/21/2020    Zoster Vaccine Recombinant (RZV) (SHINGRIX) 08/21/2020, 01/26/2021       Physical Examination:  Vital signs: /56   Pulse 80   Ht 1.6 m (5' 3\")   Wt 69.4 kg (153 lb)   BMI 27.10 kg/m²  Body mass index is 27.1 kg/m².    Change in weight: Stable  Exercise habits: no regular exercise program   Diet: common adult    Medications:    Current Outpatient Medications:     aspirin, 81 mg, Oral, QDAY    Jardiance, 10 mg, Oral, DAILY    Lantus SoloStar, 50 Units, Subcutaneous, Q EVENING    insulin lispro, 17 Units, Subcutaneous, TID AC    Pen Needles, Use with lantus and humalog, use up to 4 needs daily.    losartan, 50 mg, Oral, DAILY    metFORMIN ER, 1,000 mg, Oral, BID    metoprolol SR, 50 mg, Oral, DAILY    rosuvastatin, 40 mg, Oral, Q EVENING    spironolactone, 25 mg, Oral, DAILY    furosemide, 10 mg, Oral, QDAY (Patient not taking: Reported on 8/27/2024)    moxifloxacin, 1 Drop, 4X/DAY (Patient not taking: Reported on 8/27/2024)    prednisoLONE acetate, 1 Drop, 4X/DAY (Patient not taking: Reported on 8/27/2024)    ONE TOUCH ULTRA 2, CHECK BLOOD SUGAR 1 TIME A DAY AND AS NEEDED    OneTouch Verio, CHECK BLOOD SUGAR ONE-TWO TIME PER DAY AND AS NEEDED FOR HIGH OR LOW SUGAR    Lancets, Per insurance coverage. Check blood sugar 1x per day and prn ssx of high or low sugar    [facility] Prep, PER INSURANCE COVERAGE. WIPE SITE WITH PREP PAD PRIOR TO INJECTION    vitamin D, 2,000 Units, Oral, DAILY    Assessment and Plan:  1. DM2   Translation line utilized for visit.   Most recent A1c is: above goal.   BS before meals - 120  Kidney function:   Latest Reference Range & Units 01/22/24 10:09 04/18/24 08:17 04/18/24 08:20 07/16/24 07:24   Creatinine 0.50 - 1.40 mg/dL 0.89 1.02 0.98 1.22   GFR " (CKD-EPI) >60 mL/min/1.73 m 2 92 79 83 64     Medication(s) recommended:   Continue metformin 1000 mg twice daily   Continue Lantus to 50 units at night -per pcp  Continue Humalog 15 -17units before lunch/dinner.   Jardiance 10mg daily, if able due to cost,    Sample also sent to pharmacy     Actos -  contraindicated in CHF    Long-acting insulin:   Adjust dose according to FASTING BLOOD GLUCOSE target 100-130 mg/dL  (1) Increase the insulin dose every 3-4 days as needed.   (2) Increase by 1 units if FBG average concentration is 131-170 mg/dL.   (3) Increase by 2 units if FBG average concentration is 171-210 mg/dL.   (4) Increase by 4 units if FBG average concentration is 221-260 mg/dL.   (5) Increase by 6 units if FBG average concentration is greater than 261mg/dL and call us.   Consider cutting back by 1-2 units to previous dose if glucose concentration is below 100 mg/dL or symptoms of hypoglycemia.      (1) Rapid-acting insulin  Glucose reading  Change to be made to insulin dose    < 80 mg/dL  Subtract 1 unit    100-130 mg/dL  15 units.   (fixed or based on carbohydrate intake)    130-170 mg/dL  Add 1 unit    171-215 mg/dL  Add 2 units    216-260 mg/dL  Add 3 units       -Blood glucose and A1c target        2.  Cardiovascular  Is LDL <100: y  Is Blood pressure <130/80:  n  Medication(s) recommended.   Restart losartan 50mg daily   Restart metoprolol 50mg daily   Restart Spironolactone 25mg   Restart lasix 20mg 1/2 tab bid     3.  Lifestyle changes   Focus on eating a DASH/Mediterranean-style diet.   Exercise 30 minutes daily at least 5 days/week, as tolerated.  https://www.niddk.nih.gov/bwp      Follow-up appointment in 2 week(s)    Levy Scherer, PharmD, MS, BCACP, LCC  [facility]  Phone [phone] fax [phone]    This note was created using voice recognition software (Dragon). The accuracy of the dictation is limited by the abilities of the software. I have reviewed  the note prior to signing, however some errors in grammar and context are still possible. If you have any questions related to this note please do not hesitate to contact our office.     CC:   Pcp Pt States None  No ref. provider found  Dr. Gary Mcfarlane

## 2024-09-24 ENCOUNTER — DOCUMENTATION (OUTPATIENT)
Dept: CARDIOLOGY | Facility: MEDICAL CENTER | Age: 69
End: 2024-09-24
Payer: MEDICARE

## 2024-09-24 ENCOUNTER — APPOINTMENT (OUTPATIENT)
Dept: CARDIOLOGY | Facility: MEDICAL CENTER | Age: 69
End: 2024-09-24
Attending: NURSE PRACTITIONER
Payer: MEDICARE

## 2024-09-25 ENCOUNTER — TELEPHONE (OUTPATIENT)
Dept: CARDIOLOGY | Facility: MEDICAL CENTER | Age: 69
End: 2024-09-25
Payer: MEDICARE

## 2024-10-02 DIAGNOSIS — I50.21 ACUTE SYSTOLIC HEART FAILURE (HCC): ICD-10-CM

## 2024-10-03 RX ORDER — ASPIRIN 81 MG/1
81 TABLET, COATED ORAL
Qty: 100 TABLET | Refills: 1 | Status: SHIPPED | OUTPATIENT
Start: 2024-10-03

## 2024-10-07 NOTE — PROCEDURES
CARDIAC CATHETERIZATION REPORT    Referring Provider: Minh Rodriguez M.D.    PROCEDURE PHYSICIAN: Raul Vigil MD, [facility], [facility]  ASSISTANT: None    IMPRESSIONS:  1. Successful staged PCI of the RCA, LAD using MARTA, IVUS guidance  2. Successful preservation of the D2 with POBA  3. Normal LVEDP    Recommendations:  Usual post cath care  Dual antiplatelet therapy for 12 months    Pre-procedure diagnosis: Ischemic cardiomyopathy  Post-procedure diagnosis: Same    Procedure performed  Left heart catheterization  Percutaneous coronary intervention - Stent Placement (LAD, RCA)  PCI- Angioplasty (Diagonal)  Intravascular Ultrasound (LAD, RCA)    Conscious sedation was supervised by myself and administered by trained personnel using fentanyl and versed between 1549 and 1700. The patient tolerated sedation without complication.     Procedure Description  1. Access: 5/6 [place] right radial artery Micropuncture technique was utilized following local anesthesia with lidocaine.  A radial cocktail containing verapamil and saline was administered in the radial artery sheath    2.  Procedure description: After confirming therapeutic anticoagulation EBU 3.5 and Prowater wire were used to intervene on the LAD.  I also put a BMW wire in the diagonal.  The diagonal was dilated with a 2.0 x 20 mm compliant balloon.  I then dilated the LAD with a 3.0 x 15 mm NC balloon and then remove the wire from the diagonal performed IVUS of the LAD which demonstrated a reference vessel diameter of 3.5 mm proximally.  Due to tortuosity the IVUS would not cross into the distal LAD.  I then deployed a 3.0 x 38 Chris drug-eluting stent at 16 forrest.  I postdilated the stent length with a 3.0 x 15 NC balloon at 20 forrest and more proximal a 3.5 x 12 mm NC balloon at 18 forrest.  Subsequent angiograms showed excellent result.  I then remove the left coronary equipment and engaged the right coronary with a JR4 guide and passed Prowater wire into the distal  vessel.  I performed IVUS which demonstrated a reference vessel diameter of 4 mm as well as diffuse atherosclerosis throughout the proximal one half of the AV groove.  I dilated with a 4.0 x 27 mm NC balloon and then deployed 4.0 x 38 [place] drug-eluting stents x2 in overlapping fashion extending to the ostium of the vessel.  I then postdilated the ostium with the stent balloon at 18 forrest and dilated the stent length with a 4.0 x 27 NC balloon at 20 forrest.  Subsequent angiogram showed excellent result.    3. Closing: At completion of the procedure the relevant equipment was removed from the body and hemostasis achieved by Radial band    Findings   Hemodynamics:   Aorta: 86/42 mmHg  LVEDP: 10 mmHg  No significant pullback gradient across the aortic valve      Results of intervention to the LAD:  Pre: 80% stenosis and JANETTE III flow  Post: 0% residual stenosis and JANETTE III flow. No dissection or distal embolization.    Results of intervention to the diagonal 2:  Pre: 90% stenosis and JANETTE III flow  Post: 80% residual stenosis and JANETTE III flow. No dissection or distal embolization.    Results of intervention to the RCA:  Pre: 90% stenosis and JANETTE III flow  Post: 0% residual stenosis and JANETTE III flow. No dissection or distal embolization.    Technical Factors  1. Complications: None  2. Estimated Blood Loss: <50 cc  3. Specimens: None  4. Contrast Volume: 110 ml  5. Medications: Radial cocktail (Verapamil 2.5 mg, Nitroglycerin 100 mcg) Heparin to maintain ACT >250 Clopidogrel 600 mg Intracoronary nitroglycerin  6. Radiation (Air Kerma): 580 mGy     Specialty Care (immediate)...